# Patient Record
Sex: MALE | Race: WHITE | NOT HISPANIC OR LATINO | Employment: OTHER | URBAN - METROPOLITAN AREA
[De-identification: names, ages, dates, MRNs, and addresses within clinical notes are randomized per-mention and may not be internally consistent; named-entity substitution may affect disease eponyms.]

---

## 2017-02-19 ENCOUNTER — APPOINTMENT (EMERGENCY)
Dept: RADIOLOGY | Facility: HOSPITAL | Age: 82
DRG: 291 | End: 2017-02-19
Payer: MEDICARE

## 2017-02-19 ENCOUNTER — HOSPITAL ENCOUNTER (INPATIENT)
Facility: HOSPITAL | Age: 82
LOS: 2 days | Discharge: HOME | DRG: 291 | End: 2017-02-21
Attending: EMERGENCY MEDICINE | Admitting: FAMILY MEDICINE
Payer: MEDICARE

## 2017-02-19 DIAGNOSIS — I24.8 DEMAND ISCHEMIA (HCC): ICD-10-CM

## 2017-02-19 DIAGNOSIS — I50.23 ACUTE ON CHRONIC SYSTOLIC CONGESTIVE HEART FAILURE (HCC): Primary | ICD-10-CM

## 2017-02-19 DIAGNOSIS — Z79.01 CHRONIC ANTICOAGULATION: ICD-10-CM

## 2017-02-19 DIAGNOSIS — R06.01 ORTHOPNEA: ICD-10-CM

## 2017-02-19 DIAGNOSIS — R06.00 DYSPNEA ON EXERTION: ICD-10-CM

## 2017-02-19 DIAGNOSIS — N18.9 CKD (CHRONIC KIDNEY DISEASE), UNSPECIFIED STAGE: ICD-10-CM

## 2017-02-19 DIAGNOSIS — I50.1 PULMONARY EDEMA WITH CONGESTIVE HEART FAILURE (HCC): ICD-10-CM

## 2017-02-19 PROBLEM — I50.9 ACUTE EXACERBATION OF CHF (CONGESTIVE HEART FAILURE) (HCC): Status: ACTIVE | Noted: 2017-02-19

## 2017-02-19 PROBLEM — I10 HTN (HYPERTENSION): Chronic | Status: ACTIVE | Noted: 2017-02-19

## 2017-02-19 PROBLEM — I48.91 A-FIB (HCC): Chronic | Status: ACTIVE | Noted: 2017-02-19

## 2017-02-19 PROBLEM — Z98.62 H/O CAROTID ANGIOPLASTY: Chronic | Status: ACTIVE | Noted: 2017-02-19

## 2017-02-19 PROBLEM — N17.9 AKI (ACUTE KIDNEY INJURY) (HCC): Status: ACTIVE | Noted: 2017-02-19

## 2017-02-19 PROBLEM — R77.8 ELEVATED TROPONIN: Chronic | Status: ACTIVE | Noted: 2017-02-19

## 2017-02-19 LAB
ANION GAP SERPL CALCULATED.3IONS-SCNC: 10 MMOL/L (ref 4–13)
BASOPHILS # BLD AUTO: 0.03 THOUSANDS/ΜL (ref 0–0.1)
BASOPHILS NFR BLD AUTO: 1 % (ref 0–1)
BUN SERPL-MCNC: 27 MG/DL (ref 5–25)
CALCIUM SERPL-MCNC: 8.7 MG/DL (ref 8.3–10.1)
CHLORIDE SERPL-SCNC: 98 MMOL/L (ref 100–108)
CO2 SERPL-SCNC: 25 MMOL/L (ref 21–32)
CREAT SERPL-MCNC: 2.35 MG/DL (ref 0.6–1.3)
EOSINOPHIL # BLD AUTO: 0.13 THOUSAND/ΜL (ref 0–0.61)
EOSINOPHIL NFR BLD AUTO: 3 % (ref 0–6)
ERYTHROCYTE [DISTWIDTH] IN BLOOD BY AUTOMATED COUNT: 15.9 % (ref 11.6–15.1)
GFR SERPL CREATININE-BSD FRML MDRD: 26.4 ML/MIN/1.73SQ M
GLUCOSE SERPL-MCNC: 133 MG/DL (ref 65–140)
HCT VFR BLD AUTO: 25.5 % (ref 36.5–49.3)
HGB BLD-MCNC: 8.1 G/DL (ref 12–17)
HIV 1+2 AB+HIV1 P24 AG SERPL QL IA: NORMAL
HIV1 P24 AG SER QL: NORMAL
INR PPP: 2.58 (ref 0.86–1.16)
LYMPHOCYTES # BLD AUTO: 1.05 THOUSANDS/ΜL (ref 0.6–4.47)
LYMPHOCYTES NFR BLD AUTO: 21 % (ref 14–44)
MCH RBC QN AUTO: 25.7 PG (ref 26.8–34.3)
MCHC RBC AUTO-ENTMCNC: 31.8 G/DL (ref 31.4–37.4)
MCV RBC AUTO: 81 FL (ref 82–98)
MONOCYTES # BLD AUTO: 0.61 THOUSAND/ΜL (ref 0.17–1.22)
MONOCYTES NFR BLD AUTO: 12 % (ref 4–12)
NEUTROPHILS # BLD AUTO: 3.1 THOUSANDS/ΜL (ref 1.85–7.62)
NEUTS SEG NFR BLD AUTO: 63 % (ref 43–75)
NRBC BLD AUTO-RTO: 0 /100 WBCS
NT-PROBNP SERPL-MCNC: ABNORMAL PG/ML
NT-PROBNP SERPL-MCNC: ABNORMAL PG/ML
PLATELET # BLD AUTO: 151 THOUSANDS/UL (ref 149–390)
PMV BLD AUTO: 11.3 FL (ref 8.9–12.7)
POTASSIUM SERPL-SCNC: 4.4 MMOL/L (ref 3.5–5.3)
PROTHROMBIN TIME: 26.9 SECONDS (ref 12–14.3)
RBC # BLD AUTO: 3.15 MILLION/UL (ref 3.88–5.62)
SODIUM SERPL-SCNC: 133 MMOL/L (ref 136–145)
TROPONIN I SERPL-MCNC: 0.26 NG/ML
TROPONIN I SERPL-MCNC: 0.29 NG/ML
TROPONIN I SERPL-MCNC: 0.3 NG/ML
WBC # BLD AUTO: 4.93 THOUSAND/UL (ref 4.31–10.16)

## 2017-02-19 PROCEDURE — 85610 PROTHROMBIN TIME: CPT | Performed by: EMERGENCY MEDICINE

## 2017-02-19 PROCEDURE — 93005 ELECTROCARDIOGRAM TRACING: CPT | Performed by: EMERGENCY MEDICINE

## 2017-02-19 PROCEDURE — 36415 COLL VENOUS BLD VENIPUNCTURE: CPT | Performed by: EMERGENCY MEDICINE

## 2017-02-19 PROCEDURE — 83880 ASSAY OF NATRIURETIC PEPTIDE: CPT | Performed by: EMERGENCY MEDICINE

## 2017-02-19 PROCEDURE — 87340 HEPATITIS B SURFACE AG IA: CPT | Performed by: PHYSICIAN ASSISTANT

## 2017-02-19 PROCEDURE — 71020 HB CHEST X-RAY 2VW FRONTAL&LATL: CPT

## 2017-02-19 PROCEDURE — 99285 EMERGENCY DEPT VISIT HI MDM: CPT

## 2017-02-19 PROCEDURE — 84484 ASSAY OF TROPONIN QUANT: CPT | Performed by: FAMILY MEDICINE

## 2017-02-19 PROCEDURE — 83880 ASSAY OF NATRIURETIC PEPTIDE: CPT | Performed by: FAMILY MEDICINE

## 2017-02-19 PROCEDURE — 85025 COMPLETE CBC W/AUTO DIFF WBC: CPT | Performed by: EMERGENCY MEDICINE

## 2017-02-19 PROCEDURE — 84484 ASSAY OF TROPONIN QUANT: CPT | Performed by: EMERGENCY MEDICINE

## 2017-02-19 PROCEDURE — 87806 HIV AG W/HIV1&2 ANTB W/OPTIC: CPT | Performed by: PHYSICIAN ASSISTANT

## 2017-02-19 PROCEDURE — 80048 BASIC METABOLIC PNL TOTAL CA: CPT | Performed by: EMERGENCY MEDICINE

## 2017-02-19 PROCEDURE — 86803 HEPATITIS C AB TEST: CPT | Performed by: PHYSICIAN ASSISTANT

## 2017-02-19 RX ORDER — HYDRALAZINE HYDROCHLORIDE 10 MG/1
10 TABLET, FILM COATED ORAL 3 TIMES DAILY
Status: DISCONTINUED | OUTPATIENT
Start: 2017-02-19 | End: 2017-02-21 | Stop reason: HOSPADM

## 2017-02-19 RX ORDER — CHOLECALCIFEROL (VITAMIN D3) 125 MCG
5 CAPSULE ORAL
Status: DISCONTINUED | OUTPATIENT
Start: 2017-02-19 | End: 2017-02-19

## 2017-02-19 RX ORDER — LORAZEPAM 0.5 MG/1
0.5 TABLET ORAL EVERY 6 HOURS PRN
Status: ON HOLD | COMMUNITY
End: 2017-02-19

## 2017-02-19 RX ORDER — CARVEDILOL 6.25 MG/1
6.25 TABLET ORAL 2 TIMES DAILY WITH MEALS
Status: DISCONTINUED | OUTPATIENT
Start: 2017-02-19 | End: 2017-02-21 | Stop reason: HOSPADM

## 2017-02-19 RX ORDER — ALBUTEROL SULFATE 90 UG/1
2 AEROSOL, METERED RESPIRATORY (INHALATION) EVERY 6 HOURS PRN
Status: ON HOLD | COMMUNITY
End: 2018-08-31 | Stop reason: ALTCHOICE

## 2017-02-19 RX ORDER — FUROSEMIDE 10 MG/ML
40 INJECTION INTRAMUSCULAR; INTRAVENOUS DAILY
Status: DISCONTINUED | OUTPATIENT
Start: 2017-02-20 | End: 2017-02-20

## 2017-02-19 RX ORDER — ISOSORBIDE DINITRATE 20 MG/1
20 TABLET ORAL 2 TIMES DAILY
COMMUNITY
End: 2018-02-28 | Stop reason: ALTCHOICE

## 2017-02-19 RX ORDER — CLOPIDOGREL BISULFATE 75 MG/1
75 TABLET ORAL DAILY
Status: ON HOLD | COMMUNITY
End: 2017-02-19

## 2017-02-19 RX ORDER — WARFARIN SODIUM 5 MG/1
5 TABLET ORAL
COMMUNITY
End: 2017-03-22 | Stop reason: DRUGHIGH

## 2017-02-19 RX ORDER — FUROSEMIDE 20 MG/1
20 TABLET ORAL DAILY
Status: ON HOLD | COMMUNITY
End: 2017-02-19

## 2017-02-19 RX ORDER — ALBUTEROL SULFATE 90 UG/1
2 AEROSOL, METERED RESPIRATORY (INHALATION) EVERY 6 HOURS PRN
Status: DISCONTINUED | OUTPATIENT
Start: 2017-02-19 | End: 2017-02-21 | Stop reason: HOSPADM

## 2017-02-19 RX ORDER — WARFARIN SODIUM 5 MG/1
5 TABLET ORAL
Status: DISCONTINUED | OUTPATIENT
Start: 2017-02-19 | End: 2017-02-21 | Stop reason: HOSPADM

## 2017-02-19 RX ORDER — CARVEDILOL 6.25 MG/1
6.25 TABLET ORAL 2 TIMES DAILY WITH MEALS
COMMUNITY
End: 2019-02-21 | Stop reason: HOSPADM

## 2017-02-19 RX ORDER — FUROSEMIDE 10 MG/ML
40 INJECTION INTRAMUSCULAR; INTRAVENOUS ONCE
Status: COMPLETED | OUTPATIENT
Start: 2017-02-19 | End: 2017-02-19

## 2017-02-19 RX ORDER — ASPIRIN 81 MG/1
324 TABLET, CHEWABLE ORAL ONCE
Status: COMPLETED | OUTPATIENT
Start: 2017-02-19 | End: 2017-02-19

## 2017-02-19 RX ORDER — ISOSORBIDE DINITRATE 10 MG/1
20 TABLET ORAL 2 TIMES DAILY
Status: DISCONTINUED | OUTPATIENT
Start: 2017-02-19 | End: 2017-02-21 | Stop reason: HOSPADM

## 2017-02-19 RX ORDER — CHOLECALCIFEROL (VITAMIN D3) 125 MCG
CAPSULE ORAL
COMMUNITY
End: 2017-07-13

## 2017-02-19 RX ORDER — LANOLIN ALCOHOL/MO/W.PET/CERES
3 CREAM (GRAM) TOPICAL
Status: DISCONTINUED | OUTPATIENT
Start: 2017-02-19 | End: 2017-02-21 | Stop reason: HOSPADM

## 2017-02-19 RX ORDER — HYDRALAZINE HYDROCHLORIDE 10 MG/1
10 TABLET, FILM COATED ORAL 3 TIMES DAILY
COMMUNITY
End: 2017-07-17 | Stop reason: HOSPADM

## 2017-02-19 RX ORDER — CALCIUM CARBONATE 200(500)MG
500 TABLET,CHEWABLE ORAL 3 TIMES DAILY PRN
Status: DISCONTINUED | OUTPATIENT
Start: 2017-02-19 | End: 2017-02-21 | Stop reason: HOSPADM

## 2017-02-19 RX ADMIN — HYDRALAZINE HYDROCHLORIDE 10 MG: 10 TABLET, FILM COATED ORAL at 21:43

## 2017-02-19 RX ADMIN — FUROSEMIDE 40 MG: 10 INJECTION, SOLUTION INTRAMUSCULAR; INTRAVENOUS at 11:06

## 2017-02-19 RX ADMIN — MELATONIN TAB 3 MG 3 MG: 3 TAB at 21:30

## 2017-02-19 RX ADMIN — ISOSORBIDE DINITRATE 20 MG: 10 TABLET ORAL at 18:19

## 2017-02-19 RX ADMIN — ASPIRIN 81 MG 324 MG: 81 TABLET ORAL at 11:05

## 2017-02-19 RX ADMIN — CARVEDILOL 6.25 MG: 6.25 TABLET, FILM COATED ORAL at 18:19

## 2017-02-19 RX ADMIN — WARFARIN SODIUM 5 MG: 5 TABLET ORAL at 18:18

## 2017-02-20 ENCOUNTER — APPOINTMENT (INPATIENT)
Dept: NON INVASIVE DIAGNOSTICS | Facility: HOSPITAL | Age: 82
DRG: 291 | End: 2017-02-20
Payer: MEDICARE

## 2017-02-20 LAB
ANION GAP SERPL CALCULATED.3IONS-SCNC: 10 MMOL/L (ref 4–13)
ATRIAL RATE: 72 BPM
BUN SERPL-MCNC: 29 MG/DL (ref 5–25)
CALCIUM SERPL-MCNC: 8.3 MG/DL (ref 8.3–10.1)
CHLORIDE SERPL-SCNC: 98 MMOL/L (ref 100–108)
CO2 SERPL-SCNC: 25 MMOL/L (ref 21–32)
CREAT SERPL-MCNC: 2.49 MG/DL (ref 0.6–1.3)
ERYTHROCYTE [DISTWIDTH] IN BLOOD BY AUTOMATED COUNT: 15.9 % (ref 11.6–15.1)
GFR SERPL CREATININE-BSD FRML MDRD: 24.7 ML/MIN/1.73SQ M
GLUCOSE SERPL-MCNC: 96 MG/DL (ref 65–140)
HCT VFR BLD AUTO: 30.1 % (ref 36.5–49.3)
HGB BLD-MCNC: 9.4 G/DL (ref 12–17)
MAGNESIUM SERPL-MCNC: 1.6 MG/DL (ref 1.6–2.6)
MCH RBC QN AUTO: 25.1 PG (ref 26.8–34.3)
MCHC RBC AUTO-ENTMCNC: 31.2 G/DL (ref 31.4–37.4)
MCV RBC AUTO: 80 FL (ref 82–98)
P AXIS: 73 DEGREES
PLATELET # BLD AUTO: 167 THOUSANDS/UL (ref 149–390)
PMV BLD AUTO: 11.9 FL (ref 8.9–12.7)
POTASSIUM SERPL-SCNC: 4 MMOL/L (ref 3.5–5.3)
PR INTERVAL: 236 MS
QRS AXIS: 96 DEGREES
QRSD INTERVAL: 144 MS
QT INTERVAL: 462 MS
QTC INTERVAL: 505 MS
RBC # BLD AUTO: 3.75 MILLION/UL (ref 3.88–5.62)
SODIUM SERPL-SCNC: 133 MMOL/L (ref 136–145)
T WAVE AXIS: 9 DEGREES
TROPONIN I SERPL-MCNC: 0.27 NG/ML
TSH SERPL DL<=0.05 MIU/L-ACNC: 1.41 UIU/ML (ref 0.36–3.74)
VENTRICULAR RATE: 72 BPM
WBC # BLD AUTO: 6.65 THOUSAND/UL (ref 4.31–10.16)

## 2017-02-20 PROCEDURE — 97110 THERAPEUTIC EXERCISES: CPT

## 2017-02-20 PROCEDURE — 93306 TTE W/DOPPLER COMPLETE: CPT

## 2017-02-20 PROCEDURE — 80048 BASIC METABOLIC PNL TOTAL CA: CPT | Performed by: FAMILY MEDICINE

## 2017-02-20 PROCEDURE — 84484 ASSAY OF TROPONIN QUANT: CPT | Performed by: FAMILY MEDICINE

## 2017-02-20 PROCEDURE — 97162 PT EVAL MOD COMPLEX 30 MIN: CPT

## 2017-02-20 PROCEDURE — G8978 MOBILITY CURRENT STATUS: HCPCS

## 2017-02-20 PROCEDURE — 83735 ASSAY OF MAGNESIUM: CPT | Performed by: FAMILY MEDICINE

## 2017-02-20 PROCEDURE — G8979 MOBILITY GOAL STATUS: HCPCS

## 2017-02-20 PROCEDURE — 84443 ASSAY THYROID STIM HORMONE: CPT | Performed by: FAMILY MEDICINE

## 2017-02-20 PROCEDURE — 85027 COMPLETE CBC AUTOMATED: CPT | Performed by: FAMILY MEDICINE

## 2017-02-20 RX ORDER — FUROSEMIDE 10 MG/ML
40 INJECTION INTRAMUSCULAR; INTRAVENOUS
Status: DISCONTINUED | OUTPATIENT
Start: 2017-02-20 | End: 2017-02-21

## 2017-02-20 RX ORDER — ASPIRIN 325 MG
325 TABLET ORAL DAILY
Status: DISCONTINUED | OUTPATIENT
Start: 2017-02-20 | End: 2017-02-21 | Stop reason: HOSPADM

## 2017-02-20 RX ORDER — ALPRAZOLAM 0.5 MG/1
0.5 TABLET ORAL
Status: DISCONTINUED | OUTPATIENT
Start: 2017-02-20 | End: 2017-02-21 | Stop reason: HOSPADM

## 2017-02-20 RX ADMIN — ALPRAZOLAM 0.5 MG: 0.5 TABLET ORAL at 21:26

## 2017-02-20 RX ADMIN — CARVEDILOL 6.25 MG: 6.25 TABLET, FILM COATED ORAL at 15:42

## 2017-02-20 RX ADMIN — FUROSEMIDE 40 MG: 10 INJECTION, SOLUTION INTRAMUSCULAR; INTRAVENOUS at 15:42

## 2017-02-20 RX ADMIN — HYDRALAZINE HYDROCHLORIDE 10 MG: 10 TABLET, FILM COATED ORAL at 21:26

## 2017-02-20 RX ADMIN — WARFARIN SODIUM 5 MG: 5 TABLET ORAL at 19:20

## 2017-02-20 RX ADMIN — HYDRALAZINE HYDROCHLORIDE 10 MG: 10 TABLET, FILM COATED ORAL at 09:58

## 2017-02-20 RX ADMIN — MELATONIN TAB 3 MG 3 MG: 3 TAB at 21:26

## 2017-02-20 RX ADMIN — CARVEDILOL 6.25 MG: 6.25 TABLET, FILM COATED ORAL at 08:59

## 2017-02-20 RX ADMIN — ISOSORBIDE DINITRATE 20 MG: 10 TABLET ORAL at 08:59

## 2017-02-20 RX ADMIN — HYDRALAZINE HYDROCHLORIDE 10 MG: 10 TABLET, FILM COATED ORAL at 15:42

## 2017-02-20 RX ADMIN — FUROSEMIDE 40 MG: 10 INJECTION, SOLUTION INTRAMUSCULAR; INTRAVENOUS at 08:59

## 2017-02-20 RX ADMIN — ISOSORBIDE DINITRATE 10 MG: 10 TABLET ORAL at 19:19

## 2017-02-21 VITALS
BODY MASS INDEX: 27.23 KG/M2 | OXYGEN SATURATION: 94 % | HEIGHT: 68 IN | SYSTOLIC BLOOD PRESSURE: 139 MMHG | DIASTOLIC BLOOD PRESSURE: 64 MMHG | WEIGHT: 179.68 LBS | RESPIRATION RATE: 18 BRPM | HEART RATE: 67 BPM | TEMPERATURE: 98.1 F

## 2017-02-21 PROBLEM — I50.9 ACUTE EXACERBATION OF CHF (CONGESTIVE HEART FAILURE) (HCC): Status: RESOLVED | Noted: 2017-02-19 | Resolved: 2017-02-21

## 2017-02-21 PROBLEM — Z86.79 HISTORY OF CHF (CONGESTIVE HEART FAILURE): Status: ACTIVE | Noted: 2017-02-21

## 2017-02-21 LAB
HBV SURFACE AG SER QL: NORMAL
HCV AB SER QL: ABNORMAL

## 2017-02-21 RX ORDER — FUROSEMIDE 40 MG/1
40 TABLET ORAL DAILY
Status: DISCONTINUED | OUTPATIENT
Start: 2017-02-22 | End: 2017-02-21 | Stop reason: HOSPADM

## 2017-02-21 RX ORDER — ALPRAZOLAM 0.5 MG/1
0.25 TABLET ORAL
Qty: 30 TABLET | Refills: 0 | Status: SHIPPED | OUTPATIENT
Start: 2017-02-21 | End: 2018-07-11

## 2017-02-21 RX ORDER — FUROSEMIDE 40 MG/1
40 TABLET ORAL DAILY
Qty: 30 TABLET | Refills: 0 | Status: SHIPPED | OUTPATIENT
Start: 2017-02-22 | End: 2017-07-17 | Stop reason: HOSPADM

## 2017-02-21 RX ADMIN — ISOSORBIDE DINITRATE 20 MG: 10 TABLET ORAL at 08:31

## 2017-02-21 RX ADMIN — HYDRALAZINE HYDROCHLORIDE 10 MG: 10 TABLET, FILM COATED ORAL at 08:32

## 2017-02-21 RX ADMIN — FUROSEMIDE 40 MG: 10 INJECTION, SOLUTION INTRAMUSCULAR; INTRAVENOUS at 08:32

## 2017-02-21 RX ADMIN — ASPIRIN 325 MG: 325 TABLET ORAL at 08:31

## 2017-02-21 RX ADMIN — CARVEDILOL 6.25 MG: 6.25 TABLET, FILM COATED ORAL at 08:31

## 2017-03-13 ENCOUNTER — GENERIC CONVERSION - ENCOUNTER (OUTPATIENT)
Dept: OTHER | Facility: OTHER | Age: 82
End: 2017-03-13

## 2017-03-22 ENCOUNTER — APPOINTMENT (EMERGENCY)
Dept: RADIOLOGY | Facility: HOSPITAL | Age: 82
DRG: 378 | End: 2017-03-22
Payer: MEDICARE

## 2017-03-22 ENCOUNTER — APPOINTMENT (EMERGENCY)
Dept: CT IMAGING | Facility: HOSPITAL | Age: 82
DRG: 378 | End: 2017-03-22
Payer: MEDICARE

## 2017-03-22 ENCOUNTER — HOSPITAL ENCOUNTER (INPATIENT)
Facility: HOSPITAL | Age: 82
LOS: 4 days | Discharge: HOME HEALTH | DRG: 378 | End: 2017-03-26
Attending: EMERGENCY MEDICINE | Admitting: INTERNAL MEDICINE
Payer: MEDICARE

## 2017-03-22 ENCOUNTER — APPOINTMENT (INPATIENT)
Dept: ULTRASOUND IMAGING | Facility: HOSPITAL | Age: 82
DRG: 378 | End: 2017-03-22
Payer: MEDICARE

## 2017-03-22 DIAGNOSIS — K92.2 GASTROINTESTINAL BLEEDING: Primary | ICD-10-CM

## 2017-03-22 DIAGNOSIS — N18.4 CKD (CHRONIC KIDNEY DISEASE) STAGE 4, GFR 15-29 ML/MIN (HCC): ICD-10-CM

## 2017-03-22 DIAGNOSIS — D68.9 COAGULOPATHY (HCC): ICD-10-CM

## 2017-03-22 DIAGNOSIS — K92.1 MELENA: ICD-10-CM

## 2017-03-22 DIAGNOSIS — N17.9 AKI (ACUTE KIDNEY INJURY) (HCC): ICD-10-CM

## 2017-03-22 DIAGNOSIS — D64.9 ANEMIA, UNSPECIFIED TYPE: ICD-10-CM

## 2017-03-22 PROBLEM — R42 DIZZINESS: Status: ACTIVE | Noted: 2017-03-22

## 2017-03-22 PROBLEM — I48.91 ATRIAL FIBRILLATION (HCC): Status: ACTIVE | Noted: 2017-03-22

## 2017-03-22 PROBLEM — R06.02 SOB (SHORTNESS OF BREATH): Status: ACTIVE | Noted: 2017-03-22

## 2017-03-22 PROBLEM — I50.9 CHF (CONGESTIVE HEART FAILURE) (HCC): Status: ACTIVE | Noted: 2017-03-22

## 2017-03-22 LAB
ABO GROUP BLD: NORMAL
ALBUMIN SERPL BCP-MCNC: 3.5 G/DL (ref 3.5–5)
ALP SERPL-CCNC: 82 U/L (ref 46–116)
ALT SERPL W P-5'-P-CCNC: 28 U/L (ref 12–78)
ANION GAP SERPL CALCULATED.3IONS-SCNC: 10 MMOL/L (ref 4–13)
AST SERPL W P-5'-P-CCNC: 26 U/L (ref 5–45)
ATRIAL RATE: 73 BPM
BASOPHILS # BLD AUTO: 0.03 THOUSANDS/ΜL (ref 0–0.1)
BASOPHILS NFR BLD AUTO: 1 % (ref 0–1)
BILIRUB DIRECT SERPL-MCNC: 0.24 MG/DL (ref 0–0.2)
BILIRUB SERPL-MCNC: 0.6 MG/DL (ref 0.2–1)
BLD GP AB SCN SERPL QL: NEGATIVE
BUN SERPL-MCNC: 45 MG/DL (ref 5–25)
CALCIUM SERPL-MCNC: 9 MG/DL (ref 8.3–10.1)
CHLORIDE SERPL-SCNC: 108 MMOL/L (ref 100–108)
CO2 SERPL-SCNC: 27 MMOL/L (ref 21–32)
CREAT SERPL-MCNC: 2.52 MG/DL (ref 0.6–1.3)
EOSINOPHIL # BLD AUTO: 0.3 THOUSAND/ΜL (ref 0–0.61)
EOSINOPHIL NFR BLD AUTO: 5 % (ref 0–6)
ERYTHROCYTE [DISTWIDTH] IN BLOOD BY AUTOMATED COUNT: 18 % (ref 11.6–15.1)
FERRITIN SERPL-MCNC: 31 NG/ML (ref 8–388)
GFR SERPL CREATININE-BSD FRML MDRD: 24.4 ML/MIN/1.73SQ M
GLUCOSE SERPL-MCNC: 126 MG/DL (ref 65–140)
HCT VFR BLD AUTO: 28.6 % (ref 36.5–49.3)
HCT VFR BLD AUTO: 29.3 % (ref 36.5–49.3)
HGB BLD-MCNC: 8.7 G/DL (ref 12–17)
HGB BLD-MCNC: 9 G/DL (ref 12–17)
INR PPP: 2.52 (ref 0.86–1.16)
IRON SATN MFR SERPL: 20 %
IRON SERPL-MCNC: 74 UG/DL (ref 65–175)
LYMPHOCYTES # BLD AUTO: 1.21 THOUSANDS/ΜL (ref 0.6–4.47)
LYMPHOCYTES NFR BLD AUTO: 22 % (ref 14–44)
MCH RBC QN AUTO: 24.6 PG (ref 26.8–34.3)
MCHC RBC AUTO-ENTMCNC: 30.4 G/DL (ref 31.4–37.4)
MCV RBC AUTO: 81 FL (ref 82–98)
MONOCYTES # BLD AUTO: 0.59 THOUSAND/ΜL (ref 0.17–1.22)
MONOCYTES NFR BLD AUTO: 11 % (ref 4–12)
NEUTROPHILS # BLD AUTO: 3.42 THOUSANDS/ΜL (ref 1.85–7.62)
NEUTS SEG NFR BLD AUTO: 61 % (ref 43–75)
NRBC BLD AUTO-RTO: 0 /100 WBCS
P AXIS: 54 DEGREES
PLATELET # BLD AUTO: 110 THOUSANDS/UL (ref 149–390)
PMV BLD AUTO: 11.7 FL (ref 8.9–12.7)
POTASSIUM SERPL-SCNC: 4 MMOL/L (ref 3.5–5.3)
PR INTERVAL: 224 MS
PROT SERPL-MCNC: 8.2 G/DL (ref 6.4–8.2)
PROTHROMBIN TIME: 26.4 SECONDS (ref 12–14.3)
QRS AXIS: 100 DEGREES
QRSD INTERVAL: 148 MS
QT INTERVAL: 454 MS
QTC INTERVAL: 500 MS
RBC # BLD AUTO: 3.54 MILLION/UL (ref 3.88–5.62)
RH BLD: NEGATIVE
SODIUM SERPL-SCNC: 145 MMOL/L (ref 136–145)
T WAVE AXIS: -9 DEGREES
TIBC SERPL-MCNC: 373 UG/DL (ref 250–450)
TROPONIN I SERPL-MCNC: 0.05 NG/ML
VENTRICULAR RATE: 73 BPM
WBC # BLD AUTO: 5.57 THOUSAND/UL (ref 4.31–10.16)

## 2017-03-22 PROCEDURE — 36415 COLL VENOUS BLD VENIPUNCTURE: CPT | Performed by: EMERGENCY MEDICINE

## 2017-03-22 PROCEDURE — P9017 PLASMA 1 DONOR FRZ W/IN 8 HR: HCPCS

## 2017-03-22 PROCEDURE — 86901 BLOOD TYPING SEROLOGIC RH(D): CPT | Performed by: EMERGENCY MEDICINE

## 2017-03-22 PROCEDURE — 71020 HB CHEST X-RAY 2VW FRONTAL&LATL: CPT

## 2017-03-22 PROCEDURE — P9021 RED BLOOD CELLS UNIT: HCPCS

## 2017-03-22 PROCEDURE — 82728 ASSAY OF FERRITIN: CPT | Performed by: INTERNAL MEDICINE

## 2017-03-22 PROCEDURE — 83540 ASSAY OF IRON: CPT | Performed by: INTERNAL MEDICINE

## 2017-03-22 PROCEDURE — 85610 PROTHROMBIN TIME: CPT | Performed by: EMERGENCY MEDICINE

## 2017-03-22 PROCEDURE — 86850 RBC ANTIBODY SCREEN: CPT | Performed by: EMERGENCY MEDICINE

## 2017-03-22 PROCEDURE — 80076 HEPATIC FUNCTION PANEL: CPT | Performed by: EMERGENCY MEDICINE

## 2017-03-22 PROCEDURE — 85018 HEMOGLOBIN: CPT | Performed by: FAMILY MEDICINE

## 2017-03-22 PROCEDURE — 99285 EMERGENCY DEPT VISIT HI MDM: CPT

## 2017-03-22 PROCEDURE — 86900 BLOOD TYPING SEROLOGIC ABO: CPT | Performed by: EMERGENCY MEDICINE

## 2017-03-22 PROCEDURE — 74176 CT ABD & PELVIS W/O CONTRAST: CPT

## 2017-03-22 PROCEDURE — 86927 PLASMA FRESH FROZEN: CPT

## 2017-03-22 PROCEDURE — 85025 COMPLETE CBC W/AUTO DIFF WBC: CPT | Performed by: EMERGENCY MEDICINE

## 2017-03-22 PROCEDURE — 76770 US EXAM ABDO BACK WALL COMP: CPT

## 2017-03-22 PROCEDURE — 80048 BASIC METABOLIC PNL TOTAL CA: CPT | Performed by: EMERGENCY MEDICINE

## 2017-03-22 PROCEDURE — C9113 INJ PANTOPRAZOLE SODIUM, VIA: HCPCS | Performed by: FAMILY MEDICINE

## 2017-03-22 PROCEDURE — 84484 ASSAY OF TROPONIN QUANT: CPT | Performed by: FAMILY MEDICINE

## 2017-03-22 PROCEDURE — 83550 IRON BINDING TEST: CPT | Performed by: INTERNAL MEDICINE

## 2017-03-22 PROCEDURE — 93005 ELECTROCARDIOGRAM TRACING: CPT | Performed by: EMERGENCY MEDICINE

## 2017-03-22 PROCEDURE — 86920 COMPATIBILITY TEST SPIN: CPT

## 2017-03-22 PROCEDURE — 85014 HEMATOCRIT: CPT | Performed by: FAMILY MEDICINE

## 2017-03-22 PROCEDURE — 30233N1 TRANSFUSION OF NONAUTOLOGOUS RED BLOOD CELLS INTO PERIPHERAL VEIN, PERCUTANEOUS APPROACH: ICD-10-PCS | Performed by: INTERNAL MEDICINE

## 2017-03-22 RX ORDER — ACETAMINOPHEN 325 MG/1
650 TABLET ORAL EVERY 6 HOURS PRN
Status: DISCONTINUED | OUTPATIENT
Start: 2017-03-22 | End: 2017-03-26 | Stop reason: HOSPADM

## 2017-03-22 RX ORDER — FUROSEMIDE 40 MG/1
40 TABLET ORAL DAILY
Status: DISCONTINUED | OUTPATIENT
Start: 2017-03-22 | End: 2017-03-26 | Stop reason: HOSPADM

## 2017-03-22 RX ORDER — ALPRAZOLAM 0.25 MG/1
0.25 TABLET ORAL 3 TIMES DAILY PRN
Status: DISCONTINUED | OUTPATIENT
Start: 2017-03-22 | End: 2017-03-26 | Stop reason: HOSPADM

## 2017-03-22 RX ORDER — WARFARIN SODIUM 1 MG/1
1 TABLET ORAL DAILY
COMMUNITY
End: 2017-03-26 | Stop reason: HOSPADM

## 2017-03-22 RX ORDER — HYDRALAZINE HYDROCHLORIDE 10 MG/1
10 TABLET, FILM COATED ORAL 3 TIMES DAILY
Status: DISCONTINUED | OUTPATIENT
Start: 2017-03-22 | End: 2017-03-25

## 2017-03-22 RX ORDER — CARVEDILOL 6.25 MG/1
6.25 TABLET ORAL 2 TIMES DAILY WITH MEALS
Status: DISCONTINUED | OUTPATIENT
Start: 2017-03-22 | End: 2017-03-23

## 2017-03-22 RX ORDER — ISOSORBIDE DINITRATE 10 MG/1
20 TABLET ORAL 2 TIMES DAILY
Status: DISCONTINUED | OUTPATIENT
Start: 2017-03-22 | End: 2017-03-26 | Stop reason: HOSPADM

## 2017-03-22 RX ORDER — ONDANSETRON 2 MG/ML
4 INJECTION INTRAMUSCULAR; INTRAVENOUS EVERY 6 HOURS PRN
Status: DISCONTINUED | OUTPATIENT
Start: 2017-03-22 | End: 2017-03-26 | Stop reason: HOSPADM

## 2017-03-22 RX ORDER — ALBUTEROL SULFATE 90 UG/1
2 AEROSOL, METERED RESPIRATORY (INHALATION) EVERY 6 HOURS PRN
Status: DISCONTINUED | OUTPATIENT
Start: 2017-03-22 | End: 2017-03-26 | Stop reason: HOSPADM

## 2017-03-22 RX ORDER — PHYTONADIONE 5 MG/1
2.5 TABLET ORAL ONCE
Status: COMPLETED | OUTPATIENT
Start: 2017-03-22 | End: 2017-03-22

## 2017-03-22 RX ADMIN — HYDRALAZINE HYDROCHLORIDE 10 MG: 10 TABLET, FILM COATED ORAL at 15:23

## 2017-03-22 RX ADMIN — HYDRALAZINE HYDROCHLORIDE 10 MG: 10 TABLET, FILM COATED ORAL at 23:40

## 2017-03-22 RX ADMIN — SODIUM CHLORIDE 80 MG: 9 INJECTION, SOLUTION INTRAVENOUS at 14:34

## 2017-03-22 RX ADMIN — ISOSORBIDE DINITRATE 20 MG: 10 TABLET ORAL at 17:52

## 2017-03-22 RX ADMIN — PHYTONADIONE 2.5 MG: 5 TABLET ORAL at 14:10

## 2017-03-22 RX ADMIN — FUROSEMIDE 40 MG: 40 TABLET ORAL at 17:52

## 2017-03-22 RX ADMIN — ACETAMINOPHEN 650 MG: 325 TABLET ORAL at 16:09

## 2017-03-22 RX ADMIN — CARVEDILOL 6.25 MG: 6.25 TABLET, FILM COATED ORAL at 17:53

## 2017-03-22 RX ADMIN — ALPRAZOLAM 0.25 MG: 0.25 TABLET ORAL at 16:10

## 2017-03-22 RX ADMIN — SODIUM CHLORIDE 8 MG/HR: 9 INJECTION, SOLUTION INTRAVENOUS at 18:22

## 2017-03-23 ENCOUNTER — ANESTHESIA (INPATIENT)
Dept: PERIOP | Facility: HOSPITAL | Age: 82
DRG: 378 | End: 2017-03-23
Payer: MEDICARE

## 2017-03-23 ENCOUNTER — ANESTHESIA EVENT (INPATIENT)
Dept: PERIOP | Facility: HOSPITAL | Age: 82
DRG: 378 | End: 2017-03-23
Payer: MEDICARE

## 2017-03-23 PROBLEM — I12.9 HYPERTENSIVE CKD (CHRONIC KIDNEY DISEASE): Status: ACTIVE | Noted: 2017-03-23

## 2017-03-23 PROBLEM — D64.9 ANEMIA: Status: ACTIVE | Noted: 2017-03-23

## 2017-03-23 LAB
ABO GROUP BLD BPU: NORMAL
ABO GROUP BLD BPU: NORMAL
AMORPH PHOS CRY URNS QL MICRO: ABNORMAL /HPF
ANION GAP SERPL CALCULATED.3IONS-SCNC: 10 MMOL/L (ref 4–13)
BACTERIA UR QL AUTO: ABNORMAL /HPF
BILIRUB UR QL STRIP: NEGATIVE
BPU ID: NORMAL
BPU ID: NORMAL
BUN SERPL-MCNC: 40 MG/DL (ref 5–25)
CALCIUM SERPL-MCNC: 9 MG/DL (ref 8.3–10.1)
CHLORIDE SERPL-SCNC: 106 MMOL/L (ref 100–108)
CLARITY UR: ABNORMAL
CO2 SERPL-SCNC: 27 MMOL/L (ref 21–32)
COLOR UR: YELLOW
CREAT SERPL-MCNC: 2.51 MG/DL (ref 0.6–1.3)
CREAT UR-MCNC: 65.4 MG/DL
CROSSMATCH: NORMAL
FOLATE SERPL-MCNC: 15.2 NG/ML (ref 3.1–17.5)
GFR SERPL CREATININE-BSD FRML MDRD: 24.5 ML/MIN/1.73SQ M
GLUCOSE SERPL-MCNC: 97 MG/DL (ref 65–140)
GLUCOSE UR STRIP-MCNC: NEGATIVE MG/DL
HCT VFR BLD AUTO: 26.7 % (ref 36.5–49.3)
HCT VFR BLD AUTO: 28.5 % (ref 36.5–49.3)
HCT VFR BLD AUTO: 29.7 % (ref 36.5–49.3)
HGB BLD-MCNC: 8.1 G/DL (ref 12–17)
HGB BLD-MCNC: 8.8 G/DL (ref 12–17)
HGB BLD-MCNC: 9.1 G/DL (ref 12–17)
HGB UR QL STRIP.AUTO: ABNORMAL
INR PPP: 1.87 (ref 0.86–1.16)
INR PPP: 2.15 (ref 0.86–1.16)
KETONES UR STRIP-MCNC: NEGATIVE MG/DL
LEUKOCYTE ESTERASE UR QL STRIP: ABNORMAL
MICROALBUMIN UR-MCNC: 313 MG/L (ref 0–20)
MICROALBUMIN/CREAT 24H UR: 479 MG/G CREATININE (ref 0–30)
NITRITE UR QL STRIP: POSITIVE
NON-SQ EPI CELLS URNS QL MICRO: ABNORMAL /HPF
PH UR STRIP.AUTO: 8 [PH] (ref 4.5–8)
POTASSIUM SERPL-SCNC: 4.1 MMOL/L (ref 3.5–5.3)
PROT UR STRIP-MCNC: ABNORMAL MG/DL
PROTHROMBIN TIME: 21 SECONDS (ref 12–14.3)
PROTHROMBIN TIME: 23.4 SECONDS (ref 12–14.3)
RBC #/AREA URNS AUTO: ABNORMAL /HPF
SODIUM 24H UR-SCNC: 82 MOL/L
SODIUM SERPL-SCNC: 143 MMOL/L (ref 136–145)
SP GR UR STRIP.AUTO: 1.01 (ref 1–1.03)
TROPONIN I SERPL-MCNC: 0.05 NG/ML
UNIT DISPENSE STATUS: NORMAL
UNIT DISPENSE STATUS: NORMAL
UNIT PRODUCT CODE: NORMAL
UNIT PRODUCT CODE: NORMAL
UNIT RH: NORMAL
UNIT RH: NORMAL
UROBILINOGEN UR QL STRIP.AUTO: 0.2 E.U./DL
UUN 24H UR-MCNC: 473 MG/DL
VIT B12 SERPL-MCNC: 332 PG/ML (ref 100–900)
WBC #/AREA URNS AUTO: ABNORMAL /HPF

## 2017-03-23 PROCEDURE — 81001 URINALYSIS AUTO W/SCOPE: CPT | Performed by: INTERNAL MEDICINE

## 2017-03-23 PROCEDURE — C9113 INJ PANTOPRAZOLE SODIUM, VIA: HCPCS | Performed by: FAMILY MEDICINE

## 2017-03-23 PROCEDURE — 85610 PROTHROMBIN TIME: CPT | Performed by: FAMILY MEDICINE

## 2017-03-23 PROCEDURE — 82043 UR ALBUMIN QUANTITATIVE: CPT | Performed by: INTERNAL MEDICINE

## 2017-03-23 PROCEDURE — 82746 ASSAY OF FOLIC ACID SERUM: CPT | Performed by: INTERNAL MEDICINE

## 2017-03-23 PROCEDURE — 84484 ASSAY OF TROPONIN QUANT: CPT | Performed by: FAMILY MEDICINE

## 2017-03-23 PROCEDURE — 84540 ASSAY OF URINE/UREA-N: CPT | Performed by: INTERNAL MEDICINE

## 2017-03-23 PROCEDURE — 85610 PROTHROMBIN TIME: CPT | Performed by: PHYSICIAN ASSISTANT

## 2017-03-23 PROCEDURE — 84300 ASSAY OF URINE SODIUM: CPT | Performed by: INTERNAL MEDICINE

## 2017-03-23 PROCEDURE — 85014 HEMATOCRIT: CPT | Performed by: FAMILY MEDICINE

## 2017-03-23 PROCEDURE — 80048 BASIC METABOLIC PNL TOTAL CA: CPT | Performed by: FAMILY MEDICINE

## 2017-03-23 PROCEDURE — 82570 ASSAY OF URINE CREATININE: CPT | Performed by: INTERNAL MEDICINE

## 2017-03-23 PROCEDURE — 85018 HEMOGLOBIN: CPT | Performed by: FAMILY MEDICINE

## 2017-03-23 PROCEDURE — P9017 PLASMA 1 DONOR FRZ W/IN 8 HR: HCPCS

## 2017-03-23 PROCEDURE — 82607 VITAMIN B-12: CPT | Performed by: INTERNAL MEDICINE

## 2017-03-23 PROCEDURE — 86927 PLASMA FRESH FROZEN: CPT

## 2017-03-23 PROCEDURE — 88342 IMHCHEM/IMCYTCHM 1ST ANTB: CPT | Performed by: INTERNAL MEDICINE

## 2017-03-23 PROCEDURE — 88305 TISSUE EXAM BY PATHOLOGIST: CPT | Performed by: INTERNAL MEDICINE

## 2017-03-23 PROCEDURE — 0DB68ZX EXCISION OF STOMACH, VIA NATURAL OR ARTIFICIAL OPENING ENDOSCOPIC, DIAGNOSTIC: ICD-10-PCS | Performed by: INTERNAL MEDICINE

## 2017-03-23 RX ORDER — PROPOFOL 10 MG/ML
INJECTION, EMULSION INTRAVENOUS AS NEEDED
Status: DISCONTINUED | OUTPATIENT
Start: 2017-03-23 | End: 2017-03-23 | Stop reason: SURG

## 2017-03-23 RX ORDER — CARVEDILOL 12.5 MG/1
12.5 TABLET ORAL 2 TIMES DAILY WITH MEALS
Status: DISCONTINUED | OUTPATIENT
Start: 2017-03-23 | End: 2017-03-26 | Stop reason: HOSPADM

## 2017-03-23 RX ORDER — ZOLPIDEM TARTRATE 5 MG/1
5 TABLET ORAL
Status: DISCONTINUED | OUTPATIENT
Start: 2017-03-23 | End: 2017-03-26 | Stop reason: HOSPADM

## 2017-03-23 RX ORDER — SODIUM CHLORIDE, SODIUM LACTATE, POTASSIUM CHLORIDE, CALCIUM CHLORIDE 600; 310; 30; 20 MG/100ML; MG/100ML; MG/100ML; MG/100ML
INJECTION, SOLUTION INTRAVENOUS CONTINUOUS PRN
Status: DISCONTINUED | OUTPATIENT
Start: 2017-03-23 | End: 2017-03-23 | Stop reason: SURG

## 2017-03-23 RX ORDER — LIDOCAINE HYDROCHLORIDE 10 MG/ML
INJECTION, SOLUTION INFILTRATION; PERINEURAL AS NEEDED
Status: DISCONTINUED | OUTPATIENT
Start: 2017-03-23 | End: 2017-03-23 | Stop reason: SURG

## 2017-03-23 RX ORDER — FUROSEMIDE 10 MG/ML
20 INJECTION INTRAMUSCULAR; INTRAVENOUS ONCE
Status: DISCONTINUED | OUTPATIENT
Start: 2017-03-23 | End: 2017-03-26 | Stop reason: HOSPADM

## 2017-03-23 RX ADMIN — SODIUM CHLORIDE 8 MG/HR: 9 INJECTION, SOLUTION INTRAVENOUS at 04:38

## 2017-03-23 RX ADMIN — LIDOCAINE HYDROCHLORIDE 50 MG: 10 INJECTION, SOLUTION INFILTRATION; PERINEURAL at 13:58

## 2017-03-23 RX ADMIN — POLYETHYLENE GLYCOL 3350, SODIUM SULFATE ANHYDROUS, SODIUM BICARBONATE, SODIUM CHLORIDE, POTASSIUM CHLORIDE 4000 ML: 236; 22.74; 6.74; 5.86; 2.97 POWDER, FOR SOLUTION ORAL at 18:29

## 2017-03-23 RX ADMIN — ALPRAZOLAM 0.25 MG: 0.25 TABLET ORAL at 08:52

## 2017-03-23 RX ADMIN — ALPRAZOLAM 0.25 MG: 0.25 TABLET ORAL at 16:11

## 2017-03-23 RX ADMIN — SODIUM CHLORIDE, SODIUM LACTATE, POTASSIUM CHLORIDE, AND CALCIUM CHLORIDE: .6; .31; .03; .02 INJECTION, SOLUTION INTRAVENOUS at 13:53

## 2017-03-23 RX ADMIN — BISACODYL 10 MG: 5 TABLET, COATED ORAL at 16:13

## 2017-03-23 RX ADMIN — ISOSORBIDE DINITRATE 20 MG: 10 TABLET ORAL at 16:11

## 2017-03-23 RX ADMIN — HYDRALAZINE HYDROCHLORIDE 10 MG: 10 TABLET, FILM COATED ORAL at 16:13

## 2017-03-23 RX ADMIN — ISOSORBIDE DINITRATE 20 MG: 10 TABLET ORAL at 08:52

## 2017-03-23 RX ADMIN — PROPOFOL 100 MG: 10 INJECTION, EMULSION INTRAVENOUS at 13:55

## 2017-03-23 RX ADMIN — FUROSEMIDE 40 MG: 40 TABLET ORAL at 08:52

## 2017-03-23 RX ADMIN — CARVEDILOL 12.5 MG: 6.25 TABLET, FILM COATED ORAL at 16:10

## 2017-03-23 RX ADMIN — ALPRAZOLAM 0.25 MG: 0.25 TABLET ORAL at 23:07

## 2017-03-23 RX ADMIN — CARVEDILOL 12.5 MG: 6.25 TABLET, FILM COATED ORAL at 08:52

## 2017-03-23 RX ADMIN — SODIUM CHLORIDE 8 MG/HR: 9 INJECTION, SOLUTION INTRAVENOUS at 18:56

## 2017-03-24 ENCOUNTER — ANESTHESIA EVENT (INPATIENT)
Dept: PERIOP | Facility: HOSPITAL | Age: 82
DRG: 378 | End: 2017-03-24
Payer: MEDICARE

## 2017-03-24 ENCOUNTER — ANESTHESIA (INPATIENT)
Dept: PERIOP | Facility: HOSPITAL | Age: 82
DRG: 378 | End: 2017-03-24
Payer: MEDICARE

## 2017-03-24 LAB
ABO GROUP BLD BPU: NORMAL
ABO GROUP BLD BPU: NORMAL
ANION GAP SERPL CALCULATED.3IONS-SCNC: 11 MMOL/L (ref 4–13)
BPU ID: NORMAL
BPU ID: NORMAL
BUN SERPL-MCNC: 37 MG/DL (ref 5–25)
CALCIUM SERPL-MCNC: 9 MG/DL (ref 8.3–10.1)
CHLORIDE SERPL-SCNC: 106 MMOL/L (ref 100–108)
CO2 SERPL-SCNC: 28 MMOL/L (ref 21–32)
CREAT SERPL-MCNC: 2.72 MG/DL (ref 0.6–1.3)
ERYTHROCYTE [DISTWIDTH] IN BLOOD BY AUTOMATED COUNT: 18.1 % (ref 11.6–15.1)
GFR SERPL CREATININE-BSD FRML MDRD: 22.3 ML/MIN/1.73SQ M
GLUCOSE SERPL-MCNC: 90 MG/DL (ref 65–140)
HCT VFR BLD AUTO: 31.2 % (ref 36.5–49.3)
HGB BLD-MCNC: 9.6 G/DL (ref 12–17)
INR PPP: 1.73 (ref 0.86–1.16)
MCH RBC QN AUTO: 24.6 PG (ref 26.8–34.3)
MCHC RBC AUTO-ENTMCNC: 30.8 G/DL (ref 31.4–37.4)
MCV RBC AUTO: 80 FL (ref 82–98)
PLATELET # BLD AUTO: 95 THOUSANDS/UL (ref 149–390)
PMV BLD AUTO: 12.8 FL (ref 8.9–12.7)
POTASSIUM SERPL-SCNC: 4.2 MMOL/L (ref 3.5–5.3)
PROTHROMBIN TIME: 19.9 SECONDS (ref 12–14.3)
RBC # BLD AUTO: 3.9 MILLION/UL (ref 3.88–5.62)
SODIUM SERPL-SCNC: 145 MMOL/L (ref 136–145)
UNIT DISPENSE STATUS: NORMAL
UNIT DISPENSE STATUS: NORMAL
UNIT PRODUCT CODE: NORMAL
UNIT PRODUCT CODE: NORMAL
UNIT RH: NORMAL
UNIT RH: NORMAL
WBC # BLD AUTO: 5.66 THOUSAND/UL (ref 4.31–10.16)

## 2017-03-24 PROCEDURE — 80048 BASIC METABOLIC PNL TOTAL CA: CPT | Performed by: FAMILY MEDICINE

## 2017-03-24 PROCEDURE — 88305 TISSUE EXAM BY PATHOLOGIST: CPT | Performed by: INTERNAL MEDICINE

## 2017-03-24 PROCEDURE — C9113 INJ PANTOPRAZOLE SODIUM, VIA: HCPCS | Performed by: FAMILY MEDICINE

## 2017-03-24 PROCEDURE — 0D5K8ZZ DESTRUCTION OF ASCENDING COLON, VIA NATURAL OR ARTIFICIAL OPENING ENDOSCOPIC: ICD-10-PCS | Performed by: INTERNAL MEDICINE

## 2017-03-24 PROCEDURE — 85027 COMPLETE CBC AUTOMATED: CPT | Performed by: FAMILY MEDICINE

## 2017-03-24 PROCEDURE — 85610 PROTHROMBIN TIME: CPT | Performed by: FAMILY MEDICINE

## 2017-03-24 PROCEDURE — 0DBN8ZX EXCISION OF SIGMOID COLON, VIA NATURAL OR ARTIFICIAL OPENING ENDOSCOPIC, DIAGNOSTIC: ICD-10-PCS | Performed by: INTERNAL MEDICINE

## 2017-03-24 PROCEDURE — 0DBL8ZX EXCISION OF TRANSVERSE COLON, VIA NATURAL OR ARTIFICIAL OPENING ENDOSCOPIC, DIAGNOSTIC: ICD-10-PCS | Performed by: INTERNAL MEDICINE

## 2017-03-24 RX ORDER — EPHEDRINE SULFATE 50 MG/ML
INJECTION, SOLUTION INTRAVENOUS AS NEEDED
Status: DISCONTINUED | OUTPATIENT
Start: 2017-03-24 | End: 2017-03-24 | Stop reason: SURG

## 2017-03-24 RX ORDER — PROPOFOL 10 MG/ML
INJECTION, EMULSION INTRAVENOUS AS NEEDED
Status: DISCONTINUED | OUTPATIENT
Start: 2017-03-24 | End: 2017-03-24 | Stop reason: SURG

## 2017-03-24 RX ORDER — SODIUM CHLORIDE, SODIUM LACTATE, POTASSIUM CHLORIDE, CALCIUM CHLORIDE 600; 310; 30; 20 MG/100ML; MG/100ML; MG/100ML; MG/100ML
INJECTION, SOLUTION INTRAVENOUS CONTINUOUS PRN
Status: DISCONTINUED | OUTPATIENT
Start: 2017-03-24 | End: 2017-03-24 | Stop reason: SURG

## 2017-03-24 RX ADMIN — PROPOFOL 10 MG: 10 INJECTION, EMULSION INTRAVENOUS at 13:10

## 2017-03-24 RX ADMIN — EPHEDRINE SULFATE 10 MG: 50 INJECTION, SOLUTION INTRAMUSCULAR; INTRAVENOUS; SUBCUTANEOUS at 13:10

## 2017-03-24 RX ADMIN — PROPOFOL 20 MG: 10 INJECTION, EMULSION INTRAVENOUS at 13:00

## 2017-03-24 RX ADMIN — FUROSEMIDE 40 MG: 40 TABLET ORAL at 10:47

## 2017-03-24 RX ADMIN — SODIUM CHLORIDE, SODIUM LACTATE, POTASSIUM CHLORIDE, AND CALCIUM CHLORIDE: .6; .31; .03; .02 INJECTION, SOLUTION INTRAVENOUS at 12:37

## 2017-03-24 RX ADMIN — PROPOFOL 50 MG: 10 INJECTION, EMULSION INTRAVENOUS at 12:56

## 2017-03-24 RX ADMIN — CARVEDILOL 12.5 MG: 6.25 TABLET, FILM COATED ORAL at 10:47

## 2017-03-24 RX ADMIN — ISOSORBIDE DINITRATE 20 MG: 10 TABLET ORAL at 10:47

## 2017-03-24 RX ADMIN — CARVEDILOL 12.5 MG: 6.25 TABLET, FILM COATED ORAL at 16:20

## 2017-03-24 RX ADMIN — HYDRALAZINE HYDROCHLORIDE 10 MG: 10 TABLET, FILM COATED ORAL at 20:40

## 2017-03-24 RX ADMIN — PROPOFOL 10 MG: 10 INJECTION, EMULSION INTRAVENOUS at 13:15

## 2017-03-24 RX ADMIN — SODIUM CHLORIDE 8 MG/HR: 9 INJECTION, SOLUTION INTRAVENOUS at 19:23

## 2017-03-24 RX ADMIN — ISOSORBIDE DINITRATE 20 MG: 10 TABLET ORAL at 18:25

## 2017-03-24 RX ADMIN — SODIUM CHLORIDE 8 MG/HR: 9 INJECTION, SOLUTION INTRAVENOUS at 05:54

## 2017-03-24 RX ADMIN — ALPRAZOLAM 0.25 MG: 0.25 TABLET ORAL at 20:44

## 2017-03-25 LAB
ANION GAP SERPL CALCULATED.3IONS-SCNC: 11 MMOL/L (ref 4–13)
BUN SERPL-MCNC: 33 MG/DL (ref 5–25)
CALCIUM SERPL-MCNC: 8.8 MG/DL (ref 8.3–10.1)
CHLORIDE SERPL-SCNC: 103 MMOL/L (ref 100–108)
CO2 SERPL-SCNC: 28 MMOL/L (ref 21–32)
CREAT SERPL-MCNC: 2.79 MG/DL (ref 0.6–1.3)
ERYTHROCYTE [DISTWIDTH] IN BLOOD BY AUTOMATED COUNT: 18.2 % (ref 11.6–15.1)
GFR SERPL CREATININE-BSD FRML MDRD: 21.7 ML/MIN/1.73SQ M
GLUCOSE SERPL-MCNC: 103 MG/DL (ref 65–140)
HCT VFR BLD AUTO: 30 % (ref 36.5–49.3)
HGB BLD-MCNC: 9.2 G/DL (ref 12–17)
MCH RBC QN AUTO: 24.1 PG (ref 26.8–34.3)
MCHC RBC AUTO-ENTMCNC: 30.7 G/DL (ref 31.4–37.4)
MCV RBC AUTO: 79 FL (ref 82–98)
PLATELET # BLD AUTO: 98 THOUSANDS/UL (ref 149–390)
PMV BLD AUTO: 12.2 FL (ref 8.9–12.7)
POTASSIUM SERPL-SCNC: 3.6 MMOL/L (ref 3.5–5.3)
RBC # BLD AUTO: 3.82 MILLION/UL (ref 3.88–5.62)
SODIUM SERPL-SCNC: 142 MMOL/L (ref 136–145)
WBC # BLD AUTO: 6.32 THOUSAND/UL (ref 4.31–10.16)

## 2017-03-25 PROCEDURE — 85027 COMPLETE CBC AUTOMATED: CPT | Performed by: INTERNAL MEDICINE

## 2017-03-25 PROCEDURE — C9113 INJ PANTOPRAZOLE SODIUM, VIA: HCPCS | Performed by: FAMILY MEDICINE

## 2017-03-25 PROCEDURE — G8978 MOBILITY CURRENT STATUS: HCPCS

## 2017-03-25 PROCEDURE — 97162 PT EVAL MOD COMPLEX 30 MIN: CPT

## 2017-03-25 PROCEDURE — G8979 MOBILITY GOAL STATUS: HCPCS

## 2017-03-25 PROCEDURE — 80048 BASIC METABOLIC PNL TOTAL CA: CPT | Performed by: INTERNAL MEDICINE

## 2017-03-25 RX ADMIN — CARVEDILOL 12.5 MG: 6.25 TABLET, FILM COATED ORAL at 17:15

## 2017-03-25 RX ADMIN — FUROSEMIDE 40 MG: 40 TABLET ORAL at 08:46

## 2017-03-25 RX ADMIN — ZOLPIDEM TARTRATE 5 MG: 5 TABLET, FILM COATED ORAL at 22:44

## 2017-03-25 RX ADMIN — SODIUM CHLORIDE 8 MG/HR: 9 INJECTION, SOLUTION INTRAVENOUS at 06:22

## 2017-03-25 RX ADMIN — ACETAMINOPHEN 650 MG: 325 TABLET ORAL at 12:13

## 2017-03-25 RX ADMIN — ISOSORBIDE DINITRATE 20 MG: 10 TABLET ORAL at 17:15

## 2017-03-25 RX ADMIN — CARVEDILOL 12.5 MG: 6.25 TABLET, FILM COATED ORAL at 08:47

## 2017-03-25 RX ADMIN — ISOSORBIDE DINITRATE 20 MG: 10 TABLET ORAL at 08:47

## 2017-03-25 RX ADMIN — SODIUM CHLORIDE 8 MG/HR: 9 INJECTION, SOLUTION INTRAVENOUS at 19:00

## 2017-03-26 VITALS
OXYGEN SATURATION: 98 % | SYSTOLIC BLOOD PRESSURE: 132 MMHG | WEIGHT: 180.78 LBS | RESPIRATION RATE: 18 BRPM | TEMPERATURE: 99.9 F | BODY MASS INDEX: 27.4 KG/M2 | DIASTOLIC BLOOD PRESSURE: 62 MMHG | HEIGHT: 68 IN | HEART RATE: 87 BPM

## 2017-03-26 LAB
ANION GAP SERPL CALCULATED.3IONS-SCNC: 9 MMOL/L (ref 4–13)
BUN SERPL-MCNC: 33 MG/DL (ref 5–25)
CALCIUM SERPL-MCNC: 8.7 MG/DL (ref 8.3–10.1)
CHLORIDE SERPL-SCNC: 104 MMOL/L (ref 100–108)
CO2 SERPL-SCNC: 28 MMOL/L (ref 21–32)
CREAT SERPL-MCNC: 3.06 MG/DL (ref 0.6–1.3)
ERYTHROCYTE [DISTWIDTH] IN BLOOD BY AUTOMATED COUNT: 18.2 % (ref 11.6–15.1)
GFR SERPL CREATININE-BSD FRML MDRD: 19.5 ML/MIN/1.73SQ M
GLUCOSE SERPL-MCNC: 104 MG/DL (ref 65–140)
HCT VFR BLD AUTO: 28.8 % (ref 36.5–49.3)
HGB BLD-MCNC: 8.9 G/DL (ref 12–17)
MCH RBC QN AUTO: 24.3 PG (ref 26.8–34.3)
MCHC RBC AUTO-ENTMCNC: 30.9 G/DL (ref 31.4–37.4)
MCV RBC AUTO: 79 FL (ref 82–98)
PLATELET # BLD AUTO: 94 THOUSANDS/UL (ref 149–390)
PMV BLD AUTO: 12.4 FL (ref 8.9–12.7)
POTASSIUM SERPL-SCNC: 3.7 MMOL/L (ref 3.5–5.3)
RBC # BLD AUTO: 3.66 MILLION/UL (ref 3.88–5.62)
SODIUM SERPL-SCNC: 141 MMOL/L (ref 136–145)
WBC # BLD AUTO: 5.97 THOUSAND/UL (ref 4.31–10.16)

## 2017-03-26 PROCEDURE — 80048 BASIC METABOLIC PNL TOTAL CA: CPT | Performed by: INTERNAL MEDICINE

## 2017-03-26 PROCEDURE — 85027 COMPLETE CBC AUTOMATED: CPT | Performed by: INTERNAL MEDICINE

## 2017-03-26 PROCEDURE — C9113 INJ PANTOPRAZOLE SODIUM, VIA: HCPCS | Performed by: FAMILY MEDICINE

## 2017-03-26 RX ORDER — PANTOPRAZOLE SODIUM 40 MG/1
40 TABLET, DELAYED RELEASE ORAL DAILY
Qty: 30 TABLET | Refills: 0 | Status: SHIPPED | OUTPATIENT
Start: 2017-03-26 | End: 2017-07-13

## 2017-03-26 RX ADMIN — ISOSORBIDE DINITRATE 20 MG: 10 TABLET ORAL at 10:19

## 2017-03-26 RX ADMIN — CARVEDILOL 12.5 MG: 6.25 TABLET, FILM COATED ORAL at 10:19

## 2017-03-26 RX ADMIN — FUROSEMIDE 40 MG: 40 TABLET ORAL at 10:18

## 2017-03-26 RX ADMIN — SODIUM CHLORIDE 8 MG/HR: 9 INJECTION, SOLUTION INTRAVENOUS at 05:14

## 2017-03-26 RX ADMIN — ACETAMINOPHEN 650 MG: 325 TABLET ORAL at 12:37

## 2017-03-29 ENCOUNTER — GENERIC CONVERSION - ENCOUNTER (OUTPATIENT)
Dept: OTHER | Facility: OTHER | Age: 82
End: 2017-03-29

## 2017-03-29 DIAGNOSIS — R71.0 PRECIPITOUS DROP IN HEMATOCRIT: ICD-10-CM

## 2017-03-29 DIAGNOSIS — N18.4 CHRONIC KIDNEY DISEASE, STAGE IV (SEVERE) (HCC): ICD-10-CM

## 2017-03-30 ENCOUNTER — LAB REQUISITION (OUTPATIENT)
Dept: LAB | Facility: HOSPITAL | Age: 82
End: 2017-03-30
Payer: MEDICARE

## 2017-03-30 DIAGNOSIS — R71.0 PRECIPITOUS DROP IN HEMATOCRIT: ICD-10-CM

## 2017-03-30 LAB
ERYTHROCYTE [DISTWIDTH] IN BLOOD BY AUTOMATED COUNT: 18.2 % (ref 11.6–15.1)
HCT VFR BLD AUTO: 30 % (ref 36.5–49.3)
HGB BLD-MCNC: 9 G/DL (ref 12–17)
MCH RBC QN AUTO: 24.1 PG (ref 26.8–34.3)
MCHC RBC AUTO-ENTMCNC: 30 G/DL (ref 31.4–37.4)
MCV RBC AUTO: 80 FL (ref 82–98)
PLATELET # BLD AUTO: 148 THOUSANDS/UL (ref 149–390)
PMV BLD AUTO: 12.2 FL (ref 8.9–12.7)
RBC # BLD AUTO: 3.73 MILLION/UL (ref 3.88–5.62)
WBC # BLD AUTO: 5.52 THOUSAND/UL (ref 4.31–10.16)

## 2017-03-30 PROCEDURE — 85027 COMPLETE CBC AUTOMATED: CPT | Performed by: INTERNAL MEDICINE

## 2017-03-31 ENCOUNTER — ALLSCRIPTS OFFICE VISIT (OUTPATIENT)
Dept: OTHER | Facility: OTHER | Age: 82
End: 2017-03-31

## 2017-03-31 ENCOUNTER — GENERIC CONVERSION - ENCOUNTER (OUTPATIENT)
Dept: OTHER | Facility: OTHER | Age: 82
End: 2017-03-31

## 2017-04-10 ENCOUNTER — ALLSCRIPTS OFFICE VISIT (OUTPATIENT)
Dept: OTHER | Facility: OTHER | Age: 82
End: 2017-04-10

## 2017-04-17 ENCOUNTER — LAB REQUISITION (OUTPATIENT)
Dept: LAB | Facility: HOSPITAL | Age: 82
End: 2017-04-17
Payer: MEDICARE

## 2017-04-17 DIAGNOSIS — I48.91 ATRIAL FIBRILLATION (HCC): ICD-10-CM

## 2017-04-17 LAB
INR PPP: 1.6 (ref 0.86–1.16)
PROTHROMBIN TIME: 19 SECONDS (ref 12–14.3)

## 2017-04-17 PROCEDURE — 85610 PROTHROMBIN TIME: CPT | Performed by: INTERNAL MEDICINE

## 2017-05-10 ENCOUNTER — ALLSCRIPTS OFFICE VISIT (OUTPATIENT)
Dept: OTHER | Facility: OTHER | Age: 82
End: 2017-05-10

## 2017-05-10 DIAGNOSIS — N18.4 CHRONIC KIDNEY DISEASE, STAGE IV (SEVERE) (HCC): ICD-10-CM

## 2017-07-10 ENCOUNTER — APPOINTMENT (EMERGENCY)
Dept: CT IMAGING | Facility: HOSPITAL | Age: 82
End: 2017-07-10
Payer: MEDICARE

## 2017-07-10 ENCOUNTER — HOSPITAL ENCOUNTER (EMERGENCY)
Facility: HOSPITAL | Age: 82
Discharge: HOME | End: 2017-07-10
Attending: EMERGENCY MEDICINE | Admitting: EMERGENCY MEDICINE
Payer: MEDICARE

## 2017-07-10 ENCOUNTER — APPOINTMENT (EMERGENCY)
Dept: RADIOLOGY | Facility: HOSPITAL | Age: 82
End: 2017-07-10
Payer: MEDICARE

## 2017-07-10 VITALS
SYSTOLIC BLOOD PRESSURE: 168 MMHG | TEMPERATURE: 97.5 F | HEART RATE: 77 BPM | DIASTOLIC BLOOD PRESSURE: 81 MMHG | WEIGHT: 171.3 LBS | BODY MASS INDEX: 24.52 KG/M2 | OXYGEN SATURATION: 100 % | HEIGHT: 70 IN | RESPIRATION RATE: 22 BRPM

## 2017-07-10 DIAGNOSIS — R06.00 DYSPNEA: Primary | ICD-10-CM

## 2017-07-10 DIAGNOSIS — R11.0 NAUSEA: ICD-10-CM

## 2017-07-10 LAB
ALBUMIN SERPL BCP-MCNC: 3.6 G/DL (ref 3.5–5)
ALP SERPL-CCNC: 101 U/L (ref 46–116)
ALT SERPL W P-5'-P-CCNC: 37 U/L (ref 12–78)
ANION GAP SERPL CALCULATED.3IONS-SCNC: 11 MMOL/L (ref 4–13)
AST SERPL W P-5'-P-CCNC: 39 U/L (ref 5–45)
ATRIAL RATE: 75 BPM
BACTERIA UR QL AUTO: ABNORMAL /HPF
BASOPHILS # BLD AUTO: 0.03 THOUSANDS/ΜL (ref 0–0.1)
BASOPHILS NFR BLD AUTO: 0 % (ref 0–1)
BILIRUB SERPL-MCNC: 0.8 MG/DL (ref 0.2–1)
BILIRUB UR QL STRIP: NEGATIVE
BUN SERPL-MCNC: 37 MG/DL (ref 5–25)
CALCIUM SERPL-MCNC: 8.9 MG/DL (ref 8.3–10.1)
CHLORIDE SERPL-SCNC: 99 MMOL/L (ref 100–108)
CLARITY UR: ABNORMAL
CO2 SERPL-SCNC: 27 MMOL/L (ref 21–32)
COLOR UR: YELLOW
CREAT SERPL-MCNC: 2.47 MG/DL (ref 0.6–1.3)
EOSINOPHIL # BLD AUTO: 0.26 THOUSAND/ΜL (ref 0–0.61)
EOSINOPHIL NFR BLD AUTO: 3 % (ref 0–6)
ERYTHROCYTE [DISTWIDTH] IN BLOOD BY AUTOMATED COUNT: 18.9 % (ref 11.6–15.1)
GFR SERPL CREATININE-BSD FRML MDRD: 24.9 ML/MIN/1.73SQ M
GLUCOSE SERPL-MCNC: 169 MG/DL (ref 65–140)
GLUCOSE UR STRIP-MCNC: NEGATIVE MG/DL
HCT VFR BLD AUTO: 35.1 % (ref 36.5–49.3)
HGB BLD-MCNC: 10.8 G/DL (ref 12–17)
HGB UR QL STRIP.AUTO: ABNORMAL
KETONES UR STRIP-MCNC: NEGATIVE MG/DL
LACTATE SERPL-SCNC: 1.2 MMOL/L (ref 0.5–2)
LEUKOCYTE ESTERASE UR QL STRIP: ABNORMAL
LIPASE SERPL-CCNC: 277 U/L (ref 73–393)
LYMPHOCYTES # BLD AUTO: 1.42 THOUSANDS/ΜL (ref 0.6–4.47)
LYMPHOCYTES NFR BLD AUTO: 17 % (ref 14–44)
MAGNESIUM SERPL-MCNC: 2.3 MG/DL (ref 1.6–2.6)
MCH RBC QN AUTO: 25.1 PG (ref 26.8–34.3)
MCHC RBC AUTO-ENTMCNC: 30.8 G/DL (ref 31.4–37.4)
MCV RBC AUTO: 81 FL (ref 82–98)
MONOCYTES # BLD AUTO: 0.64 THOUSAND/ΜL (ref 0.17–1.22)
MONOCYTES NFR BLD AUTO: 8 % (ref 4–12)
NEUTROPHILS # BLD AUTO: 5.8 THOUSANDS/ΜL (ref 1.85–7.62)
NEUTS SEG NFR BLD AUTO: 71 % (ref 43–75)
NITRITE UR QL STRIP: NEGATIVE
NON-SQ EPI CELLS URNS QL MICRO: ABNORMAL /HPF
NRBC BLD AUTO-RTO: 0 /100 WBCS
NT-PROBNP SERPL-MCNC: 6751 PG/ML
P AXIS: 46 DEGREES
PH UR STRIP.AUTO: 6.5 [PH] (ref 4.5–8)
PLATELET # BLD AUTO: 162 THOUSANDS/UL (ref 149–390)
PMV BLD AUTO: 10.9 FL (ref 8.9–12.7)
POTASSIUM SERPL-SCNC: 4.2 MMOL/L (ref 3.5–5.3)
PR INTERVAL: 236 MS
PROT SERPL-MCNC: 8.7 G/DL (ref 6.4–8.2)
PROT UR STRIP-MCNC: ABNORMAL MG/DL
QRS AXIS: 105 DEGREES
QRSD INTERVAL: 160 MS
QT INTERVAL: 472 MS
QTC INTERVAL: 527 MS
RBC # BLD AUTO: 4.31 MILLION/UL (ref 3.88–5.62)
RBC #/AREA URNS AUTO: ABNORMAL /HPF
SODIUM SERPL-SCNC: 137 MMOL/L (ref 136–145)
SP GR UR STRIP.AUTO: <=1.005 (ref 1–1.03)
T WAVE AXIS: 35 DEGREES
TROPONIN I SERPL-MCNC: 0.02 NG/ML
UROBILINOGEN UR QL STRIP.AUTO: 0.2 E.U./DL
VENTRICULAR RATE: 75 BPM
WBC # BLD AUTO: 8.17 THOUSAND/UL (ref 4.31–10.16)
WBC #/AREA URNS AUTO: ABNORMAL /HPF

## 2017-07-10 PROCEDURE — 74176 CT ABD & PELVIS W/O CONTRAST: CPT

## 2017-07-10 PROCEDURE — 96375 TX/PRO/DX INJ NEW DRUG ADDON: CPT

## 2017-07-10 PROCEDURE — 83605 ASSAY OF LACTIC ACID: CPT | Performed by: EMERGENCY MEDICINE

## 2017-07-10 PROCEDURE — 83690 ASSAY OF LIPASE: CPT | Performed by: EMERGENCY MEDICINE

## 2017-07-10 PROCEDURE — 36415 COLL VENOUS BLD VENIPUNCTURE: CPT | Performed by: EMERGENCY MEDICINE

## 2017-07-10 PROCEDURE — 85025 COMPLETE CBC W/AUTO DIFF WBC: CPT | Performed by: EMERGENCY MEDICINE

## 2017-07-10 PROCEDURE — 80053 COMPREHEN METABOLIC PANEL: CPT | Performed by: EMERGENCY MEDICINE

## 2017-07-10 PROCEDURE — 83880 ASSAY OF NATRIURETIC PEPTIDE: CPT | Performed by: EMERGENCY MEDICINE

## 2017-07-10 PROCEDURE — 81001 URINALYSIS AUTO W/SCOPE: CPT | Performed by: EMERGENCY MEDICINE

## 2017-07-10 PROCEDURE — 84484 ASSAY OF TROPONIN QUANT: CPT | Performed by: EMERGENCY MEDICINE

## 2017-07-10 PROCEDURE — 83735 ASSAY OF MAGNESIUM: CPT | Performed by: EMERGENCY MEDICINE

## 2017-07-10 PROCEDURE — 96374 THER/PROPH/DIAG INJ IV PUSH: CPT

## 2017-07-10 PROCEDURE — 87086 URINE CULTURE/COLONY COUNT: CPT | Performed by: EMERGENCY MEDICINE

## 2017-07-10 PROCEDURE — 87077 CULTURE AEROBIC IDENTIFY: CPT | Performed by: EMERGENCY MEDICINE

## 2017-07-10 PROCEDURE — 87186 SC STD MICRODIL/AGAR DIL: CPT | Performed by: EMERGENCY MEDICINE

## 2017-07-10 PROCEDURE — 93005 ELECTROCARDIOGRAM TRACING: CPT | Performed by: EMERGENCY MEDICINE

## 2017-07-10 PROCEDURE — 99285 EMERGENCY DEPT VISIT HI MDM: CPT

## 2017-07-10 PROCEDURE — 71020 HB CHEST X-RAY 2VW FRONTAL&LATL: CPT

## 2017-07-10 RX ORDER — HYDRALAZINE HYDROCHLORIDE 20 MG/ML
5 INJECTION INTRAMUSCULAR; INTRAVENOUS ONCE
Status: COMPLETED | OUTPATIENT
Start: 2017-07-10 | End: 2017-07-10

## 2017-07-10 RX ORDER — ONDANSETRON 2 MG/ML
4 INJECTION INTRAMUSCULAR; INTRAVENOUS ONCE
Status: COMPLETED | OUTPATIENT
Start: 2017-07-10 | End: 2017-07-10

## 2017-07-10 RX ORDER — FENTANYL CITRATE 50 UG/ML
50 INJECTION, SOLUTION INTRAMUSCULAR; INTRAVENOUS ONCE
Status: COMPLETED | OUTPATIENT
Start: 2017-07-10 | End: 2017-07-10

## 2017-07-10 RX ADMIN — FENTANYL CITRATE 50 MCG: 50 INJECTION, SOLUTION INTRAMUSCULAR; INTRAVENOUS at 14:15

## 2017-07-10 RX ADMIN — ONDANSETRON 4 MG: 2 INJECTION INTRAMUSCULAR; INTRAVENOUS at 14:16

## 2017-07-10 RX ADMIN — HYDRALAZINE HYDROCHLORIDE 5 MG: 20 INJECTION INTRAMUSCULAR; INTRAVENOUS at 14:15

## 2017-07-13 ENCOUNTER — HOSPITAL ENCOUNTER (INPATIENT)
Facility: HOSPITAL | Age: 82
LOS: 3 days | Discharge: HOME HEALTH | DRG: 065 | End: 2017-07-17
Attending: EMERGENCY MEDICINE | Admitting: INTERNAL MEDICINE
Payer: MEDICARE

## 2017-07-13 ENCOUNTER — APPOINTMENT (EMERGENCY)
Dept: RADIOLOGY | Facility: HOSPITAL | Age: 82
DRG: 065 | End: 2017-07-13
Payer: MEDICARE

## 2017-07-13 ENCOUNTER — APPOINTMENT (EMERGENCY)
Dept: CT IMAGING | Facility: HOSPITAL | Age: 82
DRG: 065 | End: 2017-07-13
Payer: MEDICARE

## 2017-07-13 DIAGNOSIS — I48.91 ATRIAL FIBRILLATION (HCC): ICD-10-CM

## 2017-07-13 DIAGNOSIS — R53.1 GENERALIZED WEAKNESS: ICD-10-CM

## 2017-07-13 DIAGNOSIS — G45.0 VERTEBROBASILAR INSUFFICIENCY: ICD-10-CM

## 2017-07-13 DIAGNOSIS — R42 DIZZINESS: Primary | ICD-10-CM

## 2017-07-13 DIAGNOSIS — I63.9 CEREBELLAR STROKE, ACUTE (HCC): ICD-10-CM

## 2017-07-13 PROBLEM — Z98.890 HISTORY OF ILEAL CONDUIT: Status: ACTIVE | Noted: 2017-07-13

## 2017-07-13 PROBLEM — N39.0 URINARY TRACT INFECTION: Status: ACTIVE | Noted: 2017-07-13

## 2017-07-13 LAB
ANION GAP SERPL CALCULATED.3IONS-SCNC: 8 MMOL/L (ref 4–13)
BACTERIA UR CULT: NORMAL
BACTERIA UR QL AUTO: ABNORMAL /HPF
BASOPHILS # BLD AUTO: 0.05 THOUSANDS/ΜL (ref 0–0.1)
BASOPHILS NFR BLD AUTO: 1 % (ref 0–1)
BILIRUB UR QL STRIP: NEGATIVE
BUN SERPL-MCNC: 43 MG/DL (ref 5–25)
CALCIUM SERPL-MCNC: 9.2 MG/DL (ref 8.3–10.1)
CHLORIDE SERPL-SCNC: 102 MMOL/L (ref 100–108)
CLARITY UR: ABNORMAL
CO2 SERPL-SCNC: 31 MMOL/L (ref 21–32)
COLOR UR: YELLOW
CREAT SERPL-MCNC: 2.65 MG/DL (ref 0.6–1.3)
EOSINOPHIL # BLD AUTO: 0.17 THOUSAND/ΜL (ref 0–0.61)
EOSINOPHIL NFR BLD AUTO: 3 % (ref 0–6)
ERYTHROCYTE [DISTWIDTH] IN BLOOD BY AUTOMATED COUNT: 18.7 % (ref 11.6–15.1)
ERYTHROCYTE [SEDIMENTATION RATE] IN BLOOD: 35 MM/HOUR (ref 0–10)
GFR SERPL CREATININE-BSD FRML MDRD: 23 ML/MIN/1.73SQ M
GLUCOSE SERPL-MCNC: 122 MG/DL (ref 65–140)
GLUCOSE UR STRIP-MCNC: NEGATIVE MG/DL
HCT VFR BLD AUTO: 36.4 % (ref 36.5–49.3)
HGB BLD-MCNC: 11.2 G/DL (ref 12–17)
HGB UR QL STRIP.AUTO: ABNORMAL
INR PPP: 2.77 (ref 0.86–1.16)
KETONES UR STRIP-MCNC: NEGATIVE MG/DL
LEUKOCYTE ESTERASE UR QL STRIP: ABNORMAL
LYMPHOCYTES # BLD AUTO: 1.62 THOUSANDS/ΜL (ref 0.6–4.47)
LYMPHOCYTES NFR BLD AUTO: 24 % (ref 14–44)
MCH RBC QN AUTO: 25.4 PG (ref 26.8–34.3)
MCHC RBC AUTO-ENTMCNC: 30.8 G/DL (ref 31.4–37.4)
MCV RBC AUTO: 83 FL (ref 82–98)
MONOCYTES # BLD AUTO: 0.68 THOUSAND/ΜL (ref 0.17–1.22)
MONOCYTES NFR BLD AUTO: 10 % (ref 4–12)
NEUTROPHILS # BLD AUTO: 4.21 THOUSANDS/ΜL (ref 1.85–7.62)
NEUTS SEG NFR BLD AUTO: 62 % (ref 43–75)
NITRITE UR QL STRIP: POSITIVE
NON-SQ EPI CELLS URNS QL MICRO: ABNORMAL /HPF
NRBC BLD AUTO-RTO: 0 /100 WBCS
PH UR STRIP.AUTO: 6.5 [PH] (ref 4.5–8)
PLATELET # BLD AUTO: 137 THOUSANDS/UL (ref 149–390)
PMV BLD AUTO: 10.7 FL (ref 8.9–12.7)
POTASSIUM SERPL-SCNC: 3.8 MMOL/L (ref 3.5–5.3)
PROT UR STRIP-MCNC: ABNORMAL MG/DL
PROTHROMBIN TIME: 30 SECONDS (ref 12.1–14.4)
RBC # BLD AUTO: 4.41 MILLION/UL (ref 3.88–5.62)
RBC #/AREA URNS AUTO: ABNORMAL /HPF
SODIUM SERPL-SCNC: 141 MMOL/L (ref 136–145)
SP GR UR STRIP.AUTO: 1.01 (ref 1–1.03)
TROPONIN I SERPL-MCNC: 0.03 NG/ML
UROBILINOGEN UR QL STRIP.AUTO: 0.2 E.U./DL
VIT B12 SERPL-MCNC: 431 PG/ML (ref 100–900)
WBC # BLD AUTO: 6.75 THOUSAND/UL (ref 4.31–10.16)
WBC #/AREA URNS AUTO: ABNORMAL /HPF

## 2017-07-13 PROCEDURE — 96374 THER/PROPH/DIAG INJ IV PUSH: CPT

## 2017-07-13 PROCEDURE — 86617 LYME DISEASE ANTIBODY: CPT | Performed by: PSYCHIATRY & NEUROLOGY

## 2017-07-13 PROCEDURE — 99285 EMERGENCY DEPT VISIT HI MDM: CPT

## 2017-07-13 PROCEDURE — 85025 COMPLETE CBC W/AUTO DIFF WBC: CPT | Performed by: EMERGENCY MEDICINE

## 2017-07-13 PROCEDURE — 82607 VITAMIN B-12: CPT | Performed by: PSYCHIATRY & NEUROLOGY

## 2017-07-13 PROCEDURE — 85652 RBC SED RATE AUTOMATED: CPT | Performed by: PSYCHIATRY & NEUROLOGY

## 2017-07-13 PROCEDURE — 85610 PROTHROMBIN TIME: CPT | Performed by: NURSE PRACTITIONER

## 2017-07-13 PROCEDURE — 87186 SC STD MICRODIL/AGAR DIL: CPT | Performed by: EMERGENCY MEDICINE

## 2017-07-13 PROCEDURE — 80048 BASIC METABOLIC PNL TOTAL CA: CPT | Performed by: EMERGENCY MEDICINE

## 2017-07-13 PROCEDURE — 36415 COLL VENOUS BLD VENIPUNCTURE: CPT | Performed by: EMERGENCY MEDICINE

## 2017-07-13 PROCEDURE — 84165 PROTEIN E-PHORESIS SERUM: CPT | Performed by: PSYCHIATRY & NEUROLOGY

## 2017-07-13 PROCEDURE — 84484 ASSAY OF TROPONIN QUANT: CPT | Performed by: EMERGENCY MEDICINE

## 2017-07-13 PROCEDURE — 87040 BLOOD CULTURE FOR BACTERIA: CPT | Performed by: NURSE PRACTITIONER

## 2017-07-13 PROCEDURE — 86618 LYME DISEASE ANTIBODY: CPT | Performed by: PSYCHIATRY & NEUROLOGY

## 2017-07-13 PROCEDURE — 87086 URINE CULTURE/COLONY COUNT: CPT | Performed by: EMERGENCY MEDICINE

## 2017-07-13 PROCEDURE — 93005 ELECTROCARDIOGRAM TRACING: CPT | Performed by: EMERGENCY MEDICINE

## 2017-07-13 PROCEDURE — 81001 URINALYSIS AUTO W/SCOPE: CPT | Performed by: EMERGENCY MEDICINE

## 2017-07-13 PROCEDURE — 70450 CT HEAD/BRAIN W/O DYE: CPT

## 2017-07-13 PROCEDURE — 87077 CULTURE AEROBIC IDENTIFY: CPT | Performed by: EMERGENCY MEDICINE

## 2017-07-13 PROCEDURE — 71020 HB CHEST X-RAY 2VW FRONTAL&LATL: CPT

## 2017-07-13 RX ORDER — WARFARIN SODIUM 1 MG/1
0.5 TABLET ORAL EVERY OTHER DAY
Status: DISCONTINUED | OUTPATIENT
Start: 2017-07-14 | End: 2017-07-15 | Stop reason: DRUGHIGH

## 2017-07-13 RX ORDER — ALBUTEROL SULFATE 90 UG/1
2 AEROSOL, METERED RESPIRATORY (INHALATION) EVERY 6 HOURS PRN
Status: DISCONTINUED | OUTPATIENT
Start: 2017-07-13 | End: 2017-07-17 | Stop reason: HOSPADM

## 2017-07-13 RX ORDER — HYDRALAZINE HYDROCHLORIDE 10 MG/1
10 TABLET, FILM COATED ORAL ONCE
Status: COMPLETED | OUTPATIENT
Start: 2017-07-13 | End: 2017-07-13

## 2017-07-13 RX ORDER — CARVEDILOL 6.25 MG/1
6.25 TABLET ORAL 2 TIMES DAILY WITH MEALS
Status: DISCONTINUED | OUTPATIENT
Start: 2017-07-13 | End: 2017-07-17 | Stop reason: HOSPADM

## 2017-07-13 RX ORDER — POLYETHYLENE GLYCOL 3350 17 G/17G
17 POWDER, FOR SOLUTION ORAL DAILY
Status: DISCONTINUED | OUTPATIENT
Start: 2017-07-13 | End: 2017-07-17 | Stop reason: HOSPADM

## 2017-07-13 RX ORDER — WARFARIN SODIUM 1 MG/1
0.5 TABLET ORAL EVERY OTHER DAY
Status: ON HOLD | COMMUNITY
End: 2017-07-17

## 2017-07-13 RX ORDER — HYDRALAZINE HYDROCHLORIDE 10 MG/1
10 TABLET, FILM COATED ORAL 3 TIMES DAILY
Status: DISCONTINUED | OUTPATIENT
Start: 2017-07-13 | End: 2017-07-14

## 2017-07-13 RX ORDER — ONDANSETRON 2 MG/ML
4 INJECTION INTRAMUSCULAR; INTRAVENOUS EVERY 6 HOURS PRN
Status: DISCONTINUED | OUTPATIENT
Start: 2017-07-13 | End: 2017-07-17 | Stop reason: HOSPADM

## 2017-07-13 RX ORDER — FUROSEMIDE 40 MG/1
40 TABLET ORAL DAILY
Status: CANCELLED | OUTPATIENT
Start: 2017-07-13

## 2017-07-13 RX ORDER — ACETAMINOPHEN 325 MG/1
650 TABLET ORAL EVERY 6 HOURS PRN
Status: DISCONTINUED | OUTPATIENT
Start: 2017-07-13 | End: 2017-07-17 | Stop reason: HOSPADM

## 2017-07-13 RX ORDER — ALPRAZOLAM 0.25 MG/1
0.25 TABLET ORAL
Status: DISCONTINUED | OUTPATIENT
Start: 2017-07-13 | End: 2017-07-17 | Stop reason: HOSPADM

## 2017-07-13 RX ORDER — ISOSORBIDE DINITRATE 10 MG/1
20 TABLET ORAL 2 TIMES DAILY
Status: DISCONTINUED | OUTPATIENT
Start: 2017-07-13 | End: 2017-07-14

## 2017-07-13 RX ORDER — WARFARIN SODIUM 1 MG/1
1 TABLET ORAL EVERY OTHER DAY
Status: DISCONTINUED | OUTPATIENT
Start: 2017-07-13 | End: 2017-07-15

## 2017-07-13 RX ORDER — ONDANSETRON 2 MG/ML
4 INJECTION INTRAMUSCULAR; INTRAVENOUS ONCE
Status: COMPLETED | OUTPATIENT
Start: 2017-07-13 | End: 2017-07-13

## 2017-07-13 RX ORDER — WARFARIN SODIUM 1 MG/1
1 TABLET ORAL EVERY OTHER DAY
COMMUNITY
End: 2017-07-17 | Stop reason: HOSPADM

## 2017-07-13 RX ADMIN — CARVEDILOL 6.25 MG: 6.25 TABLET, FILM COATED ORAL at 17:15

## 2017-07-13 RX ADMIN — HYDRALAZINE HYDROCHLORIDE 10 MG: 10 TABLET, FILM COATED ORAL at 21:31

## 2017-07-13 RX ADMIN — CEFEPIME 1000 MG: 1 INJECTION, POWDER, FOR SOLUTION INTRAMUSCULAR; INTRAVENOUS at 15:25

## 2017-07-13 RX ADMIN — HYDRALAZINE HYDROCHLORIDE 10 MG: 10 TABLET, FILM COATED ORAL at 13:54

## 2017-07-13 RX ADMIN — WARFARIN SODIUM 1 MG: 1 TABLET ORAL at 17:15

## 2017-07-13 RX ADMIN — ISOSORBIDE DINITRATE 20 MG: 10 TABLET ORAL at 21:31

## 2017-07-13 RX ADMIN — ONDANSETRON 4 MG: 2 INJECTION INTRAMUSCULAR; INTRAVENOUS at 11:45

## 2017-07-14 PROBLEM — R42 DIZZINESS: Chronic | Status: ACTIVE | Noted: 2017-07-13

## 2017-07-14 LAB
ALBUMIN SERPL BCP-MCNC: 3.1 G/DL (ref 3.5–5)
ALBUMIN SERPL ELPH-MCNC: 4.11 G/DL (ref 3.5–5)
ALBUMIN SERPL ELPH-MCNC: 50.7 % (ref 52–65)
ALP SERPL-CCNC: 74 U/L (ref 46–116)
ALPHA1 GLOB SERPL ELPH-MCNC: 0.32 G/DL (ref 0.1–0.4)
ALPHA1 GLOB SERPL ELPH-MCNC: 4 % (ref 2.5–5)
ALPHA2 GLOB SERPL ELPH-MCNC: 0.89 G/DL (ref 0.4–1.2)
ALPHA2 GLOB SERPL ELPH-MCNC: 11 % (ref 7–13)
ALT SERPL W P-5'-P-CCNC: 30 U/L (ref 12–78)
ANION GAP SERPL CALCULATED.3IONS-SCNC: 6 MMOL/L (ref 4–13)
AST SERPL W P-5'-P-CCNC: 28 U/L (ref 5–45)
BETA GLOB ABNORMAL SERPL ELPH-MCNC: 0.55 G/DL (ref 0.4–0.8)
BETA1 GLOB SERPL ELPH-MCNC: 6.8 % (ref 5–13)
BETA2 GLOB SERPL ELPH-MCNC: 4 % (ref 2–8)
BETA2+GAMMA GLOB SERPL ELPH-MCNC: 0.32 G/DL (ref 0.2–0.5)
BILIRUB SERPL-MCNC: 0.6 MG/DL (ref 0.2–1)
BUN SERPL-MCNC: 41 MG/DL (ref 5–25)
CALCIUM SERPL-MCNC: 8.7 MG/DL (ref 8.3–10.1)
CHLORIDE SERPL-SCNC: 106 MMOL/L (ref 100–108)
CO2 SERPL-SCNC: 30 MMOL/L (ref 21–32)
CREAT SERPL-MCNC: 2.55 MG/DL (ref 0.6–1.3)
ERYTHROCYTE [DISTWIDTH] IN BLOOD BY AUTOMATED COUNT: 18.7 % (ref 11.6–15.1)
FOLATE SERPL-MCNC: 19.4 NG/ML (ref 3.1–17.5)
GAMMA GLOB ABNORMAL SERPL ELPH-MCNC: 1.9 G/DL (ref 0.5–1.6)
GAMMA GLOB SERPL ELPH-MCNC: 23.5 % (ref 12–22)
GFR SERPL CREATININE-BSD FRML MDRD: 24 ML/MIN/1.73SQ M
GLUCOSE P FAST SERPL-MCNC: 99 MG/DL (ref 65–99)
GLUCOSE SERPL-MCNC: 99 MG/DL (ref 65–140)
HCT VFR BLD AUTO: 33.9 % (ref 36.5–49.3)
HGB BLD-MCNC: 10.4 G/DL (ref 12–17)
IGG/ALB SER: 1.03 {RATIO} (ref 1.1–1.8)
INR PPP: 2.93 (ref 0.86–1.16)
MAGNESIUM SERPL-MCNC: 2.2 MG/DL (ref 1.6–2.6)
MCH RBC QN AUTO: 25.5 PG (ref 26.8–34.3)
MCHC RBC AUTO-ENTMCNC: 30.7 G/DL (ref 31.4–37.4)
MCV RBC AUTO: 83 FL (ref 82–98)
PLATELET # BLD AUTO: 119 THOUSANDS/UL (ref 149–390)
PMV BLD AUTO: 10.5 FL (ref 8.9–12.7)
POTASSIUM SERPL-SCNC: 3.8 MMOL/L (ref 3.5–5.3)
PROT PATTERN SERPL ELPH-IMP: ABNORMAL
PROT SERPL-MCNC: 7.6 G/DL (ref 6.4–8.2)
PROT SERPL-MCNC: 8.1 G/DL (ref 6.4–8.2)
PROTHROMBIN TIME: 31.4 SECONDS (ref 12.1–14.4)
RBC # BLD AUTO: 4.08 MILLION/UL (ref 3.88–5.62)
SODIUM SERPL-SCNC: 142 MMOL/L (ref 136–145)
WBC # BLD AUTO: 5.42 THOUSAND/UL (ref 4.31–10.16)

## 2017-07-14 PROCEDURE — 85027 COMPLETE CBC AUTOMATED: CPT | Performed by: NURSE PRACTITIONER

## 2017-07-14 PROCEDURE — 80053 COMPREHEN METABOLIC PANEL: CPT | Performed by: NURSE PRACTITIONER

## 2017-07-14 PROCEDURE — G8978 MOBILITY CURRENT STATUS: HCPCS

## 2017-07-14 PROCEDURE — 97167 OT EVAL HIGH COMPLEX 60 MIN: CPT

## 2017-07-14 PROCEDURE — 82746 ASSAY OF FOLIC ACID SERUM: CPT | Performed by: PSYCHIATRY & NEUROLOGY

## 2017-07-14 PROCEDURE — G8988 SELF CARE GOAL STATUS: HCPCS

## 2017-07-14 PROCEDURE — 85610 PROTHROMBIN TIME: CPT | Performed by: NURSE PRACTITIONER

## 2017-07-14 PROCEDURE — 97163 PT EVAL HIGH COMPLEX 45 MIN: CPT

## 2017-07-14 PROCEDURE — 83735 ASSAY OF MAGNESIUM: CPT | Performed by: NURSE PRACTITIONER

## 2017-07-14 PROCEDURE — G8979 MOBILITY GOAL STATUS: HCPCS

## 2017-07-14 PROCEDURE — G8987 SELF CARE CURRENT STATUS: HCPCS

## 2017-07-14 RX ORDER — ISOSORBIDE DINITRATE 10 MG/1
20 TABLET ORAL 2 TIMES DAILY
Status: DISCONTINUED | OUTPATIENT
Start: 2017-07-14 | End: 2017-07-17 | Stop reason: HOSPADM

## 2017-07-14 RX ORDER — SODIUM CHLORIDE 9 MG/ML
50 INJECTION, SOLUTION INTRAVENOUS ONCE
Status: COMPLETED | OUTPATIENT
Start: 2017-07-14 | End: 2017-07-15

## 2017-07-14 RX ORDER — HYDRALAZINE HYDROCHLORIDE 10 MG/1
10 TABLET, FILM COATED ORAL 3 TIMES DAILY
Status: DISCONTINUED | OUTPATIENT
Start: 2017-07-14 | End: 2017-07-15

## 2017-07-14 RX ADMIN — CARVEDILOL 6.25 MG: 6.25 TABLET, FILM COATED ORAL at 17:34

## 2017-07-14 RX ADMIN — CARVEDILOL 6.25 MG: 6.25 TABLET, FILM COATED ORAL at 07:48

## 2017-07-14 RX ADMIN — HYDRALAZINE HYDROCHLORIDE 10 MG: 10 TABLET, FILM COATED ORAL at 15:08

## 2017-07-14 RX ADMIN — HYDRALAZINE HYDROCHLORIDE 10 MG: 10 TABLET, FILM COATED ORAL at 21:41

## 2017-07-14 RX ADMIN — ISOSORBIDE DINITRATE 20 MG: 10 TABLET ORAL at 21:41

## 2017-07-14 RX ADMIN — HYDRALAZINE HYDROCHLORIDE 10 MG: 10 TABLET, FILM COATED ORAL at 08:30

## 2017-07-14 RX ADMIN — SODIUM CHLORIDE 50 ML/HR: 0.9 INJECTION, SOLUTION INTRAVENOUS at 17:35

## 2017-07-14 RX ADMIN — ONDANSETRON 4 MG: 2 INJECTION INTRAMUSCULAR; INTRAVENOUS at 08:40

## 2017-07-14 RX ADMIN — WARFARIN SODIUM 0.5 MG: 1 TABLET ORAL at 17:34

## 2017-07-14 RX ADMIN — ISOSORBIDE DINITRATE 20 MG: 10 TABLET ORAL at 08:31

## 2017-07-14 RX ADMIN — CEFEPIME 1000 MG: 1 INJECTION, POWDER, FOR SOLUTION INTRAMUSCULAR; INTRAVENOUS at 15:11

## 2017-07-15 ENCOUNTER — APPOINTMENT (INPATIENT)
Dept: MRI IMAGING | Facility: HOSPITAL | Age: 82
DRG: 065 | End: 2017-07-15
Payer: MEDICARE

## 2017-07-15 LAB
ATRIAL RATE: 65 BPM
BACTERIA UR CULT: NORMAL
INR PPP: 3.32 (ref 0.86–1.16)
P AXIS: 62 DEGREES
PR INTERVAL: 228 MS
PROTHROMBIN TIME: 34.7 SECONDS (ref 12.1–14.4)
QRS AXIS: 96 DEGREES
QRSD INTERVAL: 156 MS
QT INTERVAL: 484 MS
QTC INTERVAL: 503 MS
T WAVE AXIS: 28 DEGREES
VENTRICULAR RATE: 65 BPM

## 2017-07-15 PROCEDURE — 85610 PROTHROMBIN TIME: CPT | Performed by: PHYSICIAN ASSISTANT

## 2017-07-15 PROCEDURE — 70547 MR ANGIOGRAPHY NECK W/O DYE: CPT

## 2017-07-15 PROCEDURE — 70544 MR ANGIOGRAPHY HEAD W/O DYE: CPT

## 2017-07-15 PROCEDURE — 70551 MRI BRAIN STEM W/O DYE: CPT

## 2017-07-15 RX ORDER — ASPIRIN 81 MG/1
81 TABLET, CHEWABLE ORAL DAILY
Status: DISCONTINUED | OUTPATIENT
Start: 2017-07-16 | End: 2017-07-17 | Stop reason: HOSPADM

## 2017-07-15 RX ORDER — WARFARIN SODIUM 1 MG/1
1 TABLET ORAL EVERY OTHER DAY
Status: DISCONTINUED | OUTPATIENT
Start: 2017-07-15 | End: 2017-07-15 | Stop reason: DRUGHIGH

## 2017-07-15 RX ORDER — WARFARIN SODIUM 1 MG/1
0.5 TABLET ORAL
Status: DISCONTINUED | OUTPATIENT
Start: 2017-07-16 | End: 2017-07-17 | Stop reason: HOSPADM

## 2017-07-15 RX ADMIN — CARVEDILOL 6.25 MG: 6.25 TABLET, FILM COATED ORAL at 08:42

## 2017-07-15 RX ADMIN — HYDRALAZINE HYDROCHLORIDE 10 MG: 10 TABLET, FILM COATED ORAL at 06:27

## 2017-07-15 RX ADMIN — ISOSORBIDE DINITRATE 20 MG: 10 TABLET ORAL at 08:42

## 2017-07-15 RX ADMIN — ALPRAZOLAM 0.12 MG: 0.25 TABLET ORAL at 22:44

## 2017-07-15 RX ADMIN — AMPICILLIN SODIUM AND SULBACTAM SODIUM 3 G: 2; 1 INJECTION, POWDER, FOR SOLUTION INTRAMUSCULAR; INTRAVENOUS at 15:12

## 2017-07-15 RX ADMIN — CARVEDILOL 6.25 MG: 6.25 TABLET, FILM COATED ORAL at 17:30

## 2017-07-15 RX ADMIN — ISOSORBIDE DINITRATE 20 MG: 10 TABLET ORAL at 22:35

## 2017-07-15 RX ADMIN — ONDANSETRON 4 MG: 2 INJECTION INTRAMUSCULAR; INTRAVENOUS at 10:58

## 2017-07-16 PROBLEM — I63.9 CEREBELLAR STROKE, ACUTE (HCC): Status: ACTIVE | Noted: 2017-07-16

## 2017-07-16 LAB
ANION GAP SERPL CALCULATED.3IONS-SCNC: 3 MMOL/L (ref 4–13)
B BURGDOR IGG SER IA-ACNC: 2.38
B BURGDOR IGM SER IA-ACNC: 0.32
BUN SERPL-MCNC: 36 MG/DL (ref 5–25)
CALCIUM SERPL-MCNC: 9.2 MG/DL (ref 8.3–10.1)
CHLORIDE SERPL-SCNC: 106 MMOL/L (ref 100–108)
CO2 SERPL-SCNC: 31 MMOL/L (ref 21–32)
CREAT SERPL-MCNC: 2.32 MG/DL (ref 0.6–1.3)
ERYTHROCYTE [DISTWIDTH] IN BLOOD BY AUTOMATED COUNT: 18.5 % (ref 11.6–15.1)
GFR SERPL CREATININE-BSD FRML MDRD: 26.8 ML/MIN/1.73SQ M
GLUCOSE SERPL-MCNC: 115 MG/DL (ref 65–140)
HCT VFR BLD AUTO: 35.2 % (ref 36.5–49.3)
HGB BLD-MCNC: 10.6 G/DL (ref 12–17)
INR PPP: 2.72 (ref 0.86–1.16)
MCH RBC QN AUTO: 25.2 PG (ref 26.8–34.3)
MCHC RBC AUTO-ENTMCNC: 30.1 G/DL (ref 31.4–37.4)
MCV RBC AUTO: 84 FL (ref 82–98)
PLATELET # BLD AUTO: 107 THOUSANDS/UL (ref 149–390)
PMV BLD AUTO: 10.5 FL (ref 8.9–12.7)
POTASSIUM SERPL-SCNC: 4.9 MMOL/L (ref 3.5–5.3)
PROTHROMBIN TIME: 29.6 SECONDS (ref 12.1–14.4)
RBC # BLD AUTO: 4.2 MILLION/UL (ref 3.88–5.62)
SODIUM SERPL-SCNC: 140 MMOL/L (ref 136–145)
WBC # BLD AUTO: 5.44 THOUSAND/UL (ref 4.31–10.16)

## 2017-07-16 PROCEDURE — 85610 PROTHROMBIN TIME: CPT | Performed by: PHYSICIAN ASSISTANT

## 2017-07-16 PROCEDURE — 85027 COMPLETE CBC AUTOMATED: CPT | Performed by: PHYSICIAN ASSISTANT

## 2017-07-16 PROCEDURE — 80048 BASIC METABOLIC PNL TOTAL CA: CPT | Performed by: PHYSICIAN ASSISTANT

## 2017-07-16 RX ORDER — ATORVASTATIN CALCIUM 40 MG/1
40 TABLET, FILM COATED ORAL EVERY EVENING
Status: DISCONTINUED | OUTPATIENT
Start: 2017-07-16 | End: 2017-07-17 | Stop reason: HOSPADM

## 2017-07-16 RX ADMIN — ATORVASTATIN CALCIUM 40 MG: 40 TABLET, FILM COATED ORAL at 17:04

## 2017-07-16 RX ADMIN — ISOSORBIDE DINITRATE 20 MG: 10 TABLET ORAL at 08:08

## 2017-07-16 RX ADMIN — AMPICILLIN SODIUM AND SULBACTAM SODIUM 3 G: 2; 1 INJECTION, POWDER, FOR SOLUTION INTRAMUSCULAR; INTRAVENOUS at 17:05

## 2017-07-16 RX ADMIN — POLYETHYLENE GLYCOL 3350 17 G: 17 POWDER, FOR SOLUTION ORAL at 08:08

## 2017-07-16 RX ADMIN — CARVEDILOL 6.25 MG: 6.25 TABLET, FILM COATED ORAL at 08:08

## 2017-07-16 RX ADMIN — ISOSORBIDE DINITRATE 20 MG: 10 TABLET ORAL at 23:37

## 2017-07-16 RX ADMIN — WARFARIN SODIUM 0.5 MG: 1 TABLET ORAL at 17:04

## 2017-07-16 RX ADMIN — AMPICILLIN SODIUM AND SULBACTAM SODIUM 3 G: 2; 1 INJECTION, POWDER, FOR SOLUTION INTRAMUSCULAR; INTRAVENOUS at 00:50

## 2017-07-16 RX ADMIN — CARVEDILOL 6.25 MG: 6.25 TABLET, FILM COATED ORAL at 17:04

## 2017-07-16 RX ADMIN — ASPIRIN 81 MG 81 MG: 81 TABLET ORAL at 08:08

## 2017-07-17 ENCOUNTER — APPOINTMENT (INPATIENT)
Dept: ULTRASOUND IMAGING | Facility: HOSPITAL | Age: 82
DRG: 065 | End: 2017-07-17
Payer: MEDICARE

## 2017-07-17 ENCOUNTER — GENERIC CONVERSION - ENCOUNTER (OUTPATIENT)
Dept: OTHER | Facility: OTHER | Age: 82
End: 2017-07-17

## 2017-07-17 VITALS
DIASTOLIC BLOOD PRESSURE: 70 MMHG | HEIGHT: 70 IN | OXYGEN SATURATION: 98 % | WEIGHT: 175.93 LBS | SYSTOLIC BLOOD PRESSURE: 153 MMHG | TEMPERATURE: 98.5 F | RESPIRATION RATE: 18 BRPM | BODY MASS INDEX: 25.19 KG/M2 | HEART RATE: 59 BPM

## 2017-07-17 PROBLEM — R42 DIZZINESS: Chronic | Status: RESOLVED | Noted: 2017-07-13 | Resolved: 2017-07-17

## 2017-07-17 LAB
CHOLEST SERPL-MCNC: 141 MG/DL (ref 50–200)
EST. AVERAGE GLUCOSE BLD GHB EST-MCNC: 111 MG/DL
HBA1C MFR BLD: 5.5 % (ref 4.2–6.3)
HDLC SERPL-MCNC: 48 MG/DL (ref 40–60)
INR PPP: 2.67 (ref 0.86–1.16)
LDLC SERPL CALC-MCNC: 79 MG/DL (ref 0–100)
PROTHROMBIN TIME: 29.2 SECONDS (ref 12.1–14.4)
TRIGL SERPL-MCNC: 72 MG/DL

## 2017-07-17 PROCEDURE — 85610 PROTHROMBIN TIME: CPT | Performed by: PHYSICIAN ASSISTANT

## 2017-07-17 PROCEDURE — 93880 EXTRACRANIAL BILAT STUDY: CPT

## 2017-07-17 PROCEDURE — 83036 HEMOGLOBIN GLYCOSYLATED A1C: CPT | Performed by: PHYSICIAN ASSISTANT

## 2017-07-17 PROCEDURE — 80061 LIPID PANEL: CPT | Performed by: PHYSICIAN ASSISTANT

## 2017-07-17 RX ORDER — CEPHALEXIN 250 MG/1
250 CAPSULE ORAL EVERY 8 HOURS SCHEDULED
Qty: 9 CAPSULE | Refills: 0 | Status: SHIPPED | OUTPATIENT
Start: 2017-07-17 | End: 2017-07-20

## 2017-07-17 RX ORDER — ASPIRIN 81 MG/1
81 TABLET, CHEWABLE ORAL DAILY
Refills: 0
Start: 2017-07-17 | End: 2018-07-11

## 2017-07-17 RX ORDER — WARFARIN SODIUM 1 MG/1
0.5 TABLET ORAL
Refills: 0
Start: 2017-07-17 | End: 2018-07-11

## 2017-07-17 RX ORDER — ATORVASTATIN CALCIUM 40 MG/1
40 TABLET, FILM COATED ORAL EVERY EVENING
Qty: 30 TABLET | Refills: 0 | Status: SHIPPED | OUTPATIENT
Start: 2017-07-17 | End: 2018-07-11

## 2017-07-17 RX ORDER — AMLODIPINE BESYLATE 5 MG/1
5 TABLET ORAL DAILY
Qty: 30 TABLET | Refills: 0 | Status: ON HOLD | OUTPATIENT
Start: 2017-07-17 | End: 2018-08-26

## 2017-07-17 RX ORDER — AMLODIPINE BESYLATE 5 MG/1
5 TABLET ORAL DAILY
Status: DISCONTINUED | OUTPATIENT
Start: 2017-07-17 | End: 2017-07-17 | Stop reason: HOSPADM

## 2017-07-17 RX ADMIN — POLYETHYLENE GLYCOL 3350 17 G: 17 POWDER, FOR SOLUTION ORAL at 08:02

## 2017-07-17 RX ADMIN — CARVEDILOL 6.25 MG: 6.25 TABLET, FILM COATED ORAL at 07:59

## 2017-07-17 RX ADMIN — AMPICILLIN SODIUM AND SULBACTAM SODIUM 3 G: 2; 1 INJECTION, POWDER, FOR SOLUTION INTRAMUSCULAR; INTRAVENOUS at 01:08

## 2017-07-17 RX ADMIN — ASPIRIN 81 MG 81 MG: 81 TABLET ORAL at 08:00

## 2017-07-17 RX ADMIN — AMLODIPINE BESYLATE 5 MG: 5 TABLET ORAL at 12:00

## 2017-07-17 RX ADMIN — ISOSORBIDE DINITRATE 20 MG: 10 TABLET ORAL at 08:00

## 2017-07-18 LAB
BACTERIA BLD CULT: NORMAL
BACTERIA BLD CULT: NORMAL

## 2017-07-19 LAB
B BURGDOR IGG PATRN SER IB-IMP: POSITIVE
B BURGDOR IGM PATRN SER IB-IMP: NEGATIVE
B BURGDOR18KD IGG SER QL IB: PRESENT
B BURGDOR23KD IGG SER QL IB: PRESENT
B BURGDOR23KD IGM SER QL IB: ABNORMAL
B BURGDOR28KD IGG SER QL IB: PRESENT
B BURGDOR30KD IGG SER QL IB: PRESENT
B BURGDOR39KD IGG SER QL IB: PRESENT
B BURGDOR39KD IGM SER QL IB: ABNORMAL
B BURGDOR41KD IGG SER QL IB: PRESENT
B BURGDOR41KD IGM SER QL IB: ABNORMAL
B BURGDOR45KD IGG SER QL IB: PRESENT
B BURGDOR58KD IGG SER QL IB: PRESENT
B BURGDOR66KD IGG SER QL IB: PRESENT
B BURGDOR93KD IGG SER QL IB: ABNORMAL
LABORATORY COMMENT REPORT: NORMAL

## 2017-07-20 ENCOUNTER — GENERIC CONVERSION - ENCOUNTER (OUTPATIENT)
Dept: OTHER | Facility: OTHER | Age: 82
End: 2017-07-20

## 2017-12-20 ENCOUNTER — TRANSCRIBE ORDERS (OUTPATIENT)
Dept: ADMINISTRATIVE | Facility: HOSPITAL | Age: 82
End: 2017-12-20

## 2017-12-20 ENCOUNTER — APPOINTMENT (OUTPATIENT)
Dept: LAB | Facility: HOSPITAL | Age: 82
End: 2017-12-20
Attending: INTERNAL MEDICINE
Payer: MEDICARE

## 2017-12-20 DIAGNOSIS — N18.4 CHRONIC KIDNEY DISEASE, STAGE IV (SEVERE) (HCC): ICD-10-CM

## 2017-12-20 LAB
ANION GAP SERPL CALCULATED.3IONS-SCNC: 7 MMOL/L (ref 4–13)
BACTERIA UR QL AUTO: ABNORMAL /HPF
BILIRUB UR QL STRIP: NEGATIVE
BUN SERPL-MCNC: 36 MG/DL (ref 5–25)
CALCIUM SERPL-MCNC: 9.4 MG/DL (ref 8.3–10.1)
CHLORIDE SERPL-SCNC: 104 MMOL/L (ref 100–108)
CLARITY UR: ABNORMAL
CO2 SERPL-SCNC: 29 MMOL/L (ref 21–32)
COLOR UR: YELLOW
CREAT SERPL-MCNC: 2.39 MG/DL (ref 0.6–1.3)
CREAT UR-MCNC: 86.1 MG/DL
ERYTHROCYTE [DISTWIDTH] IN BLOOD BY AUTOMATED COUNT: 16.9 % (ref 11.6–15.1)
GFR SERPL CREATININE-BSD FRML MDRD: 23 ML/MIN/1.73SQ M
GLUCOSE SERPL-MCNC: 144 MG/DL (ref 65–140)
GLUCOSE UR STRIP-MCNC: NEGATIVE MG/DL
HCT VFR BLD AUTO: 37.9 % (ref 36.5–49.3)
HGB BLD-MCNC: 11.8 G/DL (ref 12–17)
HGB UR QL STRIP.AUTO: ABNORMAL
KETONES UR STRIP-MCNC: NEGATIVE MG/DL
LEUKOCYTE ESTERASE UR QL STRIP: ABNORMAL
MCH RBC QN AUTO: 26.4 PG (ref 26.8–34.3)
MCHC RBC AUTO-ENTMCNC: 31.1 G/DL (ref 31.4–37.4)
MCV RBC AUTO: 85 FL (ref 82–98)
NITRITE UR QL STRIP: NEGATIVE
NON-SQ EPI CELLS URNS QL MICRO: ABNORMAL /HPF
PH UR STRIP.AUTO: 6 [PH] (ref 4.5–8)
PHOSPHATE SERPL-MCNC: 2.9 MG/DL (ref 2.3–4.1)
PLATELET # BLD AUTO: 128 THOUSANDS/UL (ref 149–390)
PMV BLD AUTO: 10.5 FL (ref 8.9–12.7)
POTASSIUM SERPL-SCNC: 4.6 MMOL/L (ref 3.5–5.3)
PROT UR STRIP-MCNC: ABNORMAL MG/DL
PROT UR-MCNC: 167 MG/DL
PROT/CREAT UR: 1.94 MG/G{CREAT} (ref 0–0.1)
PTH-INTACT SERPL-MCNC: 93 PG/ML (ref 14–72)
RBC # BLD AUTO: 4.47 MILLION/UL (ref 3.88–5.62)
RBC #/AREA URNS AUTO: ABNORMAL /HPF
SODIUM SERPL-SCNC: 140 MMOL/L (ref 136–145)
SP GR UR STRIP.AUTO: 1.01 (ref 1–1.03)
UROBILINOGEN UR QL STRIP.AUTO: 0.2 E.U./DL
WBC # BLD AUTO: 4.6 THOUSAND/UL (ref 4.31–10.16)
WBC #/AREA URNS AUTO: ABNORMAL /HPF

## 2017-12-20 PROCEDURE — 36415 COLL VENOUS BLD VENIPUNCTURE: CPT

## 2017-12-20 PROCEDURE — 85027 COMPLETE CBC AUTOMATED: CPT

## 2017-12-20 PROCEDURE — 84156 ASSAY OF PROTEIN URINE: CPT

## 2017-12-20 PROCEDURE — 84100 ASSAY OF PHOSPHORUS: CPT

## 2017-12-20 PROCEDURE — 81001 URINALYSIS AUTO W/SCOPE: CPT

## 2017-12-20 PROCEDURE — 82570 ASSAY OF URINE CREATININE: CPT

## 2017-12-20 PROCEDURE — 83970 ASSAY OF PARATHORMONE: CPT

## 2017-12-20 PROCEDURE — 80048 BASIC METABOLIC PNL TOTAL CA: CPT

## 2018-01-09 NOTE — RESULT NOTES
Message   Please inform the patient that the blood count is stable, he is still anemic however, will need follow up with PCP and us in the next month or so  It can take upto few months for blood count to normalize but make sure he has follow up scheduled        Verified Results  (1) CBC/ PLT (NO DIFF) [id number] 11:20AM [facility]     Test Name Result Flag Reference   HEMATOCRIT 30 0 % L 36 5-49 3   HEMOGLOBIN 9 0 g/dL L 12 0-17 0   MCHC 30 0 g/dL L 31 4-37 4   MCH 24 1 pg L 26 8-34 3   MCV 80 fL L 82-98   PLATELET COUNT 468 Thousands/uL L 149-390   RBC COUNT 3 73 Million/uL L 3 88-5 62   RDW 18 2 % H 11 6-15 1   WBC COUNT 5 52 Thousand/uL  4 31-10 16   MPV 12 2 fL  8 9-12 7

## 2018-01-11 ENCOUNTER — GENERIC CONVERSION - ENCOUNTER (OUTPATIENT)
Dept: NEPHROLOGY | Facility: CLINIC | Age: 83
End: 2018-01-11

## 2018-01-12 NOTE — RESULT NOTES
Message   EGD only shows gastritis, no bacteria  no need for repeat unless alarm symptoms  Follow up in office with me or MARITZA Gregory in 1-2 months  Colon - 2 precancerous polyps- given advanced age- can hold off on repeat colonoscopy at this time unless alarm symptoms  Verified Results  COLONOSCOPY [id number] 02:01PM Dario López     Test Name Result Flag Reference   Colonoscopy 03/24/2017        Summary / No summary entered :      No summary entered   Documents attached :      EPIC OP NOTE - Dario López; [place]: [id number] - Transcription      Encounter - Dario López - (Gastroenterology Adult) (Additional      Information Document)  (1) TISSUE EXAM [id number] 01:12PM Dario López     Test Name Result Flag Reference   LAB AP CASE REPORT (Report)     Surgical Pathology Report             Case: [id number]                   Authorizing Provider: Fortino Prajapati MD    Collected:      03/24/2017 1312        Ordering Location:   [facility] Received:      03/24/2017 1524                    Operating Room                                 Pathologist:      Cecilio Albarado MD                                Specimens:  A) - Large Intestine, Transverse Colon, Transverse colon polyp                     B) - Large Intestine, Sigmoid Colon, Sigmoid colon polyp   LAB AP FINAL DIAGNOSIS (Report)     A  Large Intestine, Transverse Colon, Transverse colon polyp:  -Fragments of tubular adenoma  -No evidence of high grade dysplasia or malignancy seen  B  Large Intestine, Sigmoid Colon, Sigmoid colon polyp:  -Tubular adenoma  -No evidence of high grade dysplasia or malignancy seen  Electronically signed by Ceiclio Albarado MD on 3/28/2017 at 10:10 AM   LAB AP SURGICAL ADDITIONAL INFORMATION (Report)     These tests were developed and their performance characteristics   determined by [facility] or [address] [place] [address]  They may not be cleared or approved by the [place]   Food and Drug Administration  The FDA has determined that such clearance or   approval is not necessary  These tests are used for clinical purposes  They should not be regarded as investigational or for research  This   laboratory has been approved by [place], designated as a high-complexity   laboratory and is qualified to perform these tests  LAB AP GROSS DESCRIPTION (Report)     A  The specimen is received in formalin, labeled with the patient's name   and hospital number, and is designated transverse colon polyp  The   specimen consists of several, rubbery, tan-brown tissue fragments   measuring 1 0 x 0 8 x 0 15 cm in aggregate dimension  Entirely submitted  One cassette  B  The specimen is received in formalin, labeled with the patient's name   and hospital number, and is designated sigmoid colon polyp  The   specimen consists of a single, rubbery and friable, tan-brown tissue   fragment measuring up to 0 3 cm in greatest dimension  Entirely submitted  One cassette  Note: The estimated total formalin fixation time based upon information   provided by the submitting clinician and the standard processing schedule   is over 72 hours  SRS   LAB AP CLINICAL INFORMATION Cold bx     Cold bx     (1) TISSUE EXAM [id number] 02:04PM Severiano Guajardo     Test Name Result Flag Reference   LAB AP CASE REPORT (Report)     Surgical Pathology Report             Case: [id number]                   Authorizing Provider: Carmen Rees MD    Collected:      03/23/2017 1404        Ordering Location:   [facility] Received:      03/23/2017 [facility]                    Operating Room                                 Pathologist:      Adi Walter MD                             Specimen:  Stomach, Cold bx, r/o H pylori   LAB AP FINAL DIAGNOSIS (Report)     A  Stomach, biopsy:  - Chronic inactive oxyntic gastritis    - Negative for Helicobacter pylori, confirmed by immunohistochemical   stain, evaluated with appropriate positive controls  - Negative for atrophy, intestinal metaplasia, dysplasia or carcinoma  Electronically signed by Agatha James MD on 3/27/2017 at 1:51 PM   LAB AP SURGICAL ADDITIONAL INFORMATION (Report)     These tests were developed and their performance characteristics   determined by [facility] or [facility]  They may not be cleared or approved by the [place]  Food and   Drug Administration  The FDA has determined that such clearance or   approval is not necessary  These tests are used for clinical purposes  They should not be regarded as investigational or for research  This   laboratory has been approved by [place] [id number], designated as a high-complexity   laboratory and is qualified to perform these tests  Interpretation performed at [facility], [address] [place] [address]   LAB AP GROSS DESCRIPTION (Report)     A  The specimen is received in formalin, labeled with the patient's name   and hospital number, and is designated stomach biopsy  The specimen   consists of 3 tan-pink soft tissue fragments measuring 0 2 cm, 0 2 cm, and   0 3 cm in greatest dimension  Entirely submitted  One cassette  Note: The estimated total formalin fixation time based upon information   provided by the submitting clinician and the standard processing schedule   is 24 0 hours    [facility]   LAB AP CLINICAL INFORMATION      Cold bx r/o h  pylori   Cold bx r/o h  pylori

## 2018-01-14 VITALS
WEIGHT: 174 LBS | DIASTOLIC BLOOD PRESSURE: 70 MMHG | SYSTOLIC BLOOD PRESSURE: 120 MMHG | HEART RATE: 80 BPM | TEMPERATURE: 97.2 F | BODY MASS INDEX: 27.31 KG/M2 | HEIGHT: 67 IN | RESPIRATION RATE: 16 BRPM

## 2018-01-14 VITALS
RESPIRATION RATE: 16 BRPM | HEART RATE: 78 BPM | WEIGHT: 169 LBS | DIASTOLIC BLOOD PRESSURE: 70 MMHG | SYSTOLIC BLOOD PRESSURE: 120 MMHG | BODY MASS INDEX: 26.53 KG/M2 | HEIGHT: 67 IN | TEMPERATURE: 97.7 F

## 2018-01-14 VITALS
DIASTOLIC BLOOD PRESSURE: 76 MMHG | WEIGHT: 168 LBS | HEART RATE: 68 BPM | BODY MASS INDEX: 25.46 KG/M2 | SYSTOLIC BLOOD PRESSURE: 122 MMHG | HEIGHT: 68 IN

## 2018-01-15 NOTE — RESULT NOTES
Verified Results  (1) LYME ANTIBODY, WESTERN BLOT [id number] 06:07PM Cynthia Vasquez     Test Name Result Flag Reference   PLEASE NOTE Comment     The date recorded on the requisition indicates the sample(s) received  were greater than 67 hours old upon arrival in our laboratory    Performed at:  [address] [place] [address]  [id number]  : Jacques Osman MD, Phone:  [phone]       Plan  Lyme disease    · Doxycycline Hyclate 100 MG Oral Capsule; TAKE 1 CAPSULE EVERY 12 HOURS  UNTIL GONE

## 2018-02-28 ENCOUNTER — OFFICE VISIT (OUTPATIENT)
Dept: NEPHROLOGY | Facility: CLINIC | Age: 83
End: 2018-02-28
Payer: MEDICARE

## 2018-02-28 VITALS
BODY MASS INDEX: 22.05 KG/M2 | WEIGHT: 154 LBS | TEMPERATURE: 97.6 F | DIASTOLIC BLOOD PRESSURE: 70 MMHG | SYSTOLIC BLOOD PRESSURE: 120 MMHG | HEIGHT: 70 IN | HEART RATE: 68 BPM

## 2018-02-28 DIAGNOSIS — N18.4 CKD (CHRONIC KIDNEY DISEASE) STAGE 4, GFR 15-29 ML/MIN (HCC): Primary | ICD-10-CM

## 2018-02-28 DIAGNOSIS — I48.21 PERMANENT ATRIAL FIBRILLATION (HCC): Chronic | ICD-10-CM

## 2018-02-28 DIAGNOSIS — I50.42 CHRONIC COMBINED SYSTOLIC AND DIASTOLIC CONGESTIVE HEART FAILURE (HCC): ICD-10-CM

## 2018-02-28 DIAGNOSIS — I12.9 HYPERTENSIVE CKD (CHRONIC KIDNEY DISEASE): ICD-10-CM

## 2018-02-28 PROCEDURE — 99214 OFFICE O/P EST MOD 30 MIN: CPT | Performed by: INTERNAL MEDICINE

## 2018-02-28 RX ORDER — FUROSEMIDE 40 MG/1
40 TABLET ORAL DAILY PRN
Status: ON HOLD | COMMUNITY
End: 2018-08-26

## 2018-02-28 RX ORDER — ISOSORBIDE MONONITRATE 60 MG/1
60 TABLET, EXTENDED RELEASE ORAL DAILY
COMMUNITY
End: 2018-08-26 | Stop reason: HOSPADM

## 2018-02-28 NOTE — ASSESSMENT & PLAN NOTE
He is being closely monitored by cardiologist and overall his stable with present dose of diuretic which I will continue

## 2018-02-28 NOTE — LETTER
February 28, 2018     [facility] [address] [facility]    Patient: Dian Singletary   YOB: 1930   Date of Visit: 2/28/2018       Dear Dr Maged Gaston: Thank you for referring Dian Singletary to me for evaluation  Below are my notes for this consultation  If you have questions, please do not hesitate to call me  I look forward to following your patient along with you  Sincerely,        Miroslava Mock MD        CC: Aubrey Montesinos MD  2/28/2018  1:52 PM  Sign at close encounter  Lakeshia Gill 17[birth year] 80 y o  male MRN: [id number]    Encounter: [id number] 2/28/2018    REASON FOR VISIT: Dian Singletary is a 80 y o  male who is here on 2/28/2018 for No chief complaint on file  Ydkes Azam HPI:    Reggie Winn came in today for follow-up of stage IV CKD  He has hypertensive nephrosclerosis along with cardiorenal syndrome  He is overall doing well  Not much new complaint  He denies any shortness of breath  In between he was treated for UTI  REVIEW OF SYSTEMS:    Review of Systems   Constitutional: Negative for activity change and fatigue  HENT: Negative for congestion and ear discharge  Eyes: Negative for photophobia and pain  Respiratory: Positive for shortness of breath  Negative for apnea and choking  Cardiovascular: Negative for chest pain, palpitations and leg swelling  Gastrointestinal: Negative for abdominal distention and blood in stool  Endocrine: Negative for heat intolerance and polyphagia  Genitourinary: Negative for flank pain and urgency  Musculoskeletal: Negative for neck pain and neck stiffness  Skin: Negative for color change and wound  Allergic/Immunologic: Negative for food allergies and immunocompromised state  Neurological: Negative for seizures and facial asymmetry  Hematological: Negative for adenopathy  Does not bruise/bleed easily     Psychiatric/Behavioral: Negative for self-injury and suicidal ideas  PAST MEDICAL HISTORY:  Past Medical History:   Diagnosis Date    [facility]     Cancer ([facility]     bladder    [facility], [facility]    CHF (congestive heart failure) (HCC)     Chronic kidney disease     COPD (chronic obstructive pulmonary disease) (HCC)     Hepatitis C     Hepatitis C     Hypertension     TIA (transient ischemic attack)        PAST SURGICAL HISTORY:  Past Surgical History:   Procedure Laterality Date    CARDIAC SURGERY      CAROTID ARTERY ANGIOPLASTY      COLONOSCOPY N/A 3/24/2017    Procedure: COLONOSCOPY;  Surgeon: Angel Esparza MD;  [facility]; Service:     CYSTECTOMY      ESOPHAGOGASTRODUODENOSCOPY N/A 3/23/2017    Procedure: ESOPHAGOGASTRODUODENOSCOPY (EGD); Surgeon: Angel Esparza MD;  Location: [facility];   Service:     EYE SURGERY      ILEO CONDUIT         SOCIAL HISTORY:  History   Alcohol Use    Yes     Comment: RARELY      History   Drug Use No     History   Smoking Status    Former Smoker    Quit date: 2/28/1973   Smokeless Tobacco    Never Used       FAMILY HISTORY:  Family History   Problem Relation Age of Onset    Coronary artery disease Mother        MEDICATIONS:    Current Outpatient Prescriptions:     amLODIPine (NORVASC) 5 mg tablet, Take 1 tablet by mouth daily (Patient taking differently: Take 2 5 mg by mouth daily  ), Disp: 30 tablet, Rfl: 0    carvedilol (COREG) 6 25 mg tablet, Take 6 25 mg by mouth 2 (two) times a day with meals, Disp: , Rfl:     furosemide (LASIX) 40 mg tablet, Take 40 mg by mouth 2 (two) times a day, Disp: , Rfl:     isosorbide mononitrate (IMDUR) 60 mg 24 hr tablet, Take 60 mg by mouth daily, Disp: , Rfl:     warfarin (COUMADIN) 1 mg tablet, Take 0 5 tablets by mouth daily, Disp: , Rfl: 0    albuterol (PROVENTIL HFA,VENTOLIN HFA) 90 mcg/act inhaler, Inhale 2 puffs every 6 (six) hours as needed for wheezing, Disp: , Rfl:     ALPRAZolam (XANAX) 0 5 mg tablet, Take 0 5 tablets by mouth daily at bedtime as needed for anxiety or sleep for up to 7 days, Disp: 30 tablet, Rfl: 0    aspirin 81 mg chewable tablet, Chew 1 tablet daily, Disp: , Rfl: 0    atorvastatin (LIPITOR) 40 mg tablet, Take 1 tablet by mouth every evening, Disp: 30 tablet, Rfl: 0    PHYSICAL EXAM:  Vitals:    02/28/18 1315   BP: 120/70   BP Location: Right arm   Patient Position: Sitting   Pulse: 68   Temp: 97 6 °F (36 4 °C)   TempSrc: Oral   Weight: 69 9 kg (154 lb)   Height: 5' 10" (1 778 m)     Body mass index is 22 1 kg/m²  Physical Exam   Constitutional: He is oriented to person, place, and time  No distress  HENT:   Head: Normocephalic  Mouth/Throat: Oropharynx is clear and moist    Eyes: Conjunctivae are normal  Pupils are equal, round, and reactive to light  No scleral icterus  Neck: Neck supple  No JVD present  Cardiovascular: Normal rate and normal heart sounds  Pulmonary/Chest: Effort normal and breath sounds normal  He has no wheezes  Abdominal: Soft  There is no tenderness  Musculoskeletal: Normal range of motion  He exhibits no edema  Neurological: He is alert and oriented to person, place, and time  Skin: Skin is warm  No rash noted  Psychiatric: He has a normal mood and affect   His behavior is normal        LAB RESULTS:  Results for orders placed or performed in visit on 51/05/28   Basic metabolic panel   Result Value Ref Range    Sodium 140 136 - 145 mmol/L    Potassium 4 6 3 5 - 5 3 mmol/L    Chloride 104 100 - 108 mmol/L    CO2 29 21 - 32 mmol/L    Anion Gap 7 4 - 13 mmol/L    BUN 36 (H) 5 - 25 mg/dL    Creatinine 2 39 (H) 0 60 - 1 30 mg/dL    Glucose 144 (H) 65 - 140 mg/dL    Calcium 9 4 8 3 - 10 1 mg/dL    eGFR 23 ml/min/1 73sq m   CBC   Result Value Ref Range    WBC 4 60 4 31 - 10 16 Thousand/uL    RBC 4 47 3 88 - 5 62 Million/uL    Hemoglobin 11 8 (L) 12 0 - 17 0 g/dL    Hematocrit 37 9 36 5 - 49 3 %    MCV 85 82 - 98 fL    MCH 26 4 (L) 26 8 - 34 3 pg MCHC 31 1 (L) 31 4 - 37 4 g/dL    RDW 16 9 (H) 11 6 - 15 1 %    Platelets 685 (L) 646 - 390 Thousands/uL    MPV 10 5 8 9 - 12 7 fL   Phosphorus   Result Value Ref Range    Phosphorus 2 9 2 3 - 4 1 mg/dL   PTH, intact   Result Value Ref Range    PTH 93 0 (H) 14 0 - 72 0 pg/mL   Urinalysis with reflex to microscopic   Result Value Ref Range    Color, UA Yellow     Clarity, UA Cloudy     Specific [place], UA 1 015 1 003 - 1 030    pH, UA 6 0 4 5 - 8 0    Leukocytes, UA Large (A) Negative    Nitrite, UA Negative Negative    Protein,  (2+) (A) Negative mg/dl    Glucose, UA Negative Negative mg/dl    Ketones, UA Negative Negative mg/dl    Urobilinogen, UA 0 2 0 2, 1 0 E U /dl E U /dl    Bilirubin, UA Negative Negative    Blood, UA Small (A) Negative   Protein / creatinine ratio, urine   Result Value Ref Range    Creatinine, Ur 86 1 mg/dL    Protein Urine Random 167 mg/dL    Prot/Creat Ratio, Ur 1 94 (H) 0 00 - 0 10   Urine Microscopic   Result Value Ref Range    RBC, UA 4-10 (A) None Seen, 0-5 /hpf    WBC, UA Innumerable (A) None Seen, 0-5, 5-55, 5-65 /hpf    Epithelial Cells None Seen None Seen, Occasional /hpf    Bacteria, UA Innumerable (A) None Seen, Occasional /hpf       ASSESSMENT and PLAN:      CKD (chronic kidney disease) stage 4, GFR 15-29 ml/min ([facility])  His GFR still about 24 and stable for a while  Again like I mentioned before it is hypertensive nephrosclerosis along with cardiorenal cardiorenal syndrome  Treatment discussed at length with him and his wife  Possible need for dialysis was also discussed with him  He is not interested at this point  I will continue to monitor him  Advised to avoid any nephrotoxic medicine of procedure    Asymptomatic and progressive nature of kidney disease was also discussed with him    CHF (congestive heart failure) ([facility] )  He is being closely monitored by cardiologist and overall his stable with present dose of diuretic which I will continue    A-fib ([facility] )  Rate is well control  He is on beta-blocker as well as warfarin  Being monitored by cardiologist      I will see him back in 6 months as he does not like to see more often than that  Will get blood test before his next visit  Advised him to send me any other blood report which he get by any other doctor  Portions of the record may have been created with voice recognition software  Occasional wrong word or "sound a like" substitutions may have occurred due to the inherent limitations of voice recognition software  Read the chart carefully and recognize, using context, where substitutions have occurred  If you have any questions, please contact the dictating provider

## 2018-02-28 NOTE — PROGRESS NOTES
NEPHROLOGY OFFICE FOLLOW UP  Kp Hall 80 y o  male MRN: [id number]    Encounter: [id number] 2/28/2018    REASON FOR VISIT: Vaughn Marr is a 80 y o  male who is here on 2/28/2018 for No chief complaint on file  Lajean Cassette HPI:    Uvaldo Kidd came in today for follow-up of stage IV CKD  He has hypertensive nephrosclerosis along with cardiorenal syndrome  He is overall doing well  Not much new complaint  He denies any shortness of breath  In between he was treated for UTI  REVIEW OF SYSTEMS:    Review of Systems   Constitutional: Negative for activity change and fatigue  HENT: Negative for congestion and ear discharge  Eyes: Negative for photophobia and pain  Respiratory: Positive for shortness of breath  Negative for apnea and choking  Cardiovascular: Negative for chest pain, palpitations and leg swelling  Gastrointestinal: Negative for abdominal distention and blood in stool  Endocrine: Negative for heat intolerance and polyphagia  Genitourinary: Negative for flank pain and urgency  Musculoskeletal: Negative for neck pain and neck stiffness  Skin: Negative for color change and wound  Allergic/Immunologic: Negative for food allergies and immunocompromised state  Neurological: Negative for seizures and facial asymmetry  Hematological: Negative for adenopathy  Does not bruise/bleed easily  Psychiatric/Behavioral: Negative for self-injury and suicidal ideas           PAST MEDICAL HISTORY:  Past Medical History:   Diagnosis Date    [facility]     [facility]     bladder    [facility], [facility] 7/16/2017    CHF (congestive heart failure) (HCC)     Chronic kidney disease     COPD (chronic obstructive pulmonary disease) (HCC)     Hepatitis C     Hepatitis C     Hypertension     TIA (transient ischemic attack)        PAST SURGICAL HISTORY:  Past Surgical History:   Procedure Laterality Date    CARDIAC SURGERY      CAROTID ARTERY ANGIOPLASTY      COLONOSCOPY N/A 3/24/2017    Procedure: COLONOSCOPY;  Surgeon: Pretty Vasquez MD;  [facility]: [facility]; Service:     CYSTECTOMY      ESOPHAGOGASTRODUODENOSCOPY N/A 3/23/2017    Procedure: ESOPHAGOGASTRODUODENOSCOPY (EGD); Surgeon: Pretty Vasquez MD;  [facility];   Service:     EYE SURGERY      ILEO CONDUIT         SOCIAL HISTORY:  History   Alcohol Use    Yes     Comment: RARELY      History   Drug Use No     History   Smoking Status    Former Smoker    Quit date: 2/28/1973   Smokeless Tobacco    Never Used       FAMILY HISTORY:  Family History   Problem Relation Age of Onset    Coronary artery disease Mother        MEDICATIONS:    Current Outpatient Prescriptions:     amLODIPine (NORVASC) 5 mg tablet, Take 1 tablet by mouth daily (Patient taking differently: Take 2 5 mg by mouth daily  ), Disp: 30 tablet, Rfl: 0    carvedilol (COREG) 6 25 mg tablet, Take 6 25 mg by mouth 2 (two) times a day with meals, Disp: , Rfl:     furosemide (LASIX) 40 mg tablet, Take 40 mg by mouth 2 (two) times a day, Disp: , Rfl:     isosorbide mononitrate (IMDUR) 60 mg 24 hr tablet, Take 60 mg by mouth daily, Disp: , Rfl:     warfarin (COUMADIN) 1 mg tablet, Take 0 5 tablets by mouth daily, Disp: , Rfl: 0    albuterol (PROVENTIL HFA,VENTOLIN HFA) 90 mcg/act inhaler, Inhale 2 puffs every 6 (six) hours as needed for wheezing, Disp: , Rfl:     ALPRAZolam (XANAX) 0 5 mg tablet, Take 0 5 tablets by mouth daily at bedtime as needed for anxiety or sleep for up to 7 days, Disp: 30 tablet, Rfl: 0    aspirin 81 mg chewable tablet, Chew 1 tablet daily, Disp: , Rfl: 0    atorvastatin (LIPITOR) 40 mg tablet, Take 1 tablet by mouth every evening, Disp: 30 tablet, Rfl: 0    PHYSICAL EXAM:  Vitals:    02/28/18 1315   BP: 120/70   BP Location: Right arm   Patient Position: Sitting   Pulse: 68   Temp: 97 6 °F (36 4 °C)   TempSrc: Oral   Weight: 69 9 kg (154 lb)   Height: 5' 10" (1 778 m)     Body mass index is 22 1 kg/m²  Physical Exam   Constitutional: He is oriented to person, place, and time  No distress  HENT:   Head: Normocephalic  Mouth/Throat: Oropharynx is clear and moist    Eyes: Conjunctivae are normal  Pupils are equal, round, and reactive to light  No scleral icterus  Neck: Neck supple  No JVD present  Cardiovascular: Normal rate and normal heart sounds  Pulmonary/Chest: Effort normal and breath sounds normal  He has no wheezes  Abdominal: Soft  There is no tenderness  Musculoskeletal: Normal range of motion  He exhibits no edema  Neurological: He is alert and oriented to person, place, and time  Skin: Skin is warm  No rash noted  Psychiatric: He has a normal mood and affect   His behavior is normal        LAB RESULTS:  Results for orders placed or performed in visit on 18/52/32   Basic metabolic panel   Result Value Ref Range    Sodium 140 136 - 145 mmol/L    Potassium 4 6 3 5 - 5 3 mmol/L    Chloride 104 100 - 108 mmol/L    CO2 29 21 - 32 mmol/L    Anion Gap 7 4 - 13 mmol/L    BUN 36 (H) 5 - 25 mg/dL    Creatinine 2 39 (H) 0 60 - 1 30 mg/dL    Glucose 144 (H) 65 - 140 mg/dL    Calcium 9 4 8 3 - 10 1 mg/dL    eGFR 23 ml/min/1 73sq m   CBC   Result Value Ref Range    WBC 4 60 4 31 - 10 16 Thousand/uL    RBC 4 47 3 88 - 5 62 Million/uL    Hemoglobin 11 8 (L) 12 0 - 17 0 g/dL    Hematocrit 37 9 36 5 - 49 3 %    MCV 85 82 - 98 fL    MCH 26 4 (L) 26 8 - 34 3 pg    MCHC 31 1 (L) 31 4 - 37 4 g/dL    RDW 16 9 (H) 11 6 - 15 1 %    Platelets 622 (L) 963 - 390 Thousands/uL    MPV 10 5 8 9 - 12 7 fL   Phosphorus   Result Value Ref Range    Phosphorus 2 9 2 3 - 4 1 mg/dL   PTH, intact   Result Value Ref Range    PTH 93 0 (H) 14 0 - 72 0 pg/mL   Urinalysis with reflex to microscopic   Result Value Ref Range    Color, UA Yellow     Clarity, UA Cloudy     Specific [place], UA 1 015 1 003 - 1 030    pH, UA 6 0 4 5 - 8 0    Leukocytes, UA Large (A) Negative    Nitrite, UA Negative Negative    Protein,  (2+) (A) Negative mg/dl    Glucose, UA Negative Negative mg/dl    Ketones, UA Negative Negative mg/dl    Urobilinogen, UA 0 2 0 2, 1 0 E U /dl E U /dl    Bilirubin, UA Negative Negative    Blood, UA Small (A) Negative   Protein / creatinine ratio, urine   Result Value Ref Range    Creatinine, Ur 86 1 mg/dL    Protein Urine Random 167 mg/dL    Prot/Creat Ratio, Ur 1 94 (H) 0 00 - 0 10   Urine Microscopic   Result Value Ref Range    RBC, UA 4-10 (A) None Seen, 0-5 /hpf    WBC, UA Innumerable (A) None Seen, 0-5, 5-55, 5-65 /hpf    Epithelial Cells None Seen None Seen, Occasional /hpf    Bacteria, UA Innumerable (A) None Seen, Occasional /hpf       ASSESSMENT and PLAN:      CKD (chronic kidney disease) stage 4, GFR 15-29 ml/min (HCC)  His GFR still about 24 and stable for a while  Again like I mentioned before it is hypertensive nephrosclerosis along with cardiorenal cardiorenal syndrome  Treatment discussed at length with him and his wife  Possible need for dialysis was also discussed with him  He is not interested at this point  I will continue to monitor him  Advised to avoid any nephrotoxic medicine of procedure  Asymptomatic and progressive nature of kidney disease was also discussed with him    CHF (congestive heart failure) ([facility] )  He is being closely monitored by cardiologist and overall his stable with present dose of diuretic which I will continue    A-fib ([facility] )  Rate is well control  He is on beta-blocker as well as warfarin  Being monitored by cardiologist      I will see him back in 6 months as he does not like to see more often than that  Will get blood test before his next visit  Advised him to send me any other blood report which he get by any other doctor  Portions of the record may have been created with voice recognition software  Occasional wrong word or "sound a like" substitutions may have occurred due to the inherent limitations of voice recognition software   Read the chart carefully and recognize, using context, where substitutions have occurred  If you have any questions, please contact the dictating provider

## 2018-02-28 NOTE — PATIENT INSTRUCTIONS
Chronic Kidney Disease   AMBULATORY CARE:   Chronic kidney disease (CKD)  is the gradual and permanent loss of kidney function  Normally, the kidneys remove fluid, chemicals, and waste from your blood  These wastes are turned into urine by your kidneys  CKD may worsen over time and lead to kidney failure  Common symptoms include the following:   · Changes in how often you need to urinate    · Swelling in your arms, legs, or feet    · Shortness of breath    · Fatigue or weakness    · Bad or bitter taste in your mouth    · Nausea, vomiting, or loss of appetite  Seek care immediately if:   · You are confused and very drowsy  · You have a seizure  · You have shortness of breath  Contact your healthcare provider if:   · You suddenly gain or lose more weight than your healthcare provider has told you is okay  · You have itchy skin or a rash  · You urinate more or less than you normally do  · You have blood in your urine  · You have nausea and repeated vomiting  · You have fatigue or muscle weakness  · You have hiccups that will not stop  · You have questions or concerns about your condition or care  Treatment for CKD:  Medicines may be given to decrease blood pressure and get rid of extra fluid  You may also receive medicine to manage health conditions that may occur with CKD  Dialysis is a treatment to remove chemicals and waste from your blood when your kidneys can no longer do this  Surgery may be needed to create an arteriovenous fistula (AVF) in your arm or insert a catheter into your abdomen so that you can receive dialysis  A kidney transplant may be done if your CKD becomes severe  Manage CKD:   · Maintain a healthy weight  Ask your healthcare provider how much you should weigh  Ask him to help you create a weight loss plan if you are overweight  · Exercise 30 to 60 minutes a day, 4 to 7 times a week, or as directed  Ask about the best exercise plan for you   Regular exercise can help you manage CKD, high blood pressure, and diabetes  · Follow your healthcare provider's advice about what to eat and drink  He may tell you to eat food low in sodium (salt), potassium, phosphorus, or protein  You may need to see a dietitian if you need help planning meals  Ask how much liquid to drink each day and which liquids are best for you  · Limit alcohol  Ask how much alcohol is safe for you to drink  A drink of alcohol is 12 ounces of beer, 5 ounces of wine, or 1½ ounces of liquor  · Do not smoke  Nicotine and other chemicals in cigarettes and cigars can cause lung and kidney damage  Ask your healthcare provider for information if you currently smoke and need help to quit  E-cigarettes or smokeless tobacco still contain nicotine  Talk to your healthcare provider before you use these products  · Ask your healthcare provider if you need vaccines  Infections such as pneumonia, influenza, and hepatitis can be more harmful or more likely to occur in a person who has CKD  Vaccines reduce your risk of infection with these viruses  Follow up with your healthcare provider as directed:  Write down your questions so you remember to ask them during your visits  © 2017 [employer] Information is for End User's use only and may not be sold, redistributed or otherwise used for commercial purposes  All illustrations and images included in CareNotes® are the copyrighted property of [employer]  or Pankaj Sheppard  The above information is an  only  It is not intended as medical advice for individual conditions or treatments  Talk to your doctor, nurse or pharmacist before following any medical regimen to see if it is safe and effective for you

## 2018-02-28 NOTE — ASSESSMENT & PLAN NOTE
His GFR still about 24 and stable for a while  Again like I mentioned before it is hypertensive nephrosclerosis along with cardiorenal cardiorenal syndrome  Treatment discussed at length with him and his wife  Possible need for dialysis was also discussed with him  He is not interested at this point  I will continue to monitor him  Advised to avoid any nephrotoxic medicine of procedure    Asymptomatic and progressive nature of kidney disease was also discussed with him

## 2018-07-11 ENCOUNTER — APPOINTMENT (EMERGENCY)
Dept: RADIOLOGY | Facility: HOSPITAL | Age: 83
DRG: 813 | End: 2018-07-11
Payer: MEDICARE

## 2018-07-11 ENCOUNTER — APPOINTMENT (EMERGENCY)
Dept: CT IMAGING | Facility: HOSPITAL | Age: 83
DRG: 813 | End: 2018-07-11
Payer: MEDICARE

## 2018-07-11 ENCOUNTER — APPOINTMENT (INPATIENT)
Dept: ULTRASOUND IMAGING | Facility: HOSPITAL | Age: 83
DRG: 813 | End: 2018-07-11
Payer: MEDICARE

## 2018-07-11 ENCOUNTER — HOSPITAL ENCOUNTER (INPATIENT)
Facility: HOSPITAL | Age: 83
LOS: 1 days | Discharge: HOME | DRG: 813 | End: 2018-07-12
Attending: EMERGENCY MEDICINE | Admitting: INTERNAL MEDICINE
Payer: MEDICARE

## 2018-07-11 DIAGNOSIS — R31.9 HEMATURIA: Primary | ICD-10-CM

## 2018-07-11 DIAGNOSIS — I50.9 CHF (CONGESTIVE HEART FAILURE) (HCC): ICD-10-CM

## 2018-07-11 DIAGNOSIS — J90 PLEURAL EFFUSION ON LEFT: ICD-10-CM

## 2018-07-11 PROBLEM — I48.91 A-FIB (HCC): Chronic | Status: RESOLVED | Noted: 2017-02-19 | Resolved: 2018-07-11

## 2018-07-11 PROBLEM — I25.10 CAD (CORONARY ARTERY DISEASE): Chronic | Status: ACTIVE | Noted: 2018-07-11

## 2018-07-11 PROBLEM — Z86.73 HISTORY OF CVA (CEREBROVASCULAR ACCIDENT): Status: ACTIVE | Noted: 2018-07-11

## 2018-07-11 LAB
ABO GROUP BLD: NORMAL
ALBUMIN SERPL BCP-MCNC: 2.8 G/DL (ref 3.5–5)
ALP SERPL-CCNC: 87 U/L (ref 46–116)
ALT SERPL W P-5'-P-CCNC: 22 U/L (ref 12–78)
ANION GAP SERPL CALCULATED.3IONS-SCNC: 5 MMOL/L (ref 4–13)
AST SERPL W P-5'-P-CCNC: 22 U/L (ref 5–45)
ATRIAL RATE: 187 BPM
BACTERIA UR QL AUTO: ABNORMAL /HPF
BASOPHILS # BLD AUTO: 0.06 THOUSANDS/ΜL (ref 0–0.1)
BASOPHILS NFR BLD AUTO: 1 % (ref 0–1)
BILIRUB DIRECT SERPL-MCNC: 0.19 MG/DL (ref 0–0.2)
BILIRUB SERPL-MCNC: 0.6 MG/DL (ref 0.2–1)
BILIRUB UR QL STRIP: ABNORMAL
BLD GP AB SCN SERPL QL: POSITIVE
BLOOD GROUP ANTIBODIES SERPL: NORMAL
BUN SERPL-MCNC: 32 MG/DL (ref 5–25)
CALCIUM SERPL-MCNC: 8.4 MG/DL (ref 8.3–10.1)
CHLORIDE SERPL-SCNC: 104 MMOL/L (ref 100–108)
CLARITY UR: ABNORMAL
CO2 SERPL-SCNC: 28 MMOL/L (ref 21–32)
COLOR UR: ABNORMAL
CREAT SERPL-MCNC: 2.58 MG/DL (ref 0.6–1.3)
DAT POLY-SP REAG RBC QL: NEGATIVE
EOSINOPHIL # BLD AUTO: 0.35 THOUSAND/ΜL (ref 0–0.61)
EOSINOPHIL NFR BLD AUTO: 7 % (ref 0–6)
ERYTHROCYTE [DISTWIDTH] IN BLOOD BY AUTOMATED COUNT: 14.5 % (ref 11.6–15.1)
GFR SERPL CREATININE-BSD FRML MDRD: 21 ML/MIN/1.73SQ M
GLUCOSE SERPL-MCNC: 118 MG/DL (ref 65–140)
GLUCOSE UR STRIP-MCNC: NEGATIVE MG/DL
HCT VFR BLD AUTO: 36.1 % (ref 36.5–49.3)
HCT VFR BLD AUTO: 36.8 % (ref 36.5–49.3)
HGB BLD-MCNC: 11.3 G/DL (ref 12–17)
HGB BLD-MCNC: 11.7 G/DL (ref 12–17)
HGB UR QL STRIP.AUTO: ABNORMAL
IMM GRANULOCYTES # BLD AUTO: 0.02 THOUSAND/UL (ref 0–0.2)
IMM GRANULOCYTES NFR BLD AUTO: 0 % (ref 0–2)
INR PPP: 1.34 (ref 0.86–1.17)
KETONES UR STRIP-MCNC: ABNORMAL MG/DL
LEUKOCYTE ESTERASE UR QL STRIP: ABNORMAL
LYMPHOCYTES # BLD AUTO: 1.29 THOUSANDS/ΜL (ref 0.6–4.47)
LYMPHOCYTES NFR BLD AUTO: 24 % (ref 14–44)
MCH RBC QN AUTO: 27.2 PG (ref 26.8–34.3)
MCHC RBC AUTO-ENTMCNC: 31.3 G/DL (ref 31.4–37.4)
MCV RBC AUTO: 87 FL (ref 82–98)
MONOCYTES # BLD AUTO: 0.66 THOUSAND/ΜL (ref 0.17–1.22)
MONOCYTES NFR BLD AUTO: 12 % (ref 4–12)
NEUTROPHILS # BLD AUTO: 2.98 THOUSANDS/ΜL (ref 1.85–7.62)
NEUTS SEG NFR BLD AUTO: 56 % (ref 43–75)
NITRITE UR QL STRIP: POSITIVE
NON-SQ EPI CELLS URNS QL MICRO: ABNORMAL /HPF
NRBC BLD AUTO-RTO: 0 /100 WBCS
P AXIS: 75 DEGREES
PH UR STRIP.AUTO: 7 [PH] (ref 4.5–8)
PLATELET # BLD AUTO: 156 THOUSANDS/UL (ref 149–390)
PMV BLD AUTO: 10.3 FL (ref 8.9–12.7)
POTASSIUM SERPL-SCNC: 4 MMOL/L (ref 3.5–5.3)
PROT SERPL-MCNC: 7.9 G/DL (ref 6.4–8.2)
PROT UR STRIP-MCNC: >=300 MG/DL
PROTHROMBIN TIME: 16.4 SECONDS (ref 11.8–14.2)
QRS AXIS: 106 DEGREES
QRSD INTERVAL: 140 MS
QT INTERVAL: 454 MS
QTC INTERVAL: 497 MS
RBC # BLD AUTO: 4.15 MILLION/UL (ref 3.88–5.62)
RBC #/AREA URNS AUTO: ABNORMAL /HPF
RH BLD: NEGATIVE
SODIUM SERPL-SCNC: 137 MMOL/L (ref 136–145)
SP GR UR STRIP.AUTO: 1.02 (ref 1–1.03)
SPECIMEN EXPIRATION DATE: NORMAL
T WAVE AXIS: 5 DEGREES
UROBILINOGEN UR QL STRIP.AUTO: 2 E.U./DL
VENTRICULAR RATE: 72 BPM
WBC # BLD AUTO: 5.36 THOUSAND/UL (ref 4.31–10.16)
WBC #/AREA URNS AUTO: ABNORMAL /HPF

## 2018-07-11 PROCEDURE — 80076 HEPATIC FUNCTION PANEL: CPT | Performed by: EMERGENCY MEDICINE

## 2018-07-11 PROCEDURE — 36415 COLL VENOUS BLD VENIPUNCTURE: CPT | Performed by: EMERGENCY MEDICINE

## 2018-07-11 PROCEDURE — 86900 BLOOD TYPING SEROLOGIC ABO: CPT | Performed by: EMERGENCY MEDICINE

## 2018-07-11 PROCEDURE — 93005 ELECTROCARDIOGRAM TRACING: CPT

## 2018-07-11 PROCEDURE — 99285 EMERGENCY DEPT VISIT HI MDM: CPT

## 2018-07-11 PROCEDURE — 86870 RBC ANTIBODY IDENTIFICATION: CPT | Performed by: EMERGENCY MEDICINE

## 2018-07-11 PROCEDURE — 99222 1ST HOSP IP/OBS MODERATE 55: CPT | Performed by: INTERNAL MEDICINE

## 2018-07-11 PROCEDURE — 85014 HEMATOCRIT: CPT | Performed by: NURSE PRACTITIONER

## 2018-07-11 PROCEDURE — 76770 US EXAM ABDO BACK WALL COMP: CPT

## 2018-07-11 PROCEDURE — 87086 URINE CULTURE/COLONY COUNT: CPT | Performed by: EMERGENCY MEDICINE

## 2018-07-11 PROCEDURE — 86850 RBC ANTIBODY SCREEN: CPT | Performed by: EMERGENCY MEDICINE

## 2018-07-11 PROCEDURE — 81001 URINALYSIS AUTO W/SCOPE: CPT | Performed by: EMERGENCY MEDICINE

## 2018-07-11 PROCEDURE — 93010 ELECTROCARDIOGRAM REPORT: CPT | Performed by: INTERNAL MEDICINE

## 2018-07-11 PROCEDURE — 99221 1ST HOSP IP/OBS SF/LOW 40: CPT | Performed by: PHYSICIAN ASSISTANT

## 2018-07-11 PROCEDURE — 74176 CT ABD & PELVIS W/O CONTRAST: CPT

## 2018-07-11 PROCEDURE — 71046 X-RAY EXAM CHEST 2 VIEWS: CPT

## 2018-07-11 PROCEDURE — 85610 PROTHROMBIN TIME: CPT | Performed by: EMERGENCY MEDICINE

## 2018-07-11 PROCEDURE — 85025 COMPLETE CBC W/AUTO DIFF WBC: CPT | Performed by: EMERGENCY MEDICINE

## 2018-07-11 PROCEDURE — 86880 COOMBS TEST DIRECT: CPT | Performed by: EMERGENCY MEDICINE

## 2018-07-11 PROCEDURE — 99223 1ST HOSP IP/OBS HIGH 75: CPT | Performed by: FAMILY MEDICINE

## 2018-07-11 PROCEDURE — 86905 BLOOD TYPING RBC ANTIGENS: CPT

## 2018-07-11 PROCEDURE — 85018 HEMOGLOBIN: CPT | Performed by: NURSE PRACTITIONER

## 2018-07-11 PROCEDURE — 80048 BASIC METABOLIC PNL TOTAL CA: CPT | Performed by: EMERGENCY MEDICINE

## 2018-07-11 PROCEDURE — 86901 BLOOD TYPING SEROLOGIC RH(D): CPT | Performed by: EMERGENCY MEDICINE

## 2018-07-11 RX ORDER — ACETAMINOPHEN 325 MG/1
650 TABLET ORAL EVERY 6 HOURS PRN
Status: DISCONTINUED | OUTPATIENT
Start: 2018-07-11 | End: 2018-07-12 | Stop reason: HOSPADM

## 2018-07-11 RX ORDER — AMLODIPINE BESYLATE 2.5 MG/1
2.5 TABLET ORAL DAILY
Status: DISCONTINUED | OUTPATIENT
Start: 2018-07-11 | End: 2018-07-12 | Stop reason: HOSPADM

## 2018-07-11 RX ORDER — FUROSEMIDE 40 MG/1
40 TABLET ORAL DAILY
Status: DISCONTINUED | OUTPATIENT
Start: 2018-07-12 | End: 2018-07-12 | Stop reason: HOSPADM

## 2018-07-11 RX ORDER — ISOSORBIDE MONONITRATE 60 MG/1
60 TABLET, EXTENDED RELEASE ORAL
COMMUNITY
End: 2018-07-11

## 2018-07-11 RX ORDER — CARVEDILOL 6.25 MG/1
6.25 TABLET ORAL 2 TIMES DAILY WITH MEALS
Status: DISCONTINUED | OUTPATIENT
Start: 2018-07-11 | End: 2018-07-12 | Stop reason: HOSPADM

## 2018-07-11 RX ORDER — ISOSORBIDE MONONITRATE 60 MG/1
60 TABLET, EXTENDED RELEASE ORAL DAILY
Status: DISCONTINUED | OUTPATIENT
Start: 2018-07-11 | End: 2018-07-12 | Stop reason: HOSPADM

## 2018-07-11 RX ORDER — ONDANSETRON 2 MG/ML
4 INJECTION INTRAMUSCULAR; INTRAVENOUS EVERY 6 HOURS PRN
Status: DISCONTINUED | OUTPATIENT
Start: 2018-07-11 | End: 2018-07-12 | Stop reason: HOSPADM

## 2018-07-11 RX ORDER — FUROSEMIDE 10 MG/ML
40 INJECTION INTRAMUSCULAR; INTRAVENOUS ONCE
Status: COMPLETED | OUTPATIENT
Start: 2018-07-11 | End: 2018-07-11

## 2018-07-11 RX ADMIN — FUROSEMIDE 40 MG: 10 INJECTION, SOLUTION INTRAMUSCULAR; INTRAVENOUS at 16:51

## 2018-07-11 RX ADMIN — CARVEDILOL 6.25 MG: 6.25 TABLET, FILM COATED ORAL at 16:51

## 2018-07-11 NOTE — CONSULTS
Consultation - UROLOGY  [address] 80 y o  male MRN: [id number]  Unit/Bed#: -01 Encounter: [id number]      Assessment/Plan    hematuria via urostomy x 1 day ,H/O Bladder Ca with cystectomy Ileal conduit 19 yrs ago  pt was on renal dose Eliquis for A-Fib  and stopped 2 days ago for bleeding gums  Came in with magda blood form urostomy  Hbg is 11  -cont hold AC  -Retroperitoneal US is pending   -telemetry  -close monitoring of physical exam  -HBG q 6 hrs    SLIM management of:   CAD/ CABG   CKD, stage IV   CHF, large left pleural effusion, volume overload,   A-Fib, h/o TIA, and cerebellar CVA  HTN  HEP C         Physician Requesting Consult: Kermit Murphy MD    Chief Complaint   I started bleeding 1 day ago into urostomy bag  I had my bladder out @19 years ago for CA  HPI: Elroy Benton is a 80y o  year old male  consulted hematuria  Pt is s/p cystectomy @10 years ago for Bladder CA at [facility] and followed with them for years  His daughter said he was released by them years ago and has not picked up a Urologist locally  It is unknown how long ago he had a scope done  He has h/o UTI, ecoli, but otherwise he has ot had problems  He was on chronic coumadin for SHERYL/CVA, A-Fib for many years and switched to Eliquis 2 months ago because he could not remain in therapeutic range  2 days ago he stopped it due to bleeding gums and yesterday he developed dark bloody urine  He felt weak today and reported to the ED  CT shows renal cysts but he denies back pain  HBG 11, BP 97356, HR 70, RR 18      PMH:  Bladder CA, cystectomy urostomy 19 yrs ago, CHF, A-Fib, TIA, CVA, CAD CABGx 4, COPD, HTN,HDL, HEP C     ROS negative except for:  Weakness  Hematuria  Bleeding gums 2 days ago  Urostomy right LQ    Historical Information   Past Medical History:   Diagnosis Date    [facility]     Cancer [facility] [facility]     bladder    Cerebellar stroke, acute ([facility] [facility] ) 7/16/2017    CHF (congestive heart failure) (HCC)     Chronic kidney disease     COPD (chronic obstructive pulmonary disease) (HCC)     Hepatitis C     Hepatitis C     Hypertension     TIA (transient ischemic attack)      Past Surgical History:   Procedure Laterality Date    CARDIAC SURGERY      CAROTID ARTERY ANGIOPLASTY      COLONOSCOPY N/A 3/24/2017    Procedure: COLONOSCOPY;  Surgeon: Yanci Gaston MD;  Location: [facility]; Service:     CYSTECTOMY      ESOPHAGOGASTRODUODENOSCOPY N/A 3/23/2017    Procedure: ESOPHAGOGASTRODUODENOSCOPY (EGD); Surgeon: Yanci Gaston MD;  [facility]: [facility]; Service:     EYE SURGERY      ILEO CONDUIT       Social History   History   Alcohol Use    Yes     Comment: RARELY      History   Drug Use No     History   Smoking Status    Former Smoker    Quit date: 2/28/1973   Smokeless Tobacco    Never Used     Family History: no pertinent family history  No Known Allergies  Meds/Allergies     current meds:   Current Facility-Administered Medications   Medication Dose Route Frequency    acetaminophen (TYLENOL) tablet 650 mg  650 mg Oral Q6H PRN    amLODIPine (NORVASC) tablet 2 5 mg  2 5 mg Oral Daily    carvedilol (COREG) tablet 6 25 mg  6 25 mg Oral BID With Meals    furosemide (LASIX) injection 40 mg  40 mg Intravenous Once    [START ON 7/12/2018] furosemide (LASIX) tablet 40 mg  40 mg Oral Daily    isosorbide mononitrate (IMDUR) 24 hr tablet 60 mg  60 mg Oral Daily    ondansetron (ZOFRAN) injection 4 mg  4 mg Intravenous Q6H PRN         Objective   Vitals: Blood pressure 135/78, pulse 71, temperature 98 1 °F (36 7 °C), temperature source Oral, resp  rate 18, height 5' 8 5" (1 74 m), weight 69 9 kg (154 lb 1 6 oz), SpO2 98 %  ,Body mass index is 23 09 kg/m²    No intake or output data in the 24 hours ending 07/11/18 1535  Invasive Devices     Peripheral Intravenous Line            Peripheral IV 07/11/18 Right Antecubital less than 1 day          Drain            Urostomy Ileal conduit RLQ -- days                Physical Exam:    General appearance: A & O X 3  PT is pale  HEENT: PERRLA,EOMI, Sclera clear,anicterus,   Back: nontender   Lungs: Clear throughout  Heart: RRR, S1, S2 normal, systolic harsh  murmur,   Abdomen: soft, no flank tenderness, RLQ urostomy with dark bloody urine  No clots, 200 ml, and now set up to fitch drainage bag  Extremities: FROM, no joint deformities, pedal edema none   Skin: No rashes or lesions  Neurologic: CN II-XII intact, affect appropriate        Lab Results:    Coags:   Lab Results   Component Value Date    INR 1 34 (H) 07/11/2018   , CBC with diff:   Lab Results   Component Value Date    WBC 5 36 07/11/2018    HGB 11 3 (L) 07/11/2018    HCT 36 1 (L) 07/11/2018    MCV 87 07/11/2018     07/11/2018    MCH 27 2 07/11/2018    MCHC 31 3 (L) 07/11/2018    RDW 14 5 07/11/2018    MPV 10 3 07/11/2018    NRBC 0 07/11/2018   , BMP/CMP:   Lab Results   Component Value Date     07/11/2018    K 4 0 07/11/2018     07/11/2018    CO2 28 07/11/2018    ANIONGAP 5 07/11/2018    BUN 32 (H) 07/11/2018    CREATININE 2 58 (H) 07/11/2018    GLUCOSE 118 07/11/2018    CALCIUM 8 4 07/11/2018    AST 22 07/11/2018    ALT 22 07/11/2018    ALKPHOS 87 07/11/2018    PROT 7 9 07/11/2018    BILITOT 0 60 07/11/2018    EGFR 21 07/11/2018   , Hepatitis C Ab: No results found for: HEPCAB, Urinalysis:   Lab Results   Component Value Date    COLORU Red 07/11/2018    CLARITYU Turbid 07/11/2018    SPECGRAV 1 020 07/11/2018    PHUR 7 0 07/11/2018    LEUKOCYTESUR Moderate (A) 07/11/2018    NITRITE Positive (A) 07/11/2018    PROTEINUA >=300 (A) 07/11/2018    GLUCOSEU Negative 07/11/2018    KETONESU Trace (A) 07/11/2018    BILIRUBINUR Large (A) 07/11/2018    BLOODU Large (A) 07/11/2018     Imaging Studies: Ct Abdomen Pelvis Wo Contrast    Result Date: 7/11/2018  Impression: 1  Large left-sided pleural effusion  2   Bibasilar atelectasis  3   Cholelithiasis   4   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  5   Cystectomy and ileal loop  6  Penile implant and reservoir  Workstation performed: [id number]     Xr Chest Pa & Lateral    Result Date: 7/11/2018  Impression: Small to moderate-sized left pleural effusion Workstation performed: [id number]            Code Status: Level 1 - Full Code  Advance Directive and Living Will:      Power of :    POLST:      Counseling / Coordination of Care  Counseling/Coordination of Care: Total floor / unit time spent today 30 minutes  Greater than 50% of total time was spent with the patient and / or family counseling and / or coordination of care  A description of the counseling / coordination of care: plan to observe bloody urine output, and Hbg        Morteza Jarrett PA-C

## 2018-07-11 NOTE — CONSULTS
Consultation - Cardiology  [facility] [address] 80 y o  male MRN: [id number]  Unit/Bed#: -01 Encounter: [id number]    Inpatient consult to Cardiology  Consult performed by: Smiley Muro ordered by: Yuli Victor          Physician Requesting Consult: Rafita Elmore MD  Reason for Consult / Principal Problem: Hematuria, PAF    HPI: Cardiologist Dr Tiffany Schmidt is a 80y o  year old male who has a history of CAD s/p CABG x4 (2000 at [facility]), A Fib, CHF, HTN, COPD, hepatitis C, TIA/CVA, CKD4 presenting with hematuria  Patient is on Eliquis for PAF, was recently switched from Coumadin as he did not tolerate it  While on Eliquis, he developed bleeding from gums  Was told to stop Eliquis and had no other bleeding episodes until he developed gross hematuria yesterday  Despite this, bleeding worsened  Denies chest pain, SOB, lightheadedness, palpitations, leg swelling, syncope   Has not seen a urologist       REVIEW OF SYSTEMS:  Constitutional:  Denies fever or chills   Eyes:  Denies change in visual acuity   HENT:  Denies nasal congestion or sore throat   Respiratory:  Denies cough or shortness of breath   Cardiovascular:  Denies chest pain or edema   GI:  Denies abdominal pain, nausea, vomiting, bloody stools or diarrhea   :  +hematuria  Musculoskeletal:  Denies back pain or joint pain   Neurologic:  Denies headache, focal weakness or sensory changes   Endocrine:  Denies polyuria or polydipsia   Lymphatic:  Denies swollen glands   Psychiatric:  Denies depression or anxiety     Historical Information   Past Medical History:   Diagnosis Date    [facility] [address] [facility]     Cancer ([facility] [address] )     bladder    [facility], [facility]2017    CHF (congestive heart failure) (HCC)     Chronic kidney disease     COPD (chronic obstructive pulmonary disease) (HCC)     Hepatitis C     Hepatitis C     Hypertension     TIA (transient ischemic attack)      Past Surgical History:   Procedure Laterality Date    CARDIAC SURGERY      CAROTID ARTERY ANGIOPLASTY      COLONOSCOPY N/A 3/24/2017    Procedure: COLONOSCOPY;  Surgeon: Juan Montanez MD;  [facility]: [facility]; Service:     CYSTECTOMY      ESOPHAGOGASTRODUODENOSCOPY N/A 3/23/2017    Procedure: ESOPHAGOGASTRODUODENOSCOPY (EGD); Surgeon: Juan Montanez MD;  [facility]; Service:     EYE SURGERY      ILEO CONDUIT       History   Alcohol Use    Yes     Comment: RARELY      History   Drug Use No     History   Smoking Status    Former Smoker    Quit date: 2/28/1973   Smokeless Tobacco    Never Used     Family History:   Family History   Problem Relation Age of Onset    Coronary artery disease Mother        MEDS & ALLERGIES:  all current active meds have been reviewed and current meds:   Current Facility-Administered Medications   Medication Dose Route Frequency    acetaminophen (TYLENOL) tablet 650 mg  650 mg Oral Q6H PRN    amLODIPine (NORVASC) tablet 2 5 mg  2 5 mg Oral Daily    carvedilol (COREG) tablet 6 25 mg  6 25 mg Oral BID With Meals    furosemide (LASIX) injection 40 mg  40 mg Intravenous Once    [START ON 7/12/2018] furosemide (LASIX) tablet 40 mg  40 mg Oral Daily    isosorbide mononitrate (IMDUR) 24 hr tablet 60 mg  60 mg Oral Daily    ondansetron (ZOFRAN) injection 4 mg  4 mg Intravenous Q6H PRN        No Known Allergies    OBJECTIVE:  Vitals:   Vitals:    07/11/18 1348   BP: 135/78   Pulse: 71   Resp: 18   Temp:    SpO2: 98%     Body mass index is 23 09 kg/m²      Systolic (49IOL), EMU:351 , Min:125 , VCK:324     Diastolic (15GQH), WSC:33, Min:73, Max:78    No intake or output data in the 24 hours ending 07/11/18 1516  Weight (last 2 days)     Date/Time   Weight    07/11/18 1259  69 9 (154 1)            Invasive Devices     Peripheral Intravenous Line            Peripheral IV 07/11/18 Right Antecubital less than 1 day          Drain            Urostomy Ileal conduit RLQ -- days PHYSICAL EXAMS:  General:  Patient is not in acute distress, laying in the bed comfortably, awake, alert responding to commands  Head: Normocephalic, Atraumatic  HEENT:  Both pupils normal-size atraumatic, normocephalic, nonicteric  Neck:  JVP not raised  Trachea central  Respiratory:  Bronchovascular breathing all over the chest without any accompaniment  Cardiovascular:  S1-S2 normal without any murmur rails or rub  GI:  Abdomen soft nontender   Liver and spleen normal size  Musculoskeletal:  No edema  Integument:  No skin rashes or ulceration  Lymphatic:  No cervical or inguinal lymphadenopathy  Neurologic:  Patient is awake alert, responding to command, well-oriented to time and place and person    LABORATORY RESULTS:      CBC with diff:   Results from last 7 days  Lab Units 07/11/18  1246   WBC Thousand/uL 5 36   HEMOGLOBIN g/dL 11 3*   HEMATOCRIT % 36 1*   MCV fL 87   PLATELETS Thousands/uL 156   MCH pg 27 2   MCHC g/dL 31 3*   RDW % 14 5   MPV fL 10 3   NRBC AUTO /100 WBCs 0       CMP:  Results from last 7 days  Lab Units 07/11/18  1246   SODIUM mmol/L 137   POTASSIUM mmol/L 4 0   CHLORIDE mmol/L 104   CO2 mmol/L 28   ANION GAP mmol/L 5   BUN mg/dL 32*   CREATININE mg/dL 2 58*   GLUCOSE RANDOM mg/dL 118   CALCIUM mg/dL 8 4   AST U/L 22   ALT U/L 22   ALK PHOS U/L 87   TOTAL PROTEIN g/dL 7 9   BILIRUBIN TOTAL mg/dL 0 60   EGFR ml/min/1 73sq m 21       BMP:  Results from last 7 days  Lab Units 07/11/18  1246   SODIUM mmol/L 137   POTASSIUM mmol/L 4 0   CHLORIDE mmol/L 104   CO2 mmol/L 28   BUN mg/dL 32*   CREATININE mg/dL 2 58*   GLUCOSE RANDOM mg/dL 118   CALCIUM mg/dL 8 4                          Results from last 7 days  Lab Units 07/11/18  1246   INR  1 34*       Lipid Profile:   Lab Results   Component Value Date    CHOL 141 07/17/2017     Lab Results   Component Value Date    HDL 48 07/17/2017     Lab Results   Component Value Date    LDLCALC 79 07/17/2017     Lab Results   Component Value Date    TRIG 2017       Cardiac testing:   Results for orders placed during the hospital encounter of 17   Echo complete with contrast if indicated    Narrative [facility] [address] [address] [phone]    Transthoracic Echocardiogram  2D, M-mode, Doppler, and Color Doppler    Study date:  2017    Patient: Jeramie Caba  MR number: [id number]  Account number: [de-identified]  : 1930  Age: 80 years  Gender: Male  Status: Inpatient  Location: Bedside  Height: 68 in  Weight: 181 9 lb  BP: 153/ 78 mmHg    Indications: Heart failure  Diagnoses: I50 9 - Heart failure, unspecified    Sonographer:  Gupta RCS  Interpreting Physician:  Devan Rockwell MD  Referring Physician:  Rachel Bonner MD  Group:  [facility]    SUMMARY    LEFT VENTRICLE:  Ejection fraction was estimated to be 45 %  There was of the basal-mid inferolateral wall(s)  LEFT ATRIUM:  The atrium was markedly dilated  MITRAL VALVE:  There was moderate to severe regurgitation  AORTIC VALVE:  There was mild regurgitation  TRICUSPID VALVE:  There was trace regurgitation  IVC, HEPATIC VEINS:  The inferior vena cava was dilated  HISTORY: PRIOR HISTORY: A Fib  Congestive heart failure  Peripheral vascular disease  Risk factors: hypertension  PROCEDURE: The procedure was performed at the bedside  This was a routine study  The transthoracic approach was used  The study included complete 2D imaging, M-mode, complete spectral Doppler, and color Doppler  The heart rate was 71 bpm,  at the start of the study  Images were obtained from the parasternal, apical, subcostal, and suprasternal notch acoustic windows  Image quality was adequate  LEFT VENTRICLE: Ejection fraction was estimated to be 45 %  There was of the basal-mid inferolateral wall(s)      RIGHT VENTRICLE: The size was normal  Systolic function was normal  Wall thickness was normal     LEFT ATRIUM: The atrium was markedly dilated  RIGHT ATRIUM: Size was normal     MITRAL VALVE: DOPPLER: There was moderate to severe regurgitation  AORTIC VALVE: Leaflets exhibited mild calcification  DOPPLER: There was mild regurgitation  TRICUSPID VALVE: DOPPLER: There was trace regurgitation  Estimated peak PA pressure was 35 mmHg  PULMONIC VALVE: Leaflets exhibited normal thickness, no calcification, and normal cuspal separation  DOPPLER: The transpulmonic velocity was within the normal range  There was no regurgitation  PERICARDIUM: There was no pericardial effusion  The pericardium was normal in appearance  AORTA: The root exhibited normal size  SYSTEMIC VEINS: IVC: The inferior vena cava was dilated  SYSTEM MEASUREMENT TABLES    2D  Ao Diam: 2 8 cm  EF Biplane: 55 1 %  LA Area: 21 7 cm2  LA Diam: 4 8 cm  LVEDV MOD A2C: 157 ml  LVEDV MOD A4C: 140 9 ml  LVEDV MOD BP: 150 9 ml  LVEF MOD A2C: 42 3 %  LVEF MOD A4C: 67 2 %  LVESV MOD A2C: 90 6 ml  LVESV MOD A4C: 46 2 ml  LVESV MOD BP: 67 7 ml  LVLd A2C: 9 2 cm  LVLd A4C: 8 8 cm  LVLs A2C: 8 2 cm  LVLs A4C: 7 2 cm  RA Area: 14 8 cm2  RVIDd: 3 7 cm  SV MOD A2C: 66 4 ml  SV MOD A4C: 94 8 ml    CW  AV Env  Ti: 319 7 ms  AV VTI: 28 cm  AV Vmax: 1 4 m/s  AV Vmean: 0 9 m/s  AV maxP 7 mmHg  AV meanPG: 3 7 mmHg  TR Vmax: 2 9 m/s  TR maxP 2 mmHg    MM  TAPSE: 1 8 cm    PW  E': 0 1 m/s  E/E': 15 9  MV A Ishan: 0 4 m/s  MV Dec [place]: 5 6 m/s2  MV DecT: 187 4 ms  MV E Ishan: 1 m/s  MV E/A Ratio: 2 6  MV PHT: 54 4 ms  MVA By PHT: 4 cm2    [employer] Accredited Echocardiography Laboratory    Prepared and electronically signed by    Autumn Bauman MD  Signed [id number] 13:45:57         Imaging: I have personally reviewed pertinent reports  EKG reviewed personally:   Unavailable to me at this time    Assessment/Plan:  1  Paroxysmal A Fib:   -Continue to hold anticoagulation given gross hematuria   Will discuss restarting anticoagulation when hematuria resolves  -Continue Coreg for rate control  -INR 1 34    -given gross hematuria, will consult Urology for further management  2  Chronic systolic CHF, ischemic cardiomyopathy:  -Not in exacerbation at this time  -ECHO Feb 2017 shows EF 45%, moderate to severe MR  -continue Lasix 40 mg p o  daily, Coreg 6 25 mg p o  B i d   -no ACEI/ARB given elevated creatinine  -low-salt diet, daily weights    3  Mitral Regurgitation: Moderate to severe per ECHO last year  Will need repeat ECHO at some point, but will hold off for now  4  CAD s/p CABG x4: No anginal symptoms  Antiplatelet agents held given hematuria  Continue Coreg, Imdur  Intolerant to statins  5  Hx CVA/TIA: Recent stroke in March  No ASA as above  Intolerant to statins  6  HTN:  Stable, continue present regimen  7  HLD:  Intolerant to statins  Code Status: Level 1 - Full Code    Thank you for allowing us to participate in this patient's care  This pt will follow up with Dr Christina Ewing once discharged  Counseling / Coordination of Care  Total floor / unit time spent today 35 minutes  Greater than 50% of total time was spent with the patient and / or family counseling and / or coordination of care  A description of the counseling / coordination of care: Review of history, current assessment, development of a plan  Ministerio Smith PA-C  7/11/2018,3:16 PM    Portions of the record may have been created with voice recognition software   Occasional wrong word or "sound a like" substitutions may have occurred due to the inherent limitations of voice recognition software   Read the chart carefully and recognize, using context, where substitutions have occurred

## 2018-07-11 NOTE — ASSESSMENT & PLAN NOTE
· Patient admitted with hematuria from urostomy bag which began yesterday  He is on Eliquis as outpatient which he has not taken for 2 days  He was transitioned from Coumadin appx 2-3 months ago 2/2 "complications"  · Patient with history of bladder ca s/p bladder removal 19 years ago  · Blood is dark, no clots  Continue to hold all anticoagulation  No IV hydration 2/2 history of systolic CHF and patient volume overloaded at this time  · Consult cardiology and urology  I have discussed patient with urology, may require scope  · CT C/A/P negative for any retroperitoneal bleed  Will check retroperitoneal US STAT     · Hemoglobin stable now, monitor serial H&H

## 2018-07-11 NOTE — ASSESSMENT & PLAN NOTE
· S/P CABG x 4 in past    · Not on ASA likely 2/2 bleeding risk  Continue BB  Intolerant of statins

## 2018-07-11 NOTE — ED PROVIDER NOTES
History  Chief Complaint   Patient presents with    Bleeding/Bruising     pt states he was put on eliquis recently and is now c/o blood in his urine, and gum bleeding  HPI patient is an 51-year-old male, recently started on Eliquis they believe secondary to his heart failure  Patient also has history of atrial fibrillation  Patient reports that recently his gums started to bleed and then last night he noticed blood in his urine  Patient reports his urostomy bag is full of blood currently  Patient denies any lightheadedness  There is no shortness of breath he denies any chest pain  There is no loss of consciousness  He reports just not feeling well the last few days but no specific pain  He denies any vomiting  Past medical history CVA CHF, renal insufficiency,  Family history noncontributory  Social history, here with his daughters, former smoker    Prior to Admission Medications   Prescriptions Last Dose Informant Patient Reported? Taking?    albuterol (PROVENTIL HFA,VENTOLIN HFA) 90 mcg/act inhaler  Self Yes No   Sig: Inhale 2 puffs every 6 (six) hours as needed for wheezing   amLODIPine (NORVASC) 5 mg tablet   No No   Sig: Take 1 tablet by mouth daily   Patient taking differently: Take 2 5 mg by mouth daily     apixaban (ELIQUIS) 2 5 mg   Yes Yes   Sig: Take 2 5 mg by mouth 2 (two) times a day   aspirin 81 mg chewable tablet   No No   Sig: Chew 1 tablet daily   atorvastatin (LIPITOR) 40 mg tablet   No No   Sig: Take 1 tablet by mouth every evening   carvedilol (COREG) 6 25 mg tablet  Self Yes No   Sig: Take 6 25 mg by mouth 2 (two) times a day with meals   furosemide (LASIX) 40 mg tablet   Yes No   Sig: Take 40 mg by mouth 2 (two) times a day   isosorbide mononitrate (IMDUR) 60 mg 24 hr tablet   Yes No   Sig: Take 60 mg by mouth daily      Facility-Administered Medications: None       Past Medical History:   Diagnosis Date    Atrial fibrillation (HCC)     Cancer (HCC)     bladder    Cerebellar stroke, acute ([facility] [address] ) 7/16/2017    CHF (congestive heart failure) (HCC)     Chronic kidney disease     COPD (chronic obstructive pulmonary disease) (HCC)     Hepatitis C     Hepatitis C     Hypertension     TIA (transient ischemic attack)        Past Surgical History:   Procedure Laterality Date    CARDIAC SURGERY      CAROTID ARTERY ANGIOPLASTY      COLONOSCOPY N/A 3/24/2017    Procedure: COLONOSCOPY;  Surgeon: Devonte Benton MD;  Location: [facility]; Service:     CYSTECTOMY      ESOPHAGOGASTRODUODENOSCOPY N/A 3/23/2017    Procedure: ESOPHAGOGASTRODUODENOSCOPY (EGD); Surgeon: Devonte Benton MD;  Location: [facility]; Service:     EYE SURGERY      ILEO CONDUIT         Family History   Problem Relation Age of Onset    Coronary artery disease Mother      I have reviewed and agree with the history as documented  Social History   Substance Use Topics    Smoking status: Former Smoker     Quit date: 2/28/1973    Smokeless tobacco: Never Used    Alcohol use Yes      Comment: RARELY         Review of Systems   Constitutional: Negative for diaphoresis, fatigue and fever  HENT: Negative for congestion, ear pain, nosebleeds and sore throat  Eyes: Negative for photophobia, pain, discharge and visual disturbance  Respiratory: Negative for cough, choking, chest tightness, shortness of breath and wheezing  Cardiovascular: Negative for chest pain and palpitations  Gastrointestinal: Negative for abdominal distention, abdominal pain, diarrhea and vomiting  Genitourinary: Positive for hematuria  Negative for dysuria, flank pain and frequency  Musculoskeletal: Negative for back pain, gait problem and joint swelling  Skin: Negative for color change and rash  Neurological: Negative for dizziness, syncope and headaches  Psychiatric/Behavioral: Negative for behavioral problems and confusion  The patient is not nervous/anxious  All other systems reviewed and are negative        Physical Exam  Physical Exam   Constitutional: He is oriented to person, place, and time  He appears well-developed and well-nourished  HENT:   Head: Normocephalic  Right Ear: External ear normal    Left Ear: External ear normal    Nose: Nose normal    Mouth/Throat: Oropharynx is clear and moist    Eyes: EOM and lids are normal  Pupils are equal, round, and reactive to light  Neck: Normal range of motion  Neck supple  Cardiovascular: Normal rate, regular rhythm, normal heart sounds and intact distal pulses  Pulmonary/Chest: Effort normal and breath sounds normal  No respiratory distress  Abdominal: Soft  Bowel sounds are normal  He exhibits no distension and no mass  There is no tenderness  There is no rebound and no guarding  No hernia  Musculoskeletal: Normal range of motion  He exhibits no deformity  Neurological: He is alert and oriented to person, place, and time  Skin: Skin is warm and dry  Psychiatric: He has a normal mood and affect  Nursing note and vitals reviewed     Repaired right inguinal hernia with mesh, minimal tenderness there  Pulse oximetry 98% on room air adequate oxygenation, there is no hypoxia  Patient's urostomy bag is filled with blood comma test heme-positive, control positive    Vital Signs  ED Triage Vitals   Temperature Pulse Respirations Blood Pressure SpO2   07/11/18 1218 07/11/18 1219 07/11/18 1219 07/11/18 1219 07/11/18 1219   98 1 °F (36 7 °C) 92 19 125/73 98 %      Temp Source Heart Rate Source Patient Position - Orthostatic VS BP Location FiO2 (%)   07/11/18 1218 07/11/18 1219 07/11/18 1219 07/11/18 1219 --   Oral Monitor Lying Right arm       Pain Score       07/11/18 1219       No Pain           Vitals:    07/11/18 1219 07/11/18 1348   BP: 125/73 135/78   Pulse: 92 71   Patient Position - Orthostatic VS: Lying Lying       Visual Acuity      ED Medications  Medications - No data to display    Diagnostic Studies  Results Reviewed     Procedure Component Value Units Date/Time    Hepatic function panel [[id number]]  (Abnormal) Collected:  07/11/18 1246    Lab Status:  Final result Specimen:  Blood from Arm, Right Updated:  07/11/18 1312     Total Bilirubin 0 60 mg/dL      Bilirubin, Direct 0 19 mg/dL      Alkaline Phosphatase 87 U/L      AST 22 U/L      ALT 22 U/L      Total Protein 7 9 g/dL      Albumin 2 8 (L) g/dL     Basic metabolic panel [[id number]]  (Abnormal) Collected:  07/11/18 1246    Lab Status:  Final result Specimen:  Blood from Arm, Right Updated:  07/11/18 1310     Sodium 137 mmol/L      Potassium 4 0 mmol/L      Chloride 104 mmol/L      CO2 28 mmol/L      Anion Gap 5 mmol/L      BUN 32 (H) mg/dL      Creatinine 2 58 (H) mg/dL      Glucose 118 mg/dL      Calcium 8 4 mg/dL      eGFR 21 ml/min/1 73sq m     Narrative:         National Kidney Disease Education Program recommendations are as follows:  GFR calculation is accurate only with a steady state creatinine  Chronic Kidney disease less than 60 ml/min/1 73 sq  meters  Kidney failure less than 15 ml/min/1 73 sq  meters  Urine Microscopic [[id number]]  (Abnormal) Collected:  07/11/18 1249    Lab Status:  Final result Specimen:  Urine from Urine, Other Updated:  07/11/18 1308     RBC, UA Innumerable (A) /hpf      WBC, UA 10-20 (A) /hpf      Epithelial Cells None Seen /hpf      Bacteria, UA Occasional /hpf     Narrative:       Dark color of urine may falsely effect results of multistrip/LD    Urine culture [[id number]] Collected:  07/11/18 1249    Lab Status:   In process Specimen:  Urine from Urine, Other Updated:  07/11/18 1308    Protime-INR [[id number]]  (Abnormal) Collected:  07/11/18 1246    Lab Status:  Final result Specimen:  Blood from Arm, Right Updated:  07/11/18 1306     Protime 16 4 (H) seconds      INR 1 34 (H)    UA w Reflex to Microscopic w Reflex to Culture [[id number]]  (Abnormal) Collected:  07/11/18 1249    Lab Status:  Final result Specimen:  Urine from Urine, Other Updated:  07/11/18 1304     Color, UA Red     Clarity, UA Turbid     Specific [place], UA 1 020     pH, UA 7 0     Leukocytes, UA Moderate (A)     Nitrite, UA Positive (A)     Protein, UA >=300 (A) mg/dl      Glucose, UA Negative mg/dl      Ketones, UA Trace (A) mg/dl      Urobilinogen, UA 2 0 (A) E U /dl      Bilirubin, UA Large (A)     Blood, UA Large (A)    CBC and differential [[id number]]  (Abnormal) Collected:  07/11/18 1246    Lab Status:  Final result Specimen:  Blood from Arm, Right Updated:  07/11/18 1256     WBC 5 36 Thousand/uL      RBC 4 15 Million/uL      Hemoglobin 11 3 (L) g/dL      Hematocrit 36 1 (L) %      MCV 87 fL      MCH 27 2 pg      MCHC 31 3 (L) g/dL      RDW 14 5 %      MPV 10 3 fL      Platelets 764 Thousands/uL      nRBC 0 /100 WBCs      Neutrophils Relative 56 %      Immat GRANS % 0 %      Lymphocytes Relative 24 %      Monocytes Relative 12 %      Eosinophils Relative 7 (H) %      Basophils Relative 1 %      Neutrophils Absolute 2 98 Thousands/µL      Immature Grans Absolute 0 02 Thousand/uL      Lymphocytes Absolute 1 29 Thousands/µL      Monocytes Absolute 0 66 Thousand/µL      Eosinophils Absolute 0 35 Thousand/µL      Basophils Absolute 0 06 Thousands/µL                  CT abdomen pelvis wo contrast   Final Result by Ede Ray MD (07/11 1343)         1  Large left-sided pleural effusion  2   Bibasilar atelectasis  3   Cholelithiasis  4   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  5   Cystectomy and ileal loop  6  Penile implant and reservoir                 Workstation performed: [id number]         XR chest pa & lateral    (Results Pending)              Procedures  ECG 12 Lead Documentation  Date/Time: 7/11/2018 12:46 PM  Performed by: Jacque Tejada  Authorized by: Jacque Tejada     Indications / Diagnosis:  Hematuria  ECG reviewed by me, the ED Provider: yes    Patient location:  ED  Previous ECG:     Previous ECG:  Compared to current    Comparison ECG info:  July 13, 2017    Similarity:  Changes noted  Interpretation:     Interpretation: non-specific    Rate:     ECG rate:  Seventy-two    ECG rate assessment: normal    Rhythm:     Rhythm: atrial fibrillation    Ectopy:     Ectopy: none    QRS:     QRS axis:  Normal  Conduction:     Conduction: abnormal      Abnormal conduction: complete RBBB    ST segments:     ST segments:  Non-specific  Comments:      Atrial fib versus atrial flutter, right bundle, prior EKG sinus rhythm           Phone Contacts  ED Phone Contact    ED Course      diagnostic testing showed normal liver functions, patient's electrolytes showed a BUN of 32 creatinine of 2 5 consistent with renal insufficiency, urinalysis showed innumerable red cells consistent with the patient's hematuria  patient's INR was 1 34     white count was 5 36, no sign of inflammation, hemoglobin was 11 3 similar to a prior hemoglobins  Chest x-ray showed a left pleural effusion, this is new from the patient's prior chest x-ray, no definite pneumonia, no cardiomegaly, interpreted by me I was initial   CT scan abdomen pelvis showed a left pleural effusion, there was cholelithiasis, there were multiple renal cyst but no renal stones  There was a ileal loop  Penile implant  MDM medical decision making 80-year-old male on Eliquis now with gross hematuria, discussed with patient advised admission, may require transfusion, may require reversal, patient also has a new left pleural effusion will require evaluation  Discussed with the hospitalist agreed to admission  Discussed with the family    CritCare Time    Disposition  Final diagnoses:   Hematuria - On anticoagulants   Pleural effusion on left     Time reflects when diagnosis was documented in both MDM as applicable and the Disposition within this note     Time User Action Codes Description Comment    7/11/2018  1:50 PM Ning Thompson Add [R31 9] Hematuria     7/11/2018  1:51 PM Gabriele Garcia [R31 9] Hematuria On anticoagulants    7/11/2018  1:51 PM Terri Sic Add [J90] Pleural effusion on left       ED Disposition     ED Disposition Condition Comment    Admit  Case was discussed with the hospitalist service and the patient's admission status was agreed to be 2 midnights the service of Dr Bills Has    None         Patient's Medications   Discharge Prescriptions    No medications on file     No discharge procedures on file      ED Provider  Electronically Signed by           Shaila Barnhart MD  07/11/18 2579

## 2018-07-11 NOTE — H&P
[address] [facility]    H&P- Kp Hall 6/21/1930, 80 y o  male MRN: [id number]    Unit/Bed#: ED 13 Encounter: [id number]    Primary Care Provider: Jocelyn De Luna MD   Date and time admitted to hospital: 7/11/2018 12:16 PM    * Hematuria   Assessment & Plan    · Patient admitted with hematuria from urostomy bag which began yesterday  He is on Eliquis as outpatient which he has not taken for 2 days  He was transitioned from Coumadin appx 2-3 months ago 2/2 "complications"  · Patient with history of bladder ca s/p bladder removal 19 years ago  · Blood is dark, no clots  Continue to hold all anticoagulation  No IV hydration 2/2 history of systolic CHF and patient volume overloaded at this time  · Consult cardiology and urology  I have discussed patient with urology, may require scope  · CT C/A/P negative for any retroperitoneal bleed  Will check retroperitoneal US STAT  · Hemoglobin stable now, monitor serial H&H        CHF (congestive heart failure) (HCC)   Assessment & Plan    · Volume overloaded  Large left pleural effusion on CT, +JVD, crackles in left lung base  · Will give one time dose of IV lasix and continue daily oral lasix pending cardiology evaluation  Patient only takes lasix PRN at home  · Continue BB  Not on ACEI maybe 2/2 CKD  · Monitor I/O, daily weights, low sodium diet  Atrial fibrillation (HCC)   Assessment & Plan    · HOLD Eliquis  Currently in NSR  · Continue BB  CKD (chronic kidney disease) stage 4, GFR 15-29 ml/min ([facility])   Assessment & Plan    · Creatinine 2 58, at relative baseline  Follows with Dr Lena Payne as outpatient  · Monitor renal function closely  HTN (hypertension)   Assessment & Plan    · BP is acceptable  Continue Coreg  · Monitor         CAD (coronary artery disease)   Assessment & Plan    · S/P CABG x 4 in past    · Not on ASA likely 2/2 bleeding risk  Continue BB  Intolerant of statins           History of CVA (cerebrovascular accident)   Assessment & Plan    · Recent stroke in March, no neurological deficits  VTE Prophylaxis: Pharmacologic VTE Prophylaxis contraindicated due to hematuria  / sequential compression device   Code Status: FULL CODE   POLST: POLST form is not discussed and not completed at this time  Anticipated Length of Stay:  Patient will be admitted on an Inpatient basis with an anticipated length of stay of  Greater than 2 midnights  Justification for Hospital Stay: close monitoring of hemodynamics, cardiology and urology evaluation  Total Time for Visit, including Counseling / Coordination of Care: 45 minutes  Greater than 50% of this total time spent on direct patient counseling and coordination of care  Chief Complaint:   Jackie Alejo blood in my urine  History of Present Illness:    Bubba Kovacs is a 80 y o  male with a past medical history of paroxysmal atrial fibrillation on Eliquis, bladder cancer status post bladder removal with urostomy approximately 19 years ago, hypertension, hyperlipidemia, CAD status post CABG x4, stroke, systolic congestive heart failure who presents with hematuria which began yesterday  Patient states that he was transitioned off Coumadin to Eliquis approximately 2-3 months ago by his primary cardiologist Dr Rambo Holm secondary to complications, he states these complications were not bleeding but cannot elaborate  He states that initially when being transitioned to Eliquis he had some bleeding gums but this resolved after holding several doses  He has had no further episodes of spontaneous bleeding up until yesterday when he began noticing that his urostomy bag had blood in it  He called his primary cardiologist who recommended that he continue to hold Eliquis x2 days  The bleeding continued to get worse and darker  The patient has been feeling weak  His daughter brought him to the emergency department for further evaluation    Currently he denies any dizziness, lightheadedness but does admit to feeling weak  He denies any bleeding other than from his urostomy  He denies any chest pain or shortness of breath  Denies any lower extremity edema  States he has not taken his Eliquis in 2 days  Denies any flank pain or low back pain  Review of Systems:    Review of Systems   Constitutional: Positive for fatigue  Negative for activity change and fever  Respiratory: Negative for shortness of breath  Cardiovascular: Negative for chest pain and leg swelling  Gastrointestinal: Negative for abdominal pain, blood in stool, diarrhea, nausea and vomiting  Genitourinary: Positive for hematuria  Negative for decreased urine volume and flank pain  Musculoskeletal: Negative for back pain  Neurological: Negative for dizziness, syncope, weakness and light-headedness  Hematological: Does not bruise/bleed easily  All other systems reviewed and are negative  Past Medical and Surgical History:     Past Medical History:   Diagnosis Date    [facility]     Cancer ([facility] )     bladder    Cerebellar stroke, acute ([facility] ) 7/16/2017    CHF (congestive heart failure) (HCC)     Chronic kidney disease     COPD (chronic obstructive pulmonary disease) (HCC)     Hepatitis C     Hepatitis C     Hypertension     TIA (transient ischemic attack)        Past Surgical History:   Procedure Laterality Date    CARDIAC SURGERY      CAROTID ARTERY ANGIOPLASTY      COLONOSCOPY N/A 3/24/2017    Procedure: COLONOSCOPY;  Surgeon: Rolanda Patterson MD;  Location: [facility]; Service:     CYSTECTOMY      ESOPHAGOGASTRODUODENOSCOPY N/A 3/23/2017    Procedure: ESOPHAGOGASTRODUODENOSCOPY (EGD); Surgeon: Rolanda Patterson MD;  Location: [facility]; Service:    [facility] EYE SURGERY      ILEO CONDUIT         Meds/Allergies:    Prior to Admission medications    Medication Sig Start Date End Date Taking?  Authorizing Provider   apixaban (ELIQUIS) 2 5 mg Take 2 5 mg by mouth 2 (two) times a day   Yes Historical Provider, MD   albuterol (PROVENTIL HFA,VENTOLIN HFA) 90 mcg/act inhaler Inhale 2 puffs every 6 (six) hours as needed for wheezing    Historical Provider, MD   amLODIPine (NORVASC) 5 mg tablet Take 1 tablet by mouth daily  Patient taking differently: Take 2 5 mg by mouth daily   7/17/17   Martha Marie MD   aspirin 81 mg chewable tablet Chew 1 tablet daily 7/17/17   Martha Marie MD   atorvastatin (LIPITOR) 40 mg tablet Take 1 tablet by mouth every evening 7/17/17   Martha Marie MD   carvedilol (COREG) 6 25 mg tablet Take 6 25 mg by mouth 2 (two) times a day with meals    Historical Provider, MD   furosemide (LASIX) 40 mg tablet Take 40 mg by mouth 2 (two) times a day    Historical Provider, MD   isosorbide mononitrate (IMDUR) 60 mg 24 hr tablet Take 60 mg by mouth daily    Historical Provider, MD   ALPRAZolam Bustos Olivier) 0 5 mg tablet Take 0 5 tablets by mouth daily at bedtime as needed for anxiety or sleep for up to 7 days 2/21/17 7/11/18  Nia Carrington MD   FUROSEMIDE PO Take by mouth  7/11/18  Historical Provider, MD   isosorbide mononitrate (IMDUR) 60 mg 24 hr tablet Take 60 mg by mouth  7/11/18  Historical Provider, MD   warfarin (COUMADIN) 1 mg tablet Take 0 5 tablets by mouth daily 7/17/17 7/11/18  Martha Marie MD     I have reviewed home medications with patient family member      Allergies: No Known Allergies    Social History:     Marital Status: /Civil Union   Patient Pre-hospital Living Situation:  Home with wife  Patient Pre-hospital Level of Mobility:  Independent with cane or walker at times  Patient Pre-hospital Diet Restrictions:  None  Substance Use History:   History   Alcohol Use    Yes     Comment: RARELY      History   Smoking Status    Former Smoker    Quit date: 2/28/1973   Smokeless Tobacco    Never Used     History   Drug Use No       Family History:    Family History   Problem Relation Age of Onset    Coronary artery disease Mother        Physical Exam:     Vitals:   Blood Pressure: 135/78 (07/11/18 1348)  Pulse: 71 (07/11/18 1348)  Temperature: 98 1 °F (36 7 °C) (07/11/18 1218)  Temp Source: Oral (07/11/18 1218)  Respirations: 18 (07/11/18 1348)  Height: 5' 8 5" (174 cm) (07/11/18 1218)  Weight - Scale: 69 9 kg (154 lb 1 6 oz) (07/11/18 1259)  SpO2: 98 % (07/11/18 1348)    Physical Exam   Constitutional: He is oriented to person, place, and time  No distress  HENT:   Head: Normocephalic  Eyes: Pupils are equal, round, and reactive to light  Neck: Normal range of motion  JVD present  Cardiovascular: Normal rate, regular rhythm and intact distal pulses  Murmur heard  Pulmonary/Chest: Effort normal  He has rales (LLL)  Abdominal: Soft  Bowel sounds are normal  He exhibits no distension  There is no tenderness  Genitourinary:   Genitourinary Comments: Urostomy noted with dark, venous appearing blood  Musculoskeletal: Normal range of motion  He exhibits no edema  Neurological: He is alert and oriented to person, place, and time  Skin: Skin is warm and dry  Psychiatric: He has a normal mood and affect  Nursing note and vitals reviewed  Additional Data:     Lab Results: I have personally reviewed pertinent reports  Results from last 7 days  Lab Units 07/11/18  1246   WBC Thousand/uL 5 36   HEMOGLOBIN g/dL 11 3*   HEMATOCRIT % 36 1*   PLATELETS Thousands/uL 156   NEUTROS PCT % 56   LYMPHS PCT % 24   MONOS PCT % 12   EOS PCT % 7*       Results from last 7 days  Lab Units 07/11/18  1246   SODIUM mmol/L 137   POTASSIUM mmol/L 4 0   CHLORIDE mmol/L 104   CO2 mmol/L 28   BUN mg/dL 32*   CREATININE mg/dL 2 58*   CALCIUM mg/dL 8 4   TOTAL PROTEIN g/dL 7 9   BILIRUBIN TOTAL mg/dL 0 60   ALK PHOS U/L 87   ALT U/L 22   AST U/L 22   GLUCOSE RANDOM mg/dL 118       Results from last 7 days  Lab Units 07/11/18  1246   INR  1 34*       Imaging: I have personally reviewed pertinent reports        Ct Abdomen Pelvis Wo Contrast    Result Date: 7/11/2018  Narrative: CT ABDOMEN AND PELVIS WITHOUT IV CONTRAST INDICATION:   Gross hematuria rule out pathology, elevated creatinine cannot tolerate contrast  COMPARISON: 07/10/2017 TECHNIQUE:  CT examination of the abdomen and pelvis was performed without intravenous contrast   Axial, sagittal, and coronal 2D reformatted images were created from the source data and submitted for interpretation  Radiation dose length product (DLP) for this visit:  631 mGy-cm   This examination, like all CT scans performed in the [facility], was performed utilizing techniques to minimize radiation dose exposure, including the use of iterative reconstruction and automated exposure control  Enteric contrast was administered  FINDINGS: ABDOMEN LOWER CHEST: Large left-sided pleural effusion  Mild atelectasis at the right lung base  Atelectasis at the left lung base  LIVER/BILIARY TREE:  Unremarkable  GALLBLADDER:  Multiple gallstones in the gallbladder  SPLEEN:  Scattered splenic granulomata  PANCREAS:  Unremarkable  ADRENAL GLANDS:  Unremarkable  KIDNEYS/URETERS:  Multiple bilateral renal cysts  Scattered calcifications which appear to be related to the cysts  STOMACH AND BOWEL:  No obvious hydronephrosis although difficult to evaluate in the presence of multiple cysts  APPENDIX:  No findings to suggest appendicitis  ABDOMINOPELVIC CAVITY:  No ascites or free intraperitoneal air  No lymphadenopathy  VESSELS:  Unremarkable for patient's age  PELVIS REPRODUCTIVE ORGANS:  Penile implant and reservoir in place  URINARY BLADDER:  A cystectomy has been performed  ABDOMINAL WALL/INGUINAL REGIONS:  Ileal loop in the right lower quadrant  OSSEOUS STRUCTURES:  Degenerative changes in the lumbar spine  1st degree anterolisthesis of L4 with respect to L5  Impression: 1  Large left-sided pleural effusion  2   Bibasilar atelectasis  3   Cholelithiasis   4   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  5   Cystectomy and ileal loop  6  Penile implant and reservoir  Workstation performed: [id number]       EKG, Pathology, and Other Studies Reviewed on Admission:   · EKG: reviewed    Allscripts / Epic Records Reviewed: Yes     ** Please Note: This note has been constructed using a voice recognition system   **

## 2018-07-11 NOTE — ASSESSMENT & PLAN NOTE
· Volume overloaded  Large left pleural effusion on CT, +JVD, crackles in left lung base  · Will give one time dose of IV lasix and continue daily oral lasix pending cardiology evaluation  Patient only takes lasix PRN at home  · Continue BB  Not on ACEI maybe 2/2 CKD  · Monitor I/O, daily weights, low sodium diet

## 2018-07-11 NOTE — ASSESSMENT & PLAN NOTE
· Creatinine 2 58, at relative baseline  Follows with Dr Marci Ramirez as outpatient  · Monitor renal function closely

## 2018-07-12 ENCOUNTER — TELEPHONE (OUTPATIENT)
Dept: UROLOGY | Facility: CLINIC | Age: 83
End: 2018-07-12

## 2018-07-12 VITALS
DIASTOLIC BLOOD PRESSURE: 69 MMHG | HEIGHT: 69 IN | WEIGHT: 162 LBS | TEMPERATURE: 98.6 F | OXYGEN SATURATION: 98 % | HEART RATE: 91 BPM | BODY MASS INDEX: 23.99 KG/M2 | RESPIRATION RATE: 18 BRPM | SYSTOLIC BLOOD PRESSURE: 137 MMHG

## 2018-07-12 LAB
ALBUMIN SERPL BCP-MCNC: 2.5 G/DL (ref 3.5–5)
ALP SERPL-CCNC: 84 U/L (ref 46–116)
ALT SERPL W P-5'-P-CCNC: 20 U/L (ref 12–78)
ANION GAP SERPL CALCULATED.3IONS-SCNC: 5 MMOL/L (ref 4–13)
AST SERPL W P-5'-P-CCNC: 21 U/L (ref 5–45)
BACTERIA UR CULT: NORMAL
BASOPHILS # BLD AUTO: 0.05 THOUSANDS/ΜL (ref 0–0.1)
BASOPHILS NFR BLD AUTO: 1 % (ref 0–1)
BILIRUB SERPL-MCNC: 0.6 MG/DL (ref 0.2–1)
BUN SERPL-MCNC: 31 MG/DL (ref 5–25)
CALCIUM SERPL-MCNC: 8.4 MG/DL (ref 8.3–10.1)
CHLORIDE SERPL-SCNC: 105 MMOL/L (ref 100–108)
CO2 SERPL-SCNC: 25 MMOL/L (ref 21–32)
CREAT SERPL-MCNC: 2.32 MG/DL (ref 0.6–1.3)
EOSINOPHIL # BLD AUTO: 0.37 THOUSAND/ΜL (ref 0–0.61)
EOSINOPHIL NFR BLD AUTO: 7 % (ref 0–6)
ERYTHROCYTE [DISTWIDTH] IN BLOOD BY AUTOMATED COUNT: 14.5 % (ref 11.6–15.1)
GFR SERPL CREATININE-BSD FRML MDRD: 24 ML/MIN/1.73SQ M
GLUCOSE SERPL-MCNC: 109 MG/DL (ref 65–140)
HCT VFR BLD AUTO: 32.8 % (ref 36.5–49.3)
HCT VFR BLD AUTO: 32.8 % (ref 36.5–49.3)
HCT VFR BLD AUTO: 38.8 % (ref 36.5–49.3)
HGB BLD-MCNC: 10.4 G/DL (ref 12–17)
HGB BLD-MCNC: 10.6 G/DL (ref 12–17)
HGB BLD-MCNC: 12.3 G/DL (ref 12–17)
IMM GRANULOCYTES # BLD AUTO: 0.02 THOUSAND/UL (ref 0–0.2)
IMM GRANULOCYTES NFR BLD AUTO: 0 % (ref 0–2)
INR PPP: 1.34 (ref 0.86–1.17)
KELL GROUP AG RBC: NEGATIVE
LYMPHOCYTES # BLD AUTO: 1.22 THOUSANDS/ΜL (ref 0.6–4.47)
LYMPHOCYTES NFR BLD AUTO: 22 % (ref 14–44)
MCH RBC QN AUTO: 27.9 PG (ref 26.8–34.3)
MCHC RBC AUTO-ENTMCNC: 32.3 G/DL (ref 31.4–37.4)
MCV RBC AUTO: 86 FL (ref 82–98)
MONOCYTES # BLD AUTO: 0.59 THOUSAND/ΜL (ref 0.17–1.22)
MONOCYTES NFR BLD AUTO: 11 % (ref 4–12)
NEUTROPHILS # BLD AUTO: 3.27 THOUSANDS/ΜL (ref 1.85–7.62)
NEUTS SEG NFR BLD AUTO: 59 % (ref 43–75)
NRBC BLD AUTO-RTO: 0 /100 WBCS
PLATELET # BLD AUTO: 153 THOUSANDS/UL (ref 149–390)
PMV BLD AUTO: 10.7 FL (ref 8.9–12.7)
POTASSIUM SERPL-SCNC: 4.1 MMOL/L (ref 3.5–5.3)
PROT SERPL-MCNC: 7.5 G/DL (ref 6.4–8.2)
PROTHROMBIN TIME: 16.5 SECONDS (ref 11.8–14.2)
RBC # BLD AUTO: 3.8 MILLION/UL (ref 3.88–5.62)
SODIUM SERPL-SCNC: 135 MMOL/L (ref 136–145)
WBC # BLD AUTO: 5.52 THOUSAND/UL (ref 4.31–10.16)

## 2018-07-12 PROCEDURE — 85018 HEMOGLOBIN: CPT | Performed by: NURSE PRACTITIONER

## 2018-07-12 PROCEDURE — G8978 MOBILITY CURRENT STATUS: HCPCS

## 2018-07-12 PROCEDURE — 85610 PROTHROMBIN TIME: CPT | Performed by: NURSE PRACTITIONER

## 2018-07-12 PROCEDURE — 85014 HEMATOCRIT: CPT | Performed by: NURSE PRACTITIONER

## 2018-07-12 PROCEDURE — 99233 SBSQ HOSP IP/OBS HIGH 50: CPT | Performed by: PHYSICIAN ASSISTANT

## 2018-07-12 PROCEDURE — G8979 MOBILITY GOAL STATUS: HCPCS

## 2018-07-12 PROCEDURE — 99239 HOSP IP/OBS DSCHRG MGMT >30: CPT | Performed by: INTERNAL MEDICINE

## 2018-07-12 PROCEDURE — 97163 PT EVAL HIGH COMPLEX 45 MIN: CPT

## 2018-07-12 PROCEDURE — 80053 COMPREHEN METABOLIC PANEL: CPT | Performed by: NURSE PRACTITIONER

## 2018-07-12 PROCEDURE — 85025 COMPLETE CBC W/AUTO DIFF WBC: CPT | Performed by: NURSE PRACTITIONER

## 2018-07-12 PROCEDURE — 99232 SBSQ HOSP IP/OBS MODERATE 35: CPT | Performed by: INTERNAL MEDICINE

## 2018-07-12 RX ADMIN — AMLODIPINE BESYLATE 2.5 MG: 2.5 TABLET ORAL at 08:35

## 2018-07-12 RX ADMIN — ISOSORBIDE MONONITRATE 60 MG: 60 TABLET, EXTENDED RELEASE ORAL at 08:34

## 2018-07-12 RX ADMIN — CARVEDILOL 6.25 MG: 6.25 TABLET, FILM COATED ORAL at 08:35

## 2018-07-12 NOTE — DISCHARGE SUMMARY
Discharge- Catha Speak 6/21/1930, 80 y o  male MRN: [id number]    Unit/Bed#: -01 Encounter: [id number]    Primary Care Provider: Sandra Crandall MD   Date and time admitted to hospital: 7/11/2018 12:16 PM        * Hematuria   Assessment & Plan    · Patient admitted with hematuria from urostomy bag which began yesterday  He is on Eliquis as outpatient which he has not taken for 2 days  He was transitioned from Coumadin appx 2-3 months ago 2/2 "complications"  · Patient with history of bladder ca s/p bladder removal 19 years ago  · CT C/A/P negative for any retroperitoneal bleed  retroperitoneal US innumerable bilateral cysts  Outpatient MRI recommended for further evaluate   · Hemoglobin stable now, monitor serial H&H        CAD (coronary artery disease)   Assessment & Plan    · S/P CABG x 4 in past    · Not on ASA likely 2/2 bleeding risk  Continue BB  Intolerant of statins  CKD (chronic kidney disease) stage 4, GFR 15-29 ml/min ([facility])   Assessment & Plan    · Creatinine 2 58, at relative baseline  Follows with Dr Malu Shelby as outpatient  · Monitor renal function closely  Atrial fibrillation (HCC)   Assessment & Plan    · HOLD Eliquis  Currently in NSR  · Continue BB  CHF (congestive heart failure) (HCC)   Assessment & Plan    Euvolemic on exam   Not in acute exacerbation   Continue home dose of Lasix        HTN (hypertension)   Assessment & Plan    · BP is acceptable  Continue Coreg     · Monitor          Hemorrhage/hematuria due to Eliquis  See plan above    Discharging Physician / Practitioner: Gonzales Zendejas MD  PCP: Sandra Crandall MD  Admission Date:   Admission Orders     Ordered        07/11/18 1352  Inpatient Admission (expected length of stay for this patient is greater than two midnights)  Once             Discharge Date: 07/12/18    Resolved Problems  Date Reviewed: 7/12/2018    None          Consultations During [facility] Stay:  · Urology  · Cardiology    Procedures Performed:     · None    Significant Findings / Test Results:   CT of the abdomen pelvis  1   Large left-sided pleural effusion  2   Bibasilar atelectasis  3   Cholelithiasis  4   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  5   Cystectomy and ileal loop  6  Penile implant and reservoir  Incidental Findings:   · Innumerable bilateral renal cysts recommend outpatient MRI    Test Results Pending at Discharge (will require follow up): · None     Outpatient Tests Requested:  · None    Complications:  None    Reason for Admission:  Hematuria    Hospital Course:     Jessica Rojas is a 80 y o  male patient with past medical history of coronary artery disease status post CABG x4, AFib, hypertension, Congestive heart failure, Chronic obstructive pulmonary disease, history of CVA, CKD stage who originally presented to the hospital on 7/11/2018 due to hematuria  Patient is on Eliquis for paroxysmal atrial fibrillation, was recently switched from Coumadin as he did not tolerate due to INR issues  While on Eliquis he developed bleeding from gums, was told to stop Eliquis and had no other bleeding episodes until yesterday where he developed gross hematuria  He denies any other complaints  Upon presentation to ED he had CT of the abdomen pelvis she did show multiple bilateral renal cysts compatible with polycystic kidney disease however no other acute changes  Cardiology evaluated the patient, recommended to completely hold all anticoagulation at this time  Urology also evaluated, recommended to continue supportive management at this time  They did not recommend scoping ileal conduit at this time  Patient was kept for observation overnight, hemoglobin remained stable  Had only minimal bleeding from the ileal conduit otherwise clinically significantly improved  Urology and Cardiology cleared the patient for discharge  Patient is recommended to discontinue Eliquis, also he should follow up with his primary cardiologist within a week to discuss about other anticoagulant options like Coumadin  He did undergo ultrasound of the kidney which did show innumerable bilateral renal cysts consistent polycystic kidney disease, recommended to have outpatient MRI for further evaluation  CT abdomen on admission also showed show left-sided pleural effusion with atelectasis  However patient did not appear to be volume overloaded  , continued on chronic home dose of Lasix  Please see above list of diagnoses and related plan for additional information  Condition at Discharge: stable     Discharge Day Visit / Exam:     Subjective:    Patient seen and examined  No complaints at this time, slight bleeding noted from ileal conduit otherwise no issues  Vitals: Blood Pressure: 137/69 (07/12/18 1301)  Pulse: 91 (07/12/18 1301)  Temperature: 98 6 °F (37 °C) (07/12/18 1301)  Temp Source: Oral (07/12/18 0700)  Respirations: 18 (07/12/18 0700)  Height: 5' 9" (175 3 cm) (07/12/18 1303)  Weight - Scale: 73 5 kg (162 lb) (07/12/18 1303)  SpO2: 98 % (07/12/18 1301)  Exam:   Physical Exam   Constitutional: He is oriented to person, place, and time  He appears well-developed and well-nourished  No distress  HENT:   Head: Normocephalic and atraumatic  Eyes: EOM are normal  Pupils are equal, round, and reactive to light  Neck: Normal range of motion  Neck supple  Cardiovascular: Normal rate and regular rhythm  Pulmonary/Chest: Effort normal and breath sounds normal    Abdominal: Soft  Bowel sounds are normal    Ileal conduit slight bleeding noted   Musculoskeletal: Normal range of motion  Neurological: He is alert and oriented to person, place, and time  Skin: Skin is warm and dry         Discussion with Family:  Discussed with patient's wife at bedside    Discharge instructions/Information to patient and family:   See after visit summary for information provided to patient and family  Provisions for Follow-Up Care:  See after visit summary for information related to follow-up care and any pertinent home health orders  Disposition:     Home    For Discharges to [facility]:   · Not Applicable to this Patient - Not Applicable to this Patient    Planned Readmission:      Discharge Statement:  I spent 35 minutes discharging the patient  This time was spent on the day of discharge  I had direct contact with the patient on the day of discharge  Greater than 50% of the total time was spent examining patient, answering all patient questions, arranging and discussing plan of care with patient as well as directly providing post-discharge instructions  Additional time then spent on discharge activities  Discharge Medications:  See after visit summary for reconciled discharge medications provided to patient and family        ** Please Note: This note has been constructed using a voice recognition system **

## 2018-07-12 NOTE — PHYSICAL THERAPY NOTE
Physical Therapy Evaluation     Patient's Name: Yuli Sanchez    Admitting Diagnosis  CHF (congestive heart failure) ([place] [address] ) [I50 9]  Bleeding [R58]  Bruising [T14  8XXA]  Hematuria [R31 9]  Pleural effusion on left [J90]    Problem List  Patient Active Problem List   Diagnosis    Elevated troponin    JUSTINE (acute kidney injury) ([facility] )    H/O carotid angioplasty    HTN (hypertension)    History of CHF (congestive heart failure)    Melena    SOB (shortness of breath)    Dizziness    CHF (congestive heart failure) (HCC)    Atrial fibrillation (HCC)    CKD (chronic kidney disease) stage 4, GFR 15-29 ml/min (HCC)    Anemia    Hypertensive CKD (chronic kidney disease)    History of ileal conduit    Generalized weakness    Urinary tract infection    Cerebellar stroke, acute ([place] [address] )    Hematuria    History of CVA (cerebrovascular accident)    CAD (coronary artery disease)       Past Medical History  Past Medical History:   Diagnosis Date    Atrial fibrillation ([place] [address] )     CAD (coronary artery disease)     Cancer ([place] [address] )     bladder    Cerebellar stroke, acute ([place] [address] ) 7/16/2017    CHF (congestive heart failure) (HCC)     Chronic kidney disease     COPD (chronic obstructive pulmonary disease) (HCC)     Hepatitis C     Hypertension     TIA (transient ischemic attack)        Past Surgical History  Past Surgical History:   Procedure Laterality Date    CAROTID ARTERY ANGIOPLASTY      COLONOSCOPY N/A 3/24/2017    Procedure: COLONOSCOPY;  Surgeon: Milton Short MD;  Location: [facility]; Service:     CORONARY ARTERY BYPASS GRAFT      CYSTECTOMY      ESOPHAGOGASTRODUODENOSCOPY N/A 3/23/2017    Procedure: ESOPHAGOGASTRODUODENOSCOPY (EGD); Surgeon: Milton Short MD;  Location: [facility];   Service:     EYE SURGERY      ILEO CONDUIT        07/12/18 0745   Note Type   Note type Eval only   Pain Assessment   Pain Assessment No/denies pain   Pain Score No Pain   Home Living   Type of [address] Layout Two level;Bed/bath upstairs; Performs ADLs on one level  (15 steps up to 2nd floor set up with B handrails, 0 EDUARD)   Bathroom Shower/Tub Walk-in shower   Bathroom Toilet Standard   Bathroom Equipment Grab bars in shower; Shower chair   [facility]   Additional Comments typically doesn't require use of AD at baseline   Prior Function   Level of [place] Independent with ADLs and functional mobility   Lives With Spouse   Receives Help From Family   ADL Assistance Independent   IADLs Independent   Falls in the last 6 months 0  ((+) fall history)   Vocational Retired   Comments chronic urostomy, nocturnal O2 1 5L O2 NC  Restrictions/Precautions   Weight Bearing Precautions Per Order No   Braces or Orthoses (none per pt)   Other Precautions Chair Alarm; Bed Alarm;Multiple lines; Fall Risk;Telemetry   General   Family/Caregiver Present No   Cognition   Overall Cognitive Status WFL   Arousal/Participation Alert   Orientation Level Oriented X4   Memory Within functional limits   Following Commands Follows all commands and directions without difficulty   Comments pt agreeable to PT IE   RUE Assessment   RUE Assessment WFL   LUE Assessment   LUE Assessment WFL   RLE Assessment   RLE Assessment WFL  (grossly assessed with functional mobility: at least 3+/5)   LLE Assessment   LLE Assessment WFL  (grossly assessed with functional mobility: at least 3+/5)   Coordination   Sensation WFL  (pt denying any numbness/tingling)   Light Touch   RLE Light Touch Grossly intact   LLE Light Touch Grossly intact   Bed Mobility   Supine to Sit 4  Minimal assistance   Additional items Assist x 1;HOB elevated; Increased time required;Verbal cues   Sit to Supine (pt OOB to recliner post session, chair alarm on)   Transfers   Sit to Stand 4  Minimal assistance  (CGA)   Additional items Assist x 1; Armrests; Increased time required;Verbal cues   Stand to Sit 4  Minimal assistance  (CGA) Additional items Assist x 1; Armrests; Increased time required;Verbal cues   Additional Comments Upon seated at EOB, pt initially reporting "woozy" feeling, subsided/resolved within 10 seconds  SpO2 on RA following mobility to/from BR: 97% on RA, HR 92bpm  Pt in NAD  Ambulation/Elevation   Gait pattern Decreased foot clearance; Short stride; Step to   Gait Assistance 4  Minimal assist  (CGA)   Additional items Assist x 1;Verbal cues   Assistive Device Rolling walker   Distance 15' x2   Stair Management Assistance Not tested   Balance   Static Sitting Good   Dynamic Sitting Fair +   Static Standing Fair   Dynamic Standing [address] 2424 -   Endurance Deficit   Endurance Deficit Yes   Activity Tolerance   Activity Tolerance Patient limited by fatigue;Patient tolerated treatment well   Medical Staff Made Aware yes, spoke to [address] regarding PT recs  Nurse Made Aware yes, RN Elsi Marx verbalized pt appropriate to see, made aware of session outcome/recs  Assessment   Prognosis Good   Problem List Decreased strength;Decreased endurance; Impaired balance;Decreased mobility   Assessment Pt is 80 y o  male seen for PT evaluation on 7/12/2018 s/p admit to [facility] on 7/11/2018 w/ Hematuria  Pt presents with hematuria into urostomy bag  Pt was transitioned off Coumadin to Eliquis 2-3 months ago  Per H&P: He states that initially when being transitioned to Eliquis he had some bleeding gums but this resolved after holding several doses  He has had no further episodes of spontaneous bleeding up until yesterday when he began noticing that his urostomy bag had blood in it  PT consulted to assess pt's functional mobility and d/c needs  Order placed for PT eval and tx, w/ up w/ A and activity as tolerated order  Performed at least 2 patient identifiers during session: Name and wristband   Comorbidities affecting pt's physical performance at time of assessment include: A fib, bladder CA, cerebellar stroke, CHF, CKD, COPD, hepatitis C, HTN, TIA  PTA, pt was independent w/ all functional mobility w/ no AD/DME, SPC prn, ambulates unrestricted distances and all terrain, has 0 EDUARD, lives w/ spouse in 2 level home and retired  Personal factors affecting pt at time of IE include: inaccessible home environment, inability to navigate community distances, inability to navigate level surfaces w/o external assistance, positive fall history and decreased initiation and engagement  Please find objective findings from PT assessment regarding body systems outlined above with impairments and limitations including weakness, impaired balance, decreased endurance, gait deviations, decreased activity tolerance and fall risk, as well as mobility assessment (need for CGA-min assist w/ all phases of mobility when usually ambulating independently and need for cueing for mobility technique)  The following objective measures performed on IE also reveal limitations: Barthel Index: 35/100 and Modified Tayler: 4 (moderate/severe disability)  Pt's clinical presentation is currently unstable/unpredictable seen in pt's presentation of need for CGA-min assist w/ all phases of mobility when usually mobilizing independently, on telemetry monitoring and ongoing medical assessment  Pt to benefit from continued PT tx to address deficits as defined above and maximize level of functional independent mobility and consistency  From PT/mobility standpoint, recommendation at time of d/c would be anticipated Home PT with family support vs STR pending progress in order to facilitate return to PLOF  If family unable to provide level of assistance- will need to consider STR  CM to follow up     Barriers to Discharge Inaccessible home environment;Decreased caregiver support   Barriers to Discharge Comments pt reporting wife is currently not feeling well, ? how much assistance she will be able to provide   Goals   Patient Goals to return home   STG Expiration Date 07/22/18 Short Term Goal #1 In 7-10 days: Increase bilateral LE strength 1/2 grade to facilitate independent mobility, Perform all bed mobility tasks modified independent to decrease caregiver burden, Perform all transfers modified independent to improve independence, Ambulate > 150 ft  with least restrictive assistive device with distant S w/o LOB and w/ normalized gait pattern 100% of the time, Navigate 15 stairs with close S with B handrail to facilitate return to previous living environment, Increase all balance 1/2 grade to decrease risk for falls, Complete exercise program independently, Tolerate 4 hr OOB to faciliate upright tolerance, Improve Barthel Index score to 50 or greater to facilitate independence and PT provider will perform functional balance assessment to determine fall risk   Treatment Day 0   Plan   Treatment/Interventions Functional transfer training;LE strengthening/ROM; Therapeutic exercise; Endurance training;Patient/family training;Equipment eval/education; Bed mobility;Gait training;Spoke to nursing;Spoke to case management   PT Frequency 5x/wk   Recommendation   Recommendation Home PT; Home with family support:OT consult  (vs STR pending progress)   Equipment Recommended (continue trials with RW vs SPC)   PT - OK to Discharge No   Additional Comments pt to demonstrate consistency with level surface mobility and clear stairs prior to returning to previous living environment   Modified Grace Scale   Modified Grace Scale 4   Barthel Index   Feeding 5   Bathing 0   Grooming Score 0   Dressing Score 5   Bladder Score 0   Bowels Score 10   Toilet Use Score 5   Transfers (Bed/Chair) Score 10   Mobility (Level Surface) Score 0   Stairs Score 0   Barthel Index Score 35           Elliot Pichardo, PT, DPT

## 2018-07-12 NOTE — ASSESSMENT & PLAN NOTE
· Patient admitted with hematuria from urostomy bag which began yesterday  He is on Eliquis as outpatient which he has not taken for 2 days  He was transitioned from Coumadin appx 2-3 months ago 2/2 "complications"  · Patient with history of bladder ca s/p bladder removal 19 years ago  · CT C/A/P negative for any retroperitoneal bleed  retroperitoneal US innumerable bilateral cysts    Outpatient MRI recommended for further evaluate   · Hemoglobin stable now, monitor serial H&H

## 2018-07-12 NOTE — PLAN OF CARE
Problem: PAIN - ADULT  Goal: Verbalizes/displays adequate comfort level or baseline comfort level  Interventions:  - Encourage patient to monitor pain and request assistance  - Assess pain using appropriate pain scale  - Administer analgesics based on type and severity of pain and evaluate response  - Implement non-pharmacological measures as appropriate and evaluate response  - Consider cultural and social influences on pain and pain management  - Notify physician/advanced practitioner if interventions unsuccessful or patient reports new pain   Outcome: Progressing      Problem: DISCHARGE PLANNING  Goal: Discharge to home or other facility with appropriate resources  INTERVENTIONS:  - Identify barriers to discharge w/patient and caregiver  - Arrange for needed discharge resources and transportation as appropriate  - Identify discharge learning needs (meds, wound care, etc )  - Arrange for interpretive services to assist at discharge as needed  - Refer to Case Management Department for coordinating discharge planning if the patient needs post-hospital services based on physician/advanced practitioner order or complex needs related to functional status, cognitive ability, or social support system   Outcome: Progressing      Problem: Knowledge Deficit  Goal: Patient/family/caregiver demonstrates understanding of disease process, treatment plan, medications, and discharge instructions  Complete learning assessment and assess knowledge base  Interventions:  - Provide teaching at level of understanding  - Provide teaching via preferred learning methods   Outcome: Progressing      Problem: Nutrition/Hydration-ADULT  Goal: Nutrient/Hydration intake appropriate for improving, restoring or maintaining nutritional needs  Monitor and assess patient's nutrition/hydration status for malnutrition (ex- brittle hair, bruises, dry skin, pale skin and conjunctiva, muscle wasting, smooth red tongue, and disorientation)   Collaborate with interdisciplinary team and initiate plan and interventions as ordered  Monitor patient's weight and dietary intake as ordered or per policy  Utilize nutrition screening tool and intervene per policy  Determine patient's food preferences and provide high-protein, high-caloric foods as appropriate       INTERVENTIONS:  - Monitor oral intake, urinary output, labs, and treatment plans  - Assess nutrition and hydration status and recommend course of action  - Evaluate amount of meals eaten  - Assist patient with eating if necessary   - Allow adequate time for meals  - Recommend/ encourage appropriate diets, oral nutritional supplements, and vitamin/mineral supplements  - Order, calculate, and assess calorie counts as needed  - Recommend, monitor, and adjust tube feedings and TPN/PPN based on assessed needs  - Assess need for intravenous fluids  - Provide specific nutrition/hydration education as appropriate  - Include patient/family/caregiver in decisions related to nutrition   Outcome: Progressing

## 2018-07-12 NOTE — CASE MANAGEMENT
Initial Clinical Review    Admission: Date/Time/Statement: 7/11/18 @ 1352     Orders Placed This Encounter   Procedures    Inpatient Admission (expected length of stay for this patient is greater than two midnights)     Standing Status:   Standing     Number of Occurrences:   1     Order Specific Question:   Admitting Physician     Answer:   Layla Gutierrez [[id number]]     Order Specific Question:   Level of Care     Answer:   Med Surg [[id number]]     Order Specific Question:   Estimated length of stay     Answer:   More than 2 Midnights     Order Specific Question:   Certification     Answer:   I certify that inpatient services are medically necessary for this patient for a duration of greater than two midnights  See H&P and MD Progress Notes for additional information about the patient's course of treatment  ED: Date/Time/Mode of Arrival:   ED Arrival Information     Expected Arrival Acuity Means of Arrival Escorted By Service Admission Type    - 7/11/2018 12:11 Urgent Wheelchair Spouse General Medicine Urgent    Arrival Complaint    BLEEDING FROM COLOSTOMY BAG          Chief Complaint:   Chief Complaint   Patient presents with    Bleeding/Bruising     pt states he was put on eliquis recently and is now c/o blood in his urine, and gum bleeding  History of Illness: Janay Bermudez is a 80 y o  male with a past medical history of paroxysmal atrial fibrillation on Eliquis, bladder cancer status post bladder removal with urostomy approximately 19 years ago, hypertension, hyperlipidemia, CAD status post CABG x4, stroke, systolic congestive heart failure who presents with hematuria which began yesterday  Patient states that he was transitioned off Coumadin to Eliquis approximately 2-3 months ago by his primary cardiologist Dr Sabrina Castillo secondary to complications, he states these complications were not bleeding but cannot elaborate    He states that initially when being transitioned to Eliquis he had some bleeding gums but this resolved after holding several doses  He has had no further episodes of spontaneous bleeding up until yesterday when he began noticing that his urostomy bag had blood in it  He called his primary cardiologist who recommended that he continue to hold Eliquis x2 days  The bleeding continued to get worse and darker  The patient has been feeling weak  His daughter brought him to the emergency department for further evaluation  Currently he denies any dizziness, lightheadedness but does admit to feeling weak  He denies any bleeding other than from his urostomy  He denies any chest pain or shortness of breath  Denies any lower extremity edema  States he has not taken his Eliquis in 2 days  Denies any flank pain or low back pain  ED Vital Signs:   ED Triage Vitals   Temperature Pulse Respirations Blood Pressure SpO2   07/11/18 1218 07/11/18 1219 07/11/18 1219 07/11/18 1219 07/11/18 1219   98 1 °F (36 7 °C) 92 19 125/73 98 %      Temp Source Heart Rate Source Patient Position - Orthostatic VS BP Location FiO2 (%)   07/11/18 1218 07/11/18 1219 07/11/18 1219 07/11/18 1219 --   Oral Monitor Lying Right arm       Pain Score       07/11/18 1219       No Pain        Wt Readings from Last 1 Encounters:   07/12/18 73 8 kg (162 lb 11 2 oz)       Vital Signs (abnormal): wnl     Abnormal Labs/Diagnostic Test Results: alb   2 8, BUN creat   32  2 58, pt inr  16 4  1 34, H&H   11 3  36 1  CT abd- 1   Large left-sided pleural effusion  2   Bibasilar atelectasis  3   Cholelithiasis  4   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  5   Cystectomy and ileal loop  6  Penile implant and reservoir    CXR - Small to moderate-sized left pleural effusion  EKG-    Atrial flutter with variable A-V block with premature ventricular or aberrantly conducted complexes  Right bundle branch block          ED Treatment:   Medication Administration from 07/11/2018 1211 to 07/11/2018 1503     None          Past Medical/Surgical History: Active Ambulatory Problems     Diagnosis Date Noted    Elevated troponin 02/19/2017    JUSTINE (acute kidney injury) ([facility] ) 02/19/2017    H/O carotid angioplasty 02/19/2017    HTN (hypertension) 02/19/2017    History of CHF (congestive heart failure) 02/21/2017    Melena 03/22/2017    SOB (shortness of breath) 03/22/2017    Dizziness 03/22/2017    CH[facility]congestive heart failure) ([facility]) 03/22/2017    Atrial fibrillation ([facility]) 03/22/2017    CKD (chronic kidney disease) stage 4, GFR 15-29 ml/min ([facility]) 03/22/2017    Anemia 03/23/2017    Hypertensive CKD (chronic kidney disease) 03/23/2017    History of ileal conduit 07/13/2017    Generalized weakness 07/13/2017    Urinary tract infection 07/13/2017    Cerebellar stroke, acute ([facility] ) 07/16/2017     Resolved Ambulatory Problems     Diagnosis Date Noted    [facility] exacerb[facility] of [facility]congestive [facility] failure) [facility] 02/19/2017    Dizziness 07/13/2017     Past Medical History:   Diagnosis Date    [facility]     CAD (coronary artery disease)     Cancer ([facility]     Cerebellar stroke, acute [facility] ) 7/16/2017    CHF (congestive heart failure) ([facility])     Chronic kidney disease     COPD (chronic obstructive pulmonary disease) ([facility])     Hepatitis C     Hypertension     TIA (transient ischemic attack)        Admitting Diagnosis: CHF (congestive heart failure) ([facility] ) [I50 9]  Bleeding [R58]  Bruising [T14  8XXA]  Hematuria [R31 9]  Pleural effusion on left [J90]    Age/Sex: 80 y o  male    Assessment/Plan:   Hematuria   Assessment & Plan     · Patient admitted with hematuria from urostomy bag which began yesterday  He is on Eliquis as outpatient which he has not taken for 2 days  He was transitioned from Coumadin appx 2-3 months ago 2/2 "complications"  · Patient with history of bladder ca s/p bladder removal 19 years ago  · Blood is dark, no clots  Continue to hold all anticoagulation  No IV hydration 2/2 history of systolic CHF and patient volume overloaded at this time  · Consult cardiology and urology  I have discussed patient with urology, may require scope  · CT C/A/P negative for any retroperitoneal bleed  Will check retroperitoneal US STAT  · Hemoglobin stable now, monitor serial H&H          CHF (congestive heart failure) ([facility])   Assessment & Plan     · Volume overloaded  Large left pleural effusion on CT, +JVD, crackles in left lung base  · Will give one time dose of IV lasix and continue daily oral lasix pending cardiology evaluation  Patient only takes lasix PRN at home  · Continue BB  Not on ACEI maybe 2/2 CKD  · Monitor I/O, daily weights, low sodium diet            Atrial fibrillation ([facility])   Assessment & Plan     · HOLD Eliquis  Currently in NSR  · Continue BB            CKD (chronic kidney disease) stage 4, GFR 15-29 ml/min ([facility])   Assessment & Plan     · Creatinine 2 58, at relative baseline  Follows with Dr Yary Thomas as outpatient  · Monitor renal function closely            HTN (hypertension)   Assessment & Plan     · BP is acceptable  Continue Coreg  · Monitor           CAD (coronary artery disease)   Assessment & Plan     · S/P CABG x 4 in past    · Not on ASA likely 2/2 bleeding risk  Continue BB  Intolerant of statins            History of CVA (cerebrovascular accident)   Assessment & Plan     · Recent stroke in March, no neurological deficits                  VTE Prophylaxis: Pharmacologic VTE Prophylaxis contraindicated due to hematuria  / sequential compression device   Code Status: FULL CODE   POLST: POLST form is not discussed and not completed at this time      Anticipated Length of Stay:  Patient will be admitted on an Inpatient basis with an anticipated length of stay of  Greater than 2 midnights  Justification for Hospital Stay: close monitoring of hemodynamics, cardiology and urology evaluation         Admission Orders:  Scheduled Meds: Current Facility-Administered Medications:  acetaminophen 650 mg Oral Q6H PRN Flavia M Aline, CRNP   amLODIPine 2 5 mg Oral Daily Flavia M Aline, CRNP   carvedilol 6 25 mg Oral BID With Meals Flavia M Aline, CRNP   furosemide 40 mg Oral Daily Flavia M Aline, CRNP   isosorbide mononitrate 60 mg Oral Daily Flavia M Aline, CRNP   ondansetron 4 mg Intravenous Q6H PRN Flavia M Aline, CRNP     Continuous Infusions:    PRN Meds:   acetaminophen    ondansetron     Tele   Urology and cardiology consult   SCD  I&O   Daily weight   BRP   PT eval   Up w/ assist   Cardiac diet  Act as teresa   7/12  CMP, CBC , PT INR    H&H   11 7  36 8, pt inr   16 5  1 34, H&H   10 4 32 8, na   135, BUN creat   31  2 32, H&H   10 6  32 8    Cardiology consult  7/11  Assessment/Plan:  1  Paroxysmal A Fib:   -Continue to hold anticoagulation given gross hematuria  Will discuss restarting anticoagulation when hematuria resolves  -Continue Coreg for rate control  -INR 1 34    -given gross hematuria, will consult Urology for further management    2  Chronic systolic CHF, ischemic cardiomyopathy:  -Not in exacerbation at this time  -ECHO Feb 2017 shows EF 45%, moderate to severe MR  -continue Lasix 40 mg p o  daily, Coreg 6 25 mg p o  B i d   -no ACEI/ARB given elevated creatinine  -low-salt diet, daily weights   3  Mitral Regurgitation: Moderate to severe per ECHO last year  Will need repeat ECHO at some point, but will hold off for now    4  CAD s/p CABG x4: No anginal symptoms  Antiplatelet agents held given hematuria  Continue Coreg, Imdur  Intolerant to statins    5  Hx CVA/TIA: Recent stroke in March  No ASA as above  Intolerant to statins    6  HTN:  Stable, continue present regimen    7  HLD:  Intolerant to statins  Urology consult  7/11   Assessment/Plan    hematuria via urostomy x 1 day ,H/O Bladder Ca with cystectomy Ileal conduit 19 yrs ago  pt was on renal dose Eliquis for A-Fib  and stopped 2 days ago for bleeding gums   Came in with magda blood form urostomy  Hbg is 11  -cont hold AC  -Retroperitoneal US is pending   -telemetry  -close monitoring of physical exam  -HBG q 6 hrs   SLIM management of:   CAD/ CABG   CKD, stage IV   CHF, large left pleural effusion, volume overload,   A-Fib, h/o TIA, and cerebellar CVA  HTN  HEP C    Urology note  7/12  Assessment:    Gross hematuria  History of bladder cancer s/p cystoprostatectomy and ileal conduit [facility] years ago at [facility]  Renal cysts consistent with polycystic kidney disease  Penile prosthesis   Plan:   - renal function remains stable  Will require non-urgent non-contrast MRI outpatient for further evaluation of cysts  - hematuria still present without clots  Conduit draining well  Hemoglobin at 10 6 this morning    - recommend continued conservative measure with holding anticoagulation and continued hydration    - Would not recommend scoping ileal conduit at this time    - Patient may require further outpatient loopogram once hematuria resolves

## 2018-07-12 NOTE — PHYSICIAN ADVISOR
Current patient class: Inpatient  The patient is currently on Hospital Day: 2 at [address]      The patient was admitted to the hospital at [phone] on 7/11/18 for the following diagnosis:  CHF (congestive heart failure) ([facility] ) [I50 9]  Bleeding [R58]  Bruising [T14  8XXA]  Hematuria [R31 9]  Pleural effusion on left [J90]       There is documentation in the medical record of an expected length of stay of at least 2 midnights  The patient is therefore expected to satisfy the 2 midnight benchmark and given the 2 midnight presumption is appropriate for INPATIENT ADMISSION  Given this expectation of a satisfying stay, CMS instructs us that the patient is most often appropriate for inpatient admission under part A provided medical necessity is documented in the chart  After review of the relevant documentation, labs, vital signs and test results, the patient is appropriate for INPATIENT ADMISSION  Admission to the hospital as an inpatient is a complex decision making process which requires the practitioner to consider the patients presenting complaint, history and physical examination and all relevant testing  With this in mind, in this case, the patient was deemed appropriate for INPATIENT ADMISSION  After review of the documentation and testing available at the time of the admission I concur with this clinical determination of medical necessity  Rationale is as follows: The patient is a 80 yrs old Male who presented to the ED at 7/11/2018 12:16 PM with a chief complaint of Bleeding/Bruising (pt states he was put on eliquis recently and is now c/o blood in his urine, and gum bleeding  )     Pt admitted with hematuria on anticoagulation, CHF and afib  Pt is appropriate for inpatient status requiring greater than 2 midnights for monitoring of hemoglobin given hematuria on anticoagulation as well as monitoring hemodynamics    Also being monitored for this persistent hematuria by urology  Pleural effusion on CT requiring lasix and cards consult  The patients vitals on arrival were ED Triage Vitals   Temperature Pulse Respirations Blood Pressure SpO2   07/11/18 1218 07/11/18 1219 07/11/18 1219 07/11/18 1219 07/11/18 1219   98 1 °F (36 7 °C) 92 19 125/73 98 %      Temp Source Heart Rate Source Patient Position - Orthostatic VS BP Location FiO2 (%)   07/11/18 1218 07/11/18 1219 07/11/18 1219 07/11/18 1219 --   Oral Monitor Lying Right arm       Pain Score       07/11/18 1219       No Pain           Past Medical History:   Diagnosis Date    Atrial fibrillation (HCC)     CAD (coronary artery disease)     Cancer (HCC)     bladder    Cerebellar stroke, acute ([facility] ) 7/16/2017    CHF (congestive heart failure) (HCC)     Chronic kidney disease     COPD (chronic obstructive pulmonary disease) (HCC)     Hepatitis C     Hypertension     TIA (transient ischemic attack)      Past Surgical History:   Procedure Laterality Date    CAROTID ARTERY ANGIOPLASTY      COLONOSCOPY N/A 3/24/2017    Procedure: COLONOSCOPY;  Surgeon: Kahlil Rodriguez MD;  Location: [facility]; Service:     CORONARY ARTERY BYPASS GRAFT      CYSTECTOMY      ESOPHAGOGASTRODUODENOSCOPY N/A 3/23/2017    Procedure: ESOPHAGOGASTRODUODENOSCOPY (EGD); Surgeon: Kahlil Rodriguez MD;  Location: [facility];   Service:     EYE SURGERY      ILEO CONDUIT             Consults have been placed to:   IP CONSULT TO CARDIOLOGY  IP CONSULT TO UROLOGY    Vitals:    07/11/18 2257 07/12/18 0300 07/12/18 0600 07/12/18 0700   BP: 121/64 140/78  137/69   BP Location: Left arm Left arm  Left arm   Pulse: 93 93  91   Resp: 18 18  18   Temp: 99 1 °F (37 3 °C) 98 7 °F (37 1 °C)  98 6 °F (37 °C)   TempSrc: Oral Oral  Oral   SpO2: 96% 97%  98%   Weight:   73 8 kg (162 lb 11 2 oz)    Height:           Most recent labs:    Recent Labs      07/11/18   1246   07/12/18   0010  07/12/18   0601   WBC  5 36   --    --   5 52   HGB  11 3*   < >  10 4* 10 6*   HCT  36 1*   < >  32 8*  32 8*   PLT  156   --    --   153   K  4 0   --    --   4 1   NA  137   --    --   135*   CALCIUM  8 4   --    --   8 4   BUN  32*   --    --   31*   CREATININE  2 58*   --    --   2 32*   INR  1 34*   --   1 34*   --    AST  22   --    --   21   ALT  22   --    --   20   ALKPHOS  87   --    --   84   BILITOT  0 60   --    --   0 60    < > = values in this interval not displayed         Scheduled Meds:  Current Facility-Administered Medications:  acetaminophen 650 mg Oral Q6H PRN Falvia JOYCE Mireles, CRNP   amLODIPine 2 5 mg Oral Daily Flavia Mirelse, CRNP   carvedilol 6 25 mg Oral BID With Meals Flavia JOYCE Mireles, CRNP   furosemide 40 mg Oral Daily Flavia JOYCE Mireles, CRNP   isosorbide mononitrate 60 mg Oral Daily Flavia JOYCE Mireles, CRNP   ondansetron 4 mg Intravenous Q6H PRN Flavia Mireles, JESSENIA     Continuous Infusions:   PRN Meds:   acetaminophen    ondansetron    Surgical procedures (if appropriate):

## 2018-07-12 NOTE — PLAN OF CARE
Problem: PHYSICAL THERAPY ADULT  Goal: Performs mobility at highest level of function for planned discharge setting  See evaluation for individualized goals  Treatment/Interventions: Functional transfer training, LE strengthening/ROM, Therapeutic exercise, Endurance training, Patient/family training, Equipment eval/education, Bed mobility, Gait training, Spoke to nursing, Spoke to case management  Equipment Recommended:  (continue trials with RW vs SPC)       See flowsheet documentation for full assessment, interventions and recommendations  Prognosis: Good  Problem List: Decreased strength, Decreased endurance, Impaired balance, Decreased mobility  Assessment: Pt is 80 y o  male seen for PT evaluation on 7/12/2018 s/p admit to [facility] on 7/11/2018 w/ Hematuria  Pt presents with hematuria into urostomy bag  Pt was transitioned off Coumadin to Eliquis 2-3 months ago  Per H&P: He states that initially when being transitioned to Eliquis he had some bleeding gums but this resolved after holding several doses  He has had no further episodes of spontaneous bleeding up until yesterday when he began noticing that his urostomy bag had blood in it  PT consulted to assess pt's functional mobility and d/c needs  Order placed for PT eval and tx, w/ up w/ A and activity as tolerated order  Performed at least 2 patient identifiers during session: Name and wristband  Comorbidities affecting pt's physical performance at time of assessment include: A fib, bladder CA, cerebellar stroke, CHF, CKD, COPD, hepatitis C, HTN, TIA  PTA, pt was independent w/ all functional mobility w/ no AD/DME, SPC prn, ambulates unrestricted distances and all terrain, has 0 EDUARD, lives w/ spouse in 2 level home and retired   Personal factors affecting pt at time of IE include: inaccessible home environment, inability to navigate community distances, inability to navigate level surfaces w/o external assistance, positive fall history and decreased initiation and engagement  Please find objective findings from PT assessment regarding body systems outlined above with impairments and limitations including weakness, impaired balance, decreased endurance, gait deviations, decreased activity tolerance and fall risk, as well as mobility assessment (need for CGA-min assist w/ all phases of mobility when usually ambulating independently and need for cueing for mobility technique)  The following objective measures performed on IE also reveal limitations: Barthel Index: 35/100 and Modified [place]: 4 (moderate/severe disability)  Pt's clinical presentation is currently unstable/unpredictable seen in pt's presentation of need for CGA-min assist w/ all phases of mobility when usually mobilizing independently, on telemetry monitoring and ongoing medical assessment  Pt to benefit from continued PT tx to address deficits as defined above and maximize level of functional independent mobility and consistency  From PT/mobility standpoint, recommendation at time of d/c would be anticipated Home PT with family support vs STR pending progress in order to facilitate return to PLOF  Barriers to Discharge: Inaccessible home environment, Decreased caregiver support  Barriers to Discharge Comments: pt reporting wife is currently not feeling well, ? how much assistance she will be able to provide  Recommendation: Home PT, Home with family support (vs STR pending progress)     PT - OK to Discharge: No    See flowsheet documentation for full assessment

## 2018-07-12 NOTE — TELEPHONE ENCOUNTER
Called home phone number and it rang twice, went to voicemail, however no voicemail was set up/unable to LM  Called mobile phone and left message for PT to call to schedule

## 2018-07-12 NOTE — TELEPHONE ENCOUNTER
Patient is currently inpatient at [facility] with gross hematuria  Patient will need to be scheduled for a follow-up appointment early next week with an MD for transfer of care  He has history of bladder cancer with ileal conduit  We should try to obtain his previous urologic records prior to his appointment  Please contact the patient to arrange follow-up  Thank you

## 2018-07-12 NOTE — PLAN OF CARE
Problem: Nutrition/Hydration-ADULT  Goal: Nutrient/Hydration intake appropriate for improving, restoring or maintaining nutritional needs  Monitor and assess patient's nutrition/hydration status for malnutrition (ex- brittle hair, bruises, dry skin, pale skin and conjunctiva, muscle wasting, smooth red tongue, and disorientation)  Collaborate with interdisciplinary team and initiate plan and interventions as ordered  Monitor patient's weight and dietary intake as ordered or per policy  Utilize nutrition screening tool and intervene per policy  Determine patient's food preferences and provide high-protein, high-caloric foods as appropriate  INTERVENTIONS:  - Monitor oral intake, urinary output, labs, and treatment plans  - Assess nutrition and hydration status and recommend course of action  - Evaluate amount of meals eaten  - Assist patient with eating if necessary   - Allow adequate time for meals  - Recommend/ encourage appropriate diets, oral nutritional supplements, and vitamin/mineral supplements  - Order, calculate, and assess calorie counts as needed  - Recommend, monitor, and adjust tube feedings and TPN/PPN based on assessed needs  - Assess need for intravenous fluids  - Provide specific nutrition/hydration education as appropriate  - Include patient/family/caregiver in decisions related to nutrition  Outcome: Progressing  Goal: Pt will consume 100% of meals and maintain wt by next review  Recommend liberalize dt to regular r/t advanced age and recent wt loss

## 2018-07-12 NOTE — PROGRESS NOTES
UROLOGY PROGRESS NOTE   Patient Identifiers: Jaquelin Contreras (MRN [id number])  Date of Service: 7/12/2018    Subjective:     Patient doing well this morning  Admits to good appetite  Denies any abdominal or flank pain  Denies any complications with penile prosthesis  Objective:     VITALS:    Vitals:    07/12/18 0700   BP: 137/69   Pulse: 91   Resp: 18   Temp: 98 6 °F (37 °C)   SpO2: 98%       INS & OUTS:  I/O last 24 hours: In: 180 [P O :180]  Out: 1250 [Urine:1250]    LABS:  Lab Results   Component Value Date    HGB 10 6 (L) 07/12/2018    HCT 32 8 (L) 07/12/2018    WBC 5 52 07/12/2018     07/12/2018   ]    Lab Results   Component Value Date     (L) 07/12/2018    K 4 1 07/12/2018     07/12/2018    CO2 25 07/12/2018    BUN 31 (H) 07/12/2018    CREATININE 2 32 (H) 07/12/2018    CALCIUM 8 4 07/12/2018    GLUCOSE 109 07/12/2018   ]    INPATIENT MEDS:    Current Facility-Administered Medications:     acetaminophen (TYLENOL) tablet 650 mg, 650 mg, Oral, Q6H PRN, JESSENIA Rueda    amLODIPine (NORVASC) tablet 2 5 mg, 2 5 mg, Oral, Daily, Flavia JESSENIA Macias, Stopped at 07/11/18 1649    carvedilol (COREG) tablet 6 25 mg, 6 25 mg, Oral, BID With Meals, Flavia JESSENIA Macias, 6 25 mg at 07/11/18 1651    furosemide (LASIX) tablet 40 mg, 40 mg, Oral, Daily, Flavia JESSENIA Macias    isosorbide mononitrate (IMDUR) 24 hr tablet 60 mg, 60 mg, Oral, Daily, JESSENIA Rueda    ondansetron (ZOFRAN) injection 4 mg, 4 mg, Intravenous, Q6H PRN, JESSENIA Jett      Physical Exam:     GEN: no acute distress  Hard of hearing  Patient sitting up and eating breakfast on examination  RESP: breathing comfortably with no accessory muscle use    ABD: soft, non-tender, non-distended  Stoma pink and patent draining dark red blood without clots     EXT: no significant peripheral edema       RADIOLOGY:   RENAL ULTRASOUND     INDICATION:   HEMATURIA, UNSPECIFIED  hematuria      COMPARISON: None     TECHNIQUE:   Ultrasound of the retroperitoneum was performed with a curvilinear transducer utilizing volumetric sweeps and still imaging techniques       FINDINGS:     KIDNEYS:     Right kidney:  15 cm  Left kidney:  14 cm      Right kidney  Normal echogenicity and contour  Innumerable cysts some of which contain septations  Few calcifications within right renal cysts  No hydronephrosis  No shadowing calculi  No perinephric fluid collections      Left kidney  Normal echogenicity and contour  Innumerable cysts some of which contain septations  No hydronephrosis  No shadowing calculi  No perinephric fluid collections      URETERS:  Nonvisualized      BLADDER:   Surgically absent         IMPRESSION:     Innumerable bilateral renal cysts many of which appears to contain septations (polycystic kidneys)  Consider nonemergent outpatient follow-up contrast enhanced renal MRI for better evaluation of any nodularity since there is history of hematuria if the   patient's renal function allows for a contrast enhanced exam   Otherwise, noncontrast renal MRI can be considered  Assessment:     Gross hematuria  History of bladder cancer s/p cystoprostatectomy and ileal conduit [facility] years ago at [facility]  Renal cysts consistent with polycystic kidney disease  Penile prosthesis    Plan:   - renal function remains stable  Will require non-urgent non-contrast MRI outpatient for further evaluation of cysts  - hematuria still present without clots  Conduit draining well  Hemoglobin at 10 6 this morning    - recommend continued conservative measure with holding anticoagulation and continued hydration    - Would not recommend scoping ileal conduit at this time  - Patient may require further outpatient loopogram once hematuria resolves         Cayetano Chong PA-C

## 2018-07-12 NOTE — PROGRESS NOTES
General Cardiology   Progress Note   Yobany Blanco 80 y o  male MRN: [id number]  Unit/Bed#: -01 Encounter: [id number]      SUBJECTIVE:   No significant events overnight  Still has hematuria, although improved  Hemoglobin stable  OBJECTIVE:   Vitals:  Vitals:    07/12/18 0700   BP: 137/69   Pulse: 91   Resp: 18   Temp: 98 6 °F (37 °C)   SpO2: 98%     Body mass index is 24 38 kg/m²  Systolic (43DPW), DTK:526 , Min:121 , KHP:066     Diastolic (27LUO), JFQ:30, Min:64, Max:100      Intake/Output Summary (Last 24 hours) at 07/12/18 1104  Last data filed at 07/12/18 0601   Gross per 24 hour   Intake              180 ml   Output             1250 ml   Net            -1070 ml     Weight (last 2 days)     Date/Time   Weight    07/12/18 0600  73 8 (162 7)    07/11/18 1259  69 9 (154 1)              Telemetry Review: No significant arrhythmias seen on telemetry review  PHYSICAL EXAMS:  General:  Patient is not in acute distress, laying in the bed comfortably, awake, alert responding to commands  Head: Normocephalic, Atraumatic  HEENT:  Both pupils normal-size atraumatic, normocephalic, nonicteric  Neck:  JVP not raised  Trachea central  Respiratory:  Bronchovascular breathing all over the chest without any accompaniment  Cardiovascular:  RRR  No murmurs, rubs, gallops  GI:  Abdomen soft nontender   Liver and spleen normal size  Lymphatic:  No cervical or inguinal lymphadenopathy  Neurologic:  Patient is awake alert, responding to command, well-oriented to time and place and person moving     LABORATORY RESULTS:        CBC with diff:   Results from last 7 days  Lab Units 07/12/18  0601 07/12/18  0010 07/11/18  1915 07/11/18  1246   WBC Thousand/uL 5 52  --   --  5 36   HEMOGLOBIN g/dL 10 6* 10 4* 11 7* 11 3*   HEMATOCRIT % 32 8* 32 8* 36 8 36 1*   MCV fL 86  --   --  87   PLATELETS Thousands/uL 153  --   --  156   MCH pg 27 9  --   --  27 2   MCHC g/dL 32 3  --   --  31 3*   RDW % 14 5  --   --  14 5   MPV fL 10  7  --   --  10 3   NRBC AUTO /100 WBCs 0  --   --  0       CMP:  Results from last 7 days  Lab Units 18  0601 18  1246   SODIUM mmol/L 135* 137   POTASSIUM mmol/L 4 1 4 0   CHLORIDE mmol/L 105 104   CO2 mmol/L 25 28   ANION GAP mmol/L 5 5   BUN mg/dL 31* 32*   CREATININE mg/dL 2 32* 2 58*   GLUCOSE RANDOM mg/dL 109 118   CALCIUM mg/dL 8 4 8 4   AST U/L 21 22   ALT U/L 20 22   ALK PHOS U/L 84 87   TOTAL PROTEIN g/dL 7 5 7 9   BILIRUBIN TOTAL mg/dL 0 60 0 60   EGFR ml/min/1 73sq m 24 21       BMP:  Results from last 7 days  Lab Units 18  0601 18  1246   SODIUM mmol/L 135* 137   POTASSIUM mmol/L 4 1 4 0   CHLORIDE mmol/L 105 104   CO2 mmol/L 25 28   BUN mg/dL 31* 32*   CREATININE mg/dL 2 32* 2 58*   GLUCOSE RANDOM mg/dL 109 118   CALCIUM mg/dL 8 4 8 4                          Results from last 7 days  Lab Units 18  0010 18  1246   INR  1 34* 1 34*       Lipid Profile:   Lab Results   Component Value Date    CHOL 141 2017     Lab Results   Component Value Date    HDL 48 2017     Lab Results   Component Value Date    LDLCALC 79 2017     Lab Results   Component Value Date    TRIG 72 2017       Cardiac testing:  Results for orders placed during the hospital encounter of 17   Echo complete with contrast if indicated    Narrative [address] [place] [id number] [phone]    Transthoracic Echocardiogram  2D, M-mode, Doppler, and Color Doppler    Study date:  2017    Patient: Hill Esteves  MR number: [id number]  Account number: [de-identified]  : 1930  Age: 80 years  Gender: Male  Status: Inpatient  Location: Bedside  Height: 68 in  Weight: 181 9 lb  BP: 153/ 78 mmHg    Indications: Heart failure      Diagnoses: I50 9 - Heart failure, unspecified    Sonographer:  Jackson RCS  Interpreting Physician:  Nargis Quigley MD  Referring Physician:  Makenzie Ayala MD  Group:    [facility]    SUMMARY    LEFT VENTRICLE:  Ejection fraction was estimated to be 45 %  There was of the basal-mid inferolateral wall(s)  LEFT ATRIUM:  The atrium was markedly dilated  MITRAL VALVE:  There was moderate to severe regurgitation  AORTIC VALVE:  There was mild regurgitation  TRICUSPID VALVE:  There was trace regurgitation  IVC, HEPATIC VEINS:  The inferior vena cava was dilated  HISTORY: PRIOR HISTORY: A Fib  Congestive heart failure  Peripheral vascular disease  Risk factors: hypertension  PROCEDURE: The procedure was performed at the bedside  This was a routine study  The transthoracic approach was used  The study included complete 2D imaging, M-mode, complete spectral Doppler, and color Doppler  The heart rate was 71 bpm,  at the start of the study  Images were obtained from the parasternal, apical, subcostal, and suprasternal notch acoustic windows  Image quality was adequate  LEFT VENTRICLE: Ejection fraction was estimated to be 45 %  There was of the basal-mid inferolateral wall(s)  RIGHT VENTRICLE: The size was normal  Systolic function was normal  Wall thickness was normal     LEFT ATRIUM: The atrium was markedly dilated  RIGHT ATRIUM: Size was normal     MITRAL VALVE: DOPPLER: There was moderate to severe regurgitation  AORTIC VALVE: Leaflets exhibited mild calcification  DOPPLER: There was mild regurgitation  TRICUSPID VALVE: DOPPLER: There was trace regurgitation  Estimated peak PA pressure was 35 mmHg  PULMONIC VALVE: Leaflets exhibited normal thickness, no calcification, and normal cuspal separation  DOPPLER: The transpulmonic velocity was within the normal range  There was no regurgitation  PERICARDIUM: There was no pericardial effusion  The pericardium was normal in appearance  AORTA: The root exhibited normal size  SYSTEMIC VEINS: IVC: The inferior vena cava was dilated      SYSTEM MEASUREMENT TABLES    2D  Ao Diam: 2 8 cm  EF Biplane: 55 1 %  LA Area: 21 7 cm2  LA Diam: 4 8 cm  LVEDV MOD A2C: 157 ml  LVEDV MOD A4C: 140 9 ml  LVEDV MOD BP: 150 9 ml  LVEF MOD A2C: 42 3 %  LVEF MOD A4C: 67 2 %  LVESV MOD A2C: 90 6 ml  LVESV MOD A4C: 46 2 ml  LVESV MOD BP: 67 7 ml  LVLd A2C: 9 2 cm  LVLd A4C: 8 8 cm  LVLs A2C: 8 2 cm  LVLs A4C: 7 2 cm  RA Area: 14 8 cm2  RVIDd: 3 7 cm  SV MOD A2C: 66 4 ml  SV MOD A4C: 94 8 ml    CW  AV Env  Ti: 319 7 ms  AV VTI: 28 cm  AV Vmax: 1 4 m/s  AV Vmean: 0 9 m/s  AV maxP 7 mmHg  AV meanPG: 3 7 mmHg  TR Vmax: 2 9 m/s  TR maxP 2 mmHg    MM  TAPSE: 1 8 cm    PW  E': 0 1 m/s  E/E': 15 9  MV A Ishan: 0 4 m/s  MV Dec Gordon: 5 6 m/s2  MV DecT: 187 4 ms  MV E Ishan: 1 m/s  MV E/A Ratio: 2 6  MV PHT: 54 4 ms  MVA By PHT: 4 cm2    [address] [facility]    Prepared and electronically signed by    Sara Dubin, MD  Signed [id number] 13:45:57         Meds/Allergies   all current active meds have been reviewed and current meds:   Current Facility-Administered Medications   Medication Dose Route Frequency    acetaminophen (TYLENOL) tablet 650 mg  650 mg Oral Q6H PRN    amLODIPine (NORVASC) tablet 2 5 mg  2 5 mg Oral Daily    carvedilol (COREG) tablet 6 25 mg  6 25 mg Oral BID With Meals    furosemide (LASIX) tablet 40 mg  40 mg Oral Daily    isosorbide mononitrate (IMDUR) 24 hr tablet 60 mg  60 mg Oral Daily    ondansetron (ZOFRAN) injection 4 mg  4 mg Intravenous Q6H PRN     Prescriptions Prior to Admission   Medication    albuterol (PROVENTIL HFA,VENTOLIN HFA) 90 mcg/act inhaler    amLODIPine (NORVASC) 5 mg tablet    apixaban (ELIQUIS) 2 5 mg    carvedilol (COREG) 6 25 mg tablet    furosemide (LASIX) 40 mg tablet    isosorbide mononitrate (IMDUR) 60 mg 24 hr tablet          ASSESSMENT & PLAN   1  Paroxysmal A Fib:   -Continue to hold anticoagulation given gross hematuria  Will discuss restarting anticoagulation when hematuria resolves    Advised patient that there is a stroke risk with holding anticoagulation   -Continue Coreg for rate control  -INR 1 34    -urology following     2  Chronic systolic CHF, ischemic cardiomyopathy:  -Not in exacerbation at this time  -ECHO Feb 2017 shows EF 45%, moderate to severe MR  -continue Lasix 40 mg p o  daily, Coreg 6 25 mg p o  B i d   -no ACEI/ARB given elevated creatinine  -low-salt diet, daily weights     3  Mitral Regurgitation: Moderate to severe per ECHO last year  Will need repeat ECHO at some point, but will hold off for now      4  CAD s/p CABG x4: No anginal symptoms  Antiplatelet agents held given hematuria  Continue Coreg, Imdur  Intolerant to statins      5  Hx CVA/TIA: Recent stroke in March  No ASA as above  Intolerant to statins      6  HTN:  Stable, continue present regimen      7  HLD:  Intolerant to statins  Lizeth Medina PA-C  7/12/2018,11:04 AM    Portions of the record may have been created with voice recognition software   Occasional wrong word or "sound a like" substitutions may have occurred due to the inherent limitations of voice recognition software   Read the chart carefully and recognize, using context, where substitutions have occurred

## 2018-07-12 NOTE — ASSESSMENT & PLAN NOTE
· Creatinine 2 58, at relative baseline  Follows with Dr Chito Lira as outpatient  · Monitor renal function closely

## 2018-07-13 ENCOUNTER — TELEPHONE (OUTPATIENT)
Dept: UROLOGY | Facility: CLINIC | Age: 83
End: 2018-07-13

## 2018-07-13 NOTE — TELEPHONE ENCOUNTER
Tried to fax request to get records  Fax would not go through  Number was busy  I called phone number for Dr Evita Craig office and asked if there was another fax number  She stated no  Will try again later

## 2018-07-13 NOTE — TELEPHONE ENCOUNTER
Hematuria may take a while to resolve  Patient should continue to drink plenty of fluids  He should be scheduled with an MD beginning of next week as previously tasked

## 2018-07-13 NOTE — TELEPHONE ENCOUNTER
Called and spoke to patient, and advised hematuria may take a while to resolve and that he should increase his fluids, water intake  Advised I have discussed appointment scheduling with my practice coordinator and he will be called back with appointment  We need to try to obtain his prior records  Patient states he was treated by surgeon Dr Carter Burgos at [facility] 19 years ago

## 2018-07-13 NOTE — TELEPHONE ENCOUNTER
I discussed with Dr Jeff Ndiaye, ok to schedule on Monday 7/16/18 at 11:15 am [facility]  OK to see without prior records  Will need to try to get records if possible  I called and informed patient of appointment date/time/location and advised to come 20 minutes early for new patient paperwork

## 2018-07-13 NOTE — TELEPHONE ENCOUNTER
Returned call from patient, he states he was discharged from [facility] [address] and continues to have blood in his urine  Reviewed his chart, seen in consult with MARITZA Jaimes on 7/11/18, patient with history of bladder cancer and ileal conduit 19 years ago  Spoke to patient he states he has stopped the Eliquis as recommended and it has been 4 days, he continues to have gross hematuria in his drainage bag, states no improvement   He spoke to cardiologist as well who is aware is off his Eliquis  Patient calling to be seen today  Advised patient that message will be sent to provider to review and recommend  Noted this is a separate encounter to schedule patient next week   Advised will call back to see if there is a recommendation for the continued gross hematuria he is experiencing today and call him back  He does not have any prior records states he had been treated at [facility] in [place] 19 years ago  Please advise  Patient has no pending appointment at this time

## 2018-07-16 ENCOUNTER — OFFICE VISIT (OUTPATIENT)
Dept: UROLOGY | Facility: CLINIC | Age: 83
End: 2018-07-16
Payer: MEDICARE

## 2018-07-16 VITALS
BODY MASS INDEX: 23.79 KG/M2 | WEIGHT: 157 LBS | DIASTOLIC BLOOD PRESSURE: 66 MMHG | SYSTOLIC BLOOD PRESSURE: 110 MMHG | HEIGHT: 68 IN | HEART RATE: 88 BPM

## 2018-07-16 DIAGNOSIS — Z86.79 HISTORY OF CHF (CONGESTIVE HEART FAILURE): ICD-10-CM

## 2018-07-16 DIAGNOSIS — Z98.890 HISTORY OF ILEAL CONDUIT: ICD-10-CM

## 2018-07-16 DIAGNOSIS — I48.19 PERSISTENT ATRIAL FIBRILLATION (HCC): Primary | ICD-10-CM

## 2018-07-16 DIAGNOSIS — R31.0 GROSS HEMATURIA: ICD-10-CM

## 2018-07-16 DIAGNOSIS — N18.4 CKD (CHRONIC KIDNEY DISEASE) STAGE 4, GFR 15-29 ML/MIN (HCC): ICD-10-CM

## 2018-07-16 DIAGNOSIS — C67.9 MALIGNANT NEOPLASM OF URINARY BLADDER, UNSPECIFIED SITE (HCC): ICD-10-CM

## 2018-07-16 PROCEDURE — 99215 OFFICE O/P EST HI 40 MIN: CPT | Performed by: UROLOGY

## 2018-07-16 NOTE — LETTER
July 16, 2018     Kaitlyn Bell MD  [id number] [facility]    Patient: Luis Wen   YOB: 1930   Date of Visit: 7/16/2018       Dear Dr Kaci Jefferson: Thank you for referring Luis Wen to me for evaluation  Below are my notes for this consultation  If you have questions, please do not hesitate to call me  I look forward to following your patient along with you  Sincerely,        Elan Porras MD        CC: DO Elan Cruz MD  7/16/2018 11:55 AM  Signed  Referring Physician: Kaitlyn Bell MD  A copy of this note was sent to the referring physician  Diagnoses and all orders for this visit:    Persistent atrial fibrillation (HCC)    CKD (chronic kidney disease) stage 4, GFR 15-29 ml/min (HCC)    History of CHF (congestive heart failure)    History of ileal conduit  -     Cystoscopy; Future    Malignant neoplasm of urinary bladder, unspecified site [facility])    Gross hematuria  -     Cystoscopy; Future            Assessment and plan:       1  History of invasive small-cell carcinoma of the bladder  -radical cysto prostatectomy with ileal conduit following neoadjuvant chemotherapy, [facility] Dr Prabha Tovar, 19 years ago  -DALTON on recent imaging    2  Recent episode of gross hematuria  -shortly after changing to a different anticoagulation agent  -resolve    3  CT with evidence of polycystic kidney disease, no concerning upper tract lesions    Suspect that the patient's hematuria was related to changing from Coumadin to Eliquis  His CT scan was overall very reassuring  First there is no evidence of recurrent disease from his high risk muscle invasive bladder cancer  In addition there were no radiographically discernible lesions to account for his hematuria  I have recommended endoscopy of his loop to be performed on a nonurgent basis as his hematuria has improved  I recommended flexible cystoscopy for further evaluation    Discussed the risks benefits and alternatives to this diagnostic procedure  Risks include but are not limited to hematuria, pain, urinary tract infection, stricture formation, possible need for hospitalization  Patient verbalized understanding and has elected to proceed  Cystoscopy will be scheduled in the near future  We will also CC Dr Wong Bustillos for consideration of switching him back to Coumadin      Rommel Wallace MD      Chief Complaint     Gross hematuria follow-up      History of Present Illness     Elvira Spence is a 80 y o  male with a history of invasive small-cell carcinoma of the bladder  He underwent a radical cysto prostatectomy by Dr Yesika Lisa at [facility] 19 years ago following neoadjuvant chemotherapy  He has been free of any recurrent disease since that time  He has not had any survivor ship issues related to his conduit until recently  He presented with acute onset gross hematuria  This required hospitalization for IV hydration  This was observed  His Eliquis for atrial fibrillation and CHF was discontinued  and his hematuria improved  He does have a history of chronic kidney disease as well as polycystic kidney disease  He also had a penile prosthesis placed for erectile dysfunction  He has had no troubles with this recently  Detailed Urologic History     - please refer to HPI    Review of Systems     Review of Systems   Constitutional: Negative for activity change and fatigue  HENT: Negative for congestion  Eyes: Negative for visual disturbance  Respiratory: Negative for shortness of breath and wheezing  Cardiovascular: Negative for chest pain and leg swelling  Gastrointestinal: Negative for abdominal pain  Endocrine: Negative for polyuria  Genitourinary: Positive for hematuria  Negative for dysuria, flank pain and urgency  Musculoskeletal: Negative for back pain  Allergic/Immunologic: Negative for immunocompromised state  Neurological: Negative for dizziness and numbness  Psychiatric/Behavioral: Negative for dysphoric mood  All other systems reviewed and are negative  Allergies     No Known Allergies    Physical Exam     Physical Exam   Constitutional: He is oriented to person, place, and time  He appears well-developed and well-nourished  No distress  Elderly male in no apparent distress   HENT:   Head: Normocephalic and atraumatic  Eyes: EOM are normal    Neck: Normal range of motion  Cardiovascular:   Negative lower extremity edema   Pulmonary/Chest: Effort normal and breath sounds normal    Abdominal: Soft  Genitourinary:   Genitourinary Comments: Ileal conduit right lower quadrant pink and productive of clear urine  Lower midline incision noted without hernia  Penile prosthesis is in good position  Musculoskeletal: Normal range of motion  Neurological: He is alert and oriented to person, place, and time  Skin: Skin is warm  Psychiatric: He has a normal mood and affect   His behavior is normal            Vital Signs  Vitals:    07/16/18 1035   BP: 110/66   BP Location: Left arm   Patient Position: Sitting   Cuff Size: Adult   Pulse: 88   Weight: 71 2 kg (157 lb)   Height: 5' 8" (1 727 m)         Current Medications       Current Outpatient Prescriptions:     albuterol (PROVENTIL HFA,VENTOLIN HFA) 90 mcg/act inhaler, Inhale 2 puffs every 6 (six) hours as needed for wheezing, Disp: , Rfl:     amLODIPine (NORVASC) 5 mg tablet, Take 1 tablet by mouth daily (Patient taking differently: Take 2 5 mg by mouth daily  ), Disp: 30 tablet, Rfl: 0    carvedilol (COREG) 6 25 mg tablet, Take 6 25 mg by mouth 2 (two) times a day with meals, Disp: , Rfl:     furosemide (LASIX) 40 mg tablet, Take 40 mg by mouth daily as needed  , Disp: , Rfl:     isosorbide mononitrate (IMDUR) 60 mg 24 hr tablet, Take 60 mg by mouth daily, Disp: , Rfl:       Active Problems     Patient Active Problem List   Diagnosis    Elevated troponin    JUSTINE [facility]acute kidney injury) ([facility] [address] )    H/O carotid angioplasty    HTN (hypertension)    History of CHF (congestive heart failure)    Melena    SOB (shortness of breath)    Dizziness    CHF (congestive heart failure) (HCC)    Atrial fibrillation (HCC)    CKD (chronic kidney disease) stage 4, GFR 15-29 ml/min (HCC)    Anemia    Hypertensive CKD (chronic kidney disease)    History of ileal conduit    Generalized weakness    Urinary tract infection    Cerebellar stroke, acute (HCC)    Hematuria    History of CVA (cerebrovascular accident)    CAD (coronary artery disease)    Malignant neoplasm of urinary bladder (HCC)         Past Medical History     Past Medical History:   Diagnosis Date    Atrial fibrillation (HCC)     CAD (coronary artery disease)     Cancer (HCC)     bladder    Cerebellar stroke, acute ([facility] [address] ) 7/16/2017    CHF (congestive heart failure) (HCC)     Chronic kidney disease     COPD (chronic obstructive pulmonary disease) (HCC)     Hepatitis C     Hypertension     TIA (transient ischemic attack)          Surgical History     Past Surgical History:   Procedure Laterality Date    CAROTID ARTERY ANGIOPLASTY      COLONOSCOPY N/A 3/24/2017    Procedure: COLONOSCOPY;  Surgeon: Olesya Pope MD;  Location: [facility]; Service:     CORONARY ARTERY BYPASS GRAFT      CYSTECTOMY      ESOPHAGOGASTRODUODENOSCOPY N/A 3/23/2017    Procedure: ESOPHAGOGASTRODUODENOSCOPY (EGD); Surgeon: Olesya Pope MD;  Location: [facility];   Service:    Levora Helms EYE SURGERY      ILEO CONDUIT           Family History     Family History   Problem Relation Age of Onset    Coronary artery disease Mother          Social History     Social History     History   Smoking Status    Former Smoker    Quit date: 2/28/1973   Smokeless Tobacco    Never Used         Pertinent Lab Values     Lab Results   Component Value Date    CREATININE 2 32 (H) 07/12/2018       No results found for: PSA    @RESULTRCNT(1H])@      Pertinent Imaging     FINDINGS:     ABDOMEN     LOWER CHEST: Large left-sided pleural effusion  Mild atelectasis at the right lung base  Atelectasis at the left lung base  LIVER/BILIARY TREE:  Unremarkable      GALLBLADDER:  Multiple gallstones in the gallbladder      SPLEEN:  Scattered splenic granulomata      PANCREAS:  Unremarkable      ADRENAL GLANDS:  Unremarkable      KIDNEYS/URETERS:  Multiple bilateral renal cysts  Scattered calcifications which appear to be related to the cysts      STOMACH AND BOWEL:  No obvious hydronephrosis although difficult to evaluate in the presence of multiple cysts      APPENDIX:  No findings to suggest appendicitis      ABDOMINOPELVIC CAVITY:  No ascites or free intraperitoneal air  No lymphadenopathy      VESSELS:  Unremarkable for patient's age      PELVIS     REPRODUCTIVE ORGANS:  Penile implant and reservoir in place      URINARY BLADDER:  A cystectomy has been performed      ABDOMINAL WALL/INGUINAL REGIONS:  Ileal loop in the right lower quadrant      OSSEOUS STRUCTURES:  Degenerative changes in the lumbar spine  1st degree anterolisthesis of L4 with respect to L5      IMPRESSION:        1  Large left-sided pleural effusion  2   Bibasilar atelectasis  3   Cholelithiasis  4   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  5   Cystectomy and ileal loop  6  Penile implant and reservoir  Portions of the record may have been created with voice recognition software   Occasional wrong word or "sound a like" substitutions may have occurred due to the inherent limitations of voice recognition software   Read the chart carefully and recognize, using context, where substitutions have occurred

## 2018-07-16 NOTE — PROGRESS NOTES
Referring Physician: Charles Nguyen MD  A copy of this note was sent to the referring physician  Diagnoses and all orders for this visit:    Persistent atrial fibrillation (HCC)    CKD (chronic kidney disease) stage 4, GFR 15-29 ml/min (HCC)    History of CHF (congestive heart failure)    History of ileal conduit  -     Cystoscopy; Future    Malignant neoplasm of urinary bladder, unspecified site [facility])    Gross hematuria  -     Cystoscopy; Future            Assessment and plan:       1  History of invasive small-cell carcinoma of the bladder  -radical cysto prostatectomy with ileal conduit following neoadjuvant chemotherapy, [facility] Dr Jak Dobson, 19 years ago  -DALTON on recent imaging    2  Recent episode of gross hematuria  -shortly after changing to a different anticoagulation agent  -resolve    3  CT with evidence of polycystic kidney disease, no concerning upper tract lesions    Suspect that the patient's hematuria was related to changing from Coumadin to Eliquis  His CT scan was overall very reassuring  First there is no evidence of recurrent disease from his high risk muscle invasive bladder cancer  In addition there were no radiographically discernible lesions to account for his hematuria  I have recommended endoscopy of his loop to be performed on a nonurgent basis as his hematuria has improved  I recommended flexible cystoscopy for further evaluation  Discussed the risks benefits and alternatives to this diagnostic procedure  Risks include but are not limited to hematuria, pain, urinary tract infection, stricture formation, possible need for hospitalization  Patient verbalized understanding and has elected to proceed  Cystoscopy will be scheduled in the near future  We will also CC Dr Javon Urban for consideration of switching him back to Coumadin      Zuleyma Hernandez MD      Chief Complaint     Gross hematuria follow-up      History of Present Illness     [facility] is a 80 y o  male with a history of invasive small-cell carcinoma of the bladder  He underwent a radical cysto prostatectomy by Dr Maria Eugenia Good at [facility] 19 years ago following neoadjuvant chemotherapy  He has been free of any recurrent disease since that time  He has not had any survivor ship issues related to his conduit until recently  He presented with acute onset gross hematuria  This required hospitalization for IV hydration  This was observed  His Eliquis for atrial fibrillation and CHF was discontinued  and his hematuria improved  He does have a history of chronic kidney disease as well as polycystic kidney disease  He also had a penile prosthesis placed for erectile dysfunction  He has had no troubles with this recently  Detailed Urologic History     - please refer to HPI    Review of Systems     Review of Systems   Constitutional: Negative for activity change and fatigue  HENT: Negative for congestion  Eyes: Negative for visual disturbance  Respiratory: Negative for shortness of breath and wheezing  Cardiovascular: Negative for chest pain and leg swelling  Gastrointestinal: Negative for abdominal pain  Endocrine: Negative for polyuria  Genitourinary: Positive for hematuria  Negative for dysuria, flank pain and urgency  Musculoskeletal: Negative for back pain  Allergic/Immunologic: Negative for immunocompromised state  Neurological: Negative for dizziness and numbness  Psychiatric/Behavioral: Negative for dysphoric mood  All other systems reviewed and are negative  Allergies     No Known Allergies    Physical Exam     Physical Exam   Constitutional: He is oriented to person, place, and time  He appears well-developed and well-nourished  No distress  Elderly male in no apparent distress   HENT:   Head: Normocephalic and atraumatic  Eyes: EOM are normal    Neck: Normal range of motion     Cardiovascular:   Negative lower extremity edema Pulmonary/Chest: Effort normal and breath sounds normal    Abdominal: Soft  Genitourinary:   Genitourinary Comments: Ileal conduit right lower quadrant pink and productive of clear urine  Lower midline incision noted without hernia  Penile prosthesis is in good position  Musculoskeletal: Normal range of motion  Neurological: He is alert and oriented to person, place, and time  Skin: Skin is warm  Psychiatric: He has a normal mood and affect   His behavior is normal            Vital Signs  Vitals:    07/16/18 1035   BP: 110/66   BP Location: Left arm   Patient Position: Sitting   Cuff Size: Adult   Pulse: 88   Weight: 71 2 kg (157 lb)   Height: 5' 8" (1 727 m)         Current Medications       Current Outpatient Prescriptions:     albuterol (PROVENTIL HFA,VENTOLIN HFA) 90 mcg/act inhaler, Inhale 2 puffs every 6 (six) hours as needed for wheezing, Disp: , Rfl:     amLODIPine (NORVASC) 5 mg tablet, Take 1 tablet by mouth daily (Patient taking differently: Take 2 5 mg by mouth daily  ), Disp: 30 tablet, Rfl: 0    carvedilol (COREG) 6 25 mg tablet, Take 6 25 mg by mouth 2 (two) times a day with meals, Disp: , Rfl:     furosemide (LASIX) 40 mg tablet, Take 40 mg by mouth daily as needed  , Disp: , Rfl:     isosorbide mononitrate (IMDUR) 60 mg 24 hr tablet, Take 60 mg by mouth daily, Disp: , Rfl:       Active Problems     Patient Active Problem List   Diagnosis    Elevated troponin    JUSTINE [facility]acute [facility]) ([facility] [address]    H/O carotid angioplasty    HTN (hypertension)    History of CHF (congestive heart failure)    Melena    SOB (shortness of breath)    Dizziness    CHF (congestive heart failure) (HCC)    Atrial fibrillation (HCC)    CKD (chronic kidney disease) stage 4, GFR 15-29 ml/min ([facility])    Anemia    Hypertensive CKD (chronic kidney disease)    History of ileal conduit    Generalized weakness    Urinary tract infection    Cerebellar stroke, acute ([facility])    Hematuria    History of CVA (cerebrovascular accident)    CAD (coronary artery disease)    Malignant neoplasm of urinary bladder (HCC)         Past Medical History     Past Medical History:   Diagnosis Date    Atrial fibrillation ([facility] [address] )     CAD (coronary artery disease)     Cancer (HCC)     bladder    Cerebellar stroke, acute ([facility] [address] ) 7/16/2017    CHF (congestive heart failure) (HCC)     Chronic kidney disease     COPD (chronic obstructive pulmonary disease) (HCC)     Hepatitis C     Hypertension     TIA (transient ischemic attack)          Surgical History     Past Surgical History:   Procedure Laterality Date    CAROTID ARTERY ANGIOPLASTY      COLONOSCOPY N/A 3/24/2017    Procedure: COLONOSCOPY;  Surgeon: Brigid Ennis MD;  Location: [facility]; Service:     CORONARY ARTERY BYPASS GRAFT      CYSTECTOMY      ESOPHAGOGASTRODUODENOSCOPY N/A 3/23/2017    Procedure: ESOPHAGOGASTRODUODENOSCOPY (EGD); Surgeon: Brigid Ennis MD;  Location: [facility]; Service:     EYE SURGERY      ILEO CONDUIT           Family History     Family History   Problem Relation Age of Onset    Coronary artery disease Mother          Social History     Social History     History   Smoking Status    Former Smoker    Quit date: 2/28/1973   Smokeless Tobacco    Never Used         Pertinent Lab Values     Lab Results   Component Value Date    CREATININE 2 32 (H) 07/12/2018       No results found for: PSA    @RESULTRCNT(1H])@      Pertinent Imaging     FINDINGS:     ABDOMEN     LOWER CHEST: Large left-sided pleural effusion  Mild atelectasis at the right lung base  Atelectasis at the left lung base  LIVER/BILIARY TREE:  Unremarkable      GALLBLADDER:  Multiple gallstones in the gallbladder      SPLEEN:  Scattered splenic granulomata      PANCREAS:  Unremarkable      ADRENAL GLANDS:  Unremarkable      KIDNEYS/URETERS:  Multiple bilateral renal cysts    Scattered calcifications which appear to be related to the cysts      STOMACH AND BOWEL:  No obvious hydronephrosis although difficult to evaluate in the presence of multiple cysts      APPENDIX:  No findings to suggest appendicitis      ABDOMINOPELVIC CAVITY:  No ascites or free intraperitoneal air  No lymphadenopathy      VESSELS:  Unremarkable for patient's age      PELVIS     REPRODUCTIVE ORGANS:  Penile implant and reservoir in place      URINARY BLADDER:  A cystectomy has been performed      ABDOMINAL WALL/INGUINAL REGIONS:  Ileal loop in the right lower quadrant      OSSEOUS STRUCTURES:  Degenerative changes in the lumbar spine  1st degree anterolisthesis of L4 with respect to L5      IMPRESSION:        1  Large left-sided pleural effusion  2   Bibasilar atelectasis  3   Cholelithiasis  4   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  5   Cystectomy and ileal loop  6  Penile implant and reservoir  Portions of the record may have been created with voice recognition software   Occasional wrong word or "sound a like" substitutions may have occurred due to the inherent limitations of voice recognition software   Read the chart carefully and recognize, using context, where substitutions have occurred

## 2018-08-24 ENCOUNTER — APPOINTMENT (INPATIENT)
Dept: NON INVASIVE DIAGNOSTICS | Facility: HOSPITAL | Age: 83
DRG: 291 | End: 2018-08-24
Payer: MEDICARE

## 2018-08-24 ENCOUNTER — APPOINTMENT (EMERGENCY)
Dept: CT IMAGING | Facility: HOSPITAL | Age: 83
DRG: 291 | End: 2018-08-24
Payer: MEDICARE

## 2018-08-24 ENCOUNTER — APPOINTMENT (INPATIENT)
Dept: CT IMAGING | Facility: HOSPITAL | Age: 83
DRG: 291 | End: 2018-08-24
Payer: MEDICARE

## 2018-08-24 ENCOUNTER — HOSPITAL ENCOUNTER (INPATIENT)
Facility: HOSPITAL | Age: 83
LOS: 2 days | Discharge: HOME | DRG: 291 | End: 2018-08-26
Attending: EMERGENCY MEDICINE | Admitting: FAMILY MEDICINE
Payer: MEDICARE

## 2018-08-24 ENCOUNTER — APPOINTMENT (EMERGENCY)
Dept: RADIOLOGY | Facility: HOSPITAL | Age: 83
DRG: 291 | End: 2018-08-24
Payer: MEDICARE

## 2018-08-24 DIAGNOSIS — N17.9 AKI (ACUTE KIDNEY INJURY) (HCC): ICD-10-CM

## 2018-08-24 DIAGNOSIS — Z86.73 HISTORY OF CEREBELLAR STROKE: ICD-10-CM

## 2018-08-24 DIAGNOSIS — I50.9 ACUTE EXACERBATION OF CHF (CONGESTIVE HEART FAILURE) (HCC): Primary | ICD-10-CM

## 2018-08-24 DIAGNOSIS — R07.89 INTERMITTENT LEFT-SIDED CHEST PAIN: ICD-10-CM

## 2018-08-24 DIAGNOSIS — I50.21 ACUTE SYSTOLIC (CONGESTIVE) HEART FAILURE (HCC): ICD-10-CM

## 2018-08-24 DIAGNOSIS — N18.4 CKD (CHRONIC KIDNEY DISEASE) STAGE 4, GFR 15-29 ML/MIN (HCC): ICD-10-CM

## 2018-08-24 DIAGNOSIS — I10 ESSENTIAL HYPERTENSION: Chronic | ICD-10-CM

## 2018-08-24 DIAGNOSIS — R06.00 DYSPNEA: ICD-10-CM

## 2018-08-24 DIAGNOSIS — R42 DIZZINESS: ICD-10-CM

## 2018-08-24 DIAGNOSIS — I48.0 PAROXYSMAL ATRIAL FIBRILLATION (HCC): ICD-10-CM

## 2018-08-24 DIAGNOSIS — R53.1 GENERALIZED WEAKNESS: ICD-10-CM

## 2018-08-24 LAB
ALBUMIN SERPL BCP-MCNC: 2.9 G/DL (ref 3.5–5)
ALP SERPL-CCNC: 92 U/L (ref 46–116)
ALT SERPL W P-5'-P-CCNC: 17 U/L (ref 12–78)
ANION GAP SERPL CALCULATED.3IONS-SCNC: 9 MMOL/L (ref 4–13)
APTT PPP: 44 SECONDS (ref 24–36)
AST SERPL W P-5'-P-CCNC: 17 U/L (ref 5–45)
ATRIAL RATE: 91 BPM
BASOPHILS # BLD AUTO: 0.04 THOUSANDS/ΜL (ref 0–0.1)
BASOPHILS NFR BLD AUTO: 1 % (ref 0–1)
BILIRUB SERPL-MCNC: 0.9 MG/DL (ref 0.2–1)
BUN SERPL-MCNC: 34 MG/DL (ref 5–25)
CALCIUM SERPL-MCNC: 8.8 MG/DL (ref 8.3–10.1)
CHLORIDE SERPL-SCNC: 103 MMOL/L (ref 100–108)
CO2 SERPL-SCNC: 24 MMOL/L (ref 21–32)
CREAT SERPL-MCNC: 2.55 MG/DL (ref 0.6–1.3)
EOSINOPHIL # BLD AUTO: 0.11 THOUSAND/ΜL (ref 0–0.61)
EOSINOPHIL NFR BLD AUTO: 2 % (ref 0–6)
ERYTHROCYTE [DISTWIDTH] IN BLOOD BY AUTOMATED COUNT: 15.5 % (ref 11.6–15.1)
GFR SERPL CREATININE-BSD FRML MDRD: 22 ML/MIN/1.73SQ M
GLUCOSE SERPL-MCNC: 116 MG/DL (ref 65–140)
HCT VFR BLD AUTO: 36 % (ref 36.5–49.3)
HGB BLD-MCNC: 11.1 G/DL (ref 12–17)
IMM GRANULOCYTES # BLD AUTO: 0.01 THOUSAND/UL (ref 0–0.2)
IMM GRANULOCYTES NFR BLD AUTO: 0 % (ref 0–2)
INR PPP: 1.68 (ref 0.86–1.17)
LACTATE SERPL-SCNC: 1.1 MMOL/L (ref 0.5–2)
LYMPHOCYTES # BLD AUTO: 0.87 THOUSANDS/ΜL (ref 0.6–4.47)
LYMPHOCYTES NFR BLD AUTO: 18 % (ref 14–44)
MAGNESIUM SERPL-MCNC: 2.1 MG/DL (ref 1.6–2.6)
MCH RBC QN AUTO: 26.2 PG (ref 26.8–34.3)
MCHC RBC AUTO-ENTMCNC: 30.8 G/DL (ref 31.4–37.4)
MCV RBC AUTO: 85 FL (ref 82–98)
MONOCYTES # BLD AUTO: 0.44 THOUSAND/ΜL (ref 0.17–1.22)
MONOCYTES NFR BLD AUTO: 9 % (ref 4–12)
NEUTROPHILS # BLD AUTO: 3.28 THOUSANDS/ΜL (ref 1.85–7.62)
NEUTS SEG NFR BLD AUTO: 70 % (ref 43–75)
NRBC BLD AUTO-RTO: 0 /100 WBCS
NT-PROBNP SERPL-MCNC: ABNORMAL PG/ML
PLATELET # BLD AUTO: 145 THOUSANDS/UL (ref 149–390)
PMV BLD AUTO: 11.1 FL (ref 8.9–12.7)
POTASSIUM SERPL-SCNC: 4.2 MMOL/L (ref 3.5–5.3)
PROT SERPL-MCNC: 8.1 G/DL (ref 6.4–8.2)
PROTHROMBIN TIME: 19.6 SECONDS (ref 11.8–14.2)
QRS AXIS: 140 DEGREES
QRSD INTERVAL: 148 MS
QT INTERVAL: 430 MS
QTC INTERVAL: 534 MS
RBC # BLD AUTO: 4.23 MILLION/UL (ref 3.88–5.62)
SODIUM SERPL-SCNC: 136 MMOL/L (ref 136–145)
T WAVE AXIS: 27 DEGREES
TROPONIN I SERPL-MCNC: 0.04 NG/ML
TROPONIN I SERPL-MCNC: 0.04 NG/ML
VENTRICULAR RATE: 93 BPM
WBC # BLD AUTO: 4.75 THOUSAND/UL (ref 4.31–10.16)

## 2018-08-24 PROCEDURE — 83735 ASSAY OF MAGNESIUM: CPT | Performed by: EMERGENCY MEDICINE

## 2018-08-24 PROCEDURE — 99222 1ST HOSP IP/OBS MODERATE 55: CPT | Performed by: INTERNAL MEDICINE

## 2018-08-24 PROCEDURE — 80053 COMPREHEN METABOLIC PANEL: CPT

## 2018-08-24 PROCEDURE — 83605 ASSAY OF LACTIC ACID: CPT | Performed by: EMERGENCY MEDICINE

## 2018-08-24 PROCEDURE — 93005 ELECTROCARDIOGRAM TRACING: CPT

## 2018-08-24 PROCEDURE — 99223 1ST HOSP IP/OBS HIGH 75: CPT | Performed by: FAMILY MEDICINE

## 2018-08-24 PROCEDURE — 94760 N-INVAS EAR/PLS OXIMETRY 1: CPT

## 2018-08-24 PROCEDURE — 84484 ASSAY OF TROPONIN QUANT: CPT | Performed by: EMERGENCY MEDICINE

## 2018-08-24 PROCEDURE — 83880 ASSAY OF NATRIURETIC PEPTIDE: CPT

## 2018-08-24 PROCEDURE — 85730 THROMBOPLASTIN TIME PARTIAL: CPT

## 2018-08-24 PROCEDURE — 71250 CT THORAX DX C-: CPT

## 2018-08-24 PROCEDURE — 93306 TTE W/DOPPLER COMPLETE: CPT

## 2018-08-24 PROCEDURE — 93010 ELECTROCARDIOGRAM REPORT: CPT | Performed by: INTERNAL MEDICINE

## 2018-08-24 PROCEDURE — 93306 TTE W/DOPPLER COMPLETE: CPT | Performed by: INTERNAL MEDICINE

## 2018-08-24 PROCEDURE — 84484 ASSAY OF TROPONIN QUANT: CPT

## 2018-08-24 PROCEDURE — 84145 PROCALCITONIN (PCT): CPT | Performed by: FAMILY MEDICINE

## 2018-08-24 PROCEDURE — 94664 DEMO&/EVAL PT USE INHALER: CPT

## 2018-08-24 PROCEDURE — 36415 COLL VENOUS BLD VENIPUNCTURE: CPT

## 2018-08-24 PROCEDURE — 85025 COMPLETE CBC W/AUTO DIFF WBC: CPT

## 2018-08-24 PROCEDURE — 99285 EMERGENCY DEPT VISIT HI MDM: CPT

## 2018-08-24 PROCEDURE — 85610 PROTHROMBIN TIME: CPT

## 2018-08-24 PROCEDURE — 70450 CT HEAD/BRAIN W/O DYE: CPT

## 2018-08-24 PROCEDURE — 74176 CT ABD & PELVIS W/O CONTRAST: CPT

## 2018-08-24 RX ORDER — ALBUTEROL SULFATE 90 UG/1
2 AEROSOL, METERED RESPIRATORY (INHALATION) EVERY 6 HOURS PRN
Status: DISCONTINUED | OUTPATIENT
Start: 2018-08-24 | End: 2018-08-26 | Stop reason: HOSPADM

## 2018-08-24 RX ORDER — FUROSEMIDE 10 MG/ML
40 INJECTION INTRAMUSCULAR; INTRAVENOUS ONCE
Status: COMPLETED | OUTPATIENT
Start: 2018-08-24 | End: 2018-08-24

## 2018-08-24 RX ORDER — CARVEDILOL 6.25 MG/1
6.25 TABLET ORAL 2 TIMES DAILY WITH MEALS
Status: DISCONTINUED | OUTPATIENT
Start: 2018-08-24 | End: 2018-08-26 | Stop reason: HOSPADM

## 2018-08-24 RX ORDER — ACETAMINOPHEN 325 MG/1
650 TABLET ORAL EVERY 6 HOURS PRN
Status: DISCONTINUED | OUTPATIENT
Start: 2018-08-24 | End: 2018-08-26 | Stop reason: HOSPADM

## 2018-08-24 RX ORDER — CITALOPRAM 20 MG/1
10 TABLET ORAL DAILY
Status: DISCONTINUED | OUTPATIENT
Start: 2018-08-24 | End: 2018-08-26 | Stop reason: HOSPADM

## 2018-08-24 RX ORDER — ISOSORBIDE MONONITRATE 60 MG/1
60 TABLET, EXTENDED RELEASE ORAL DAILY
Status: DISCONTINUED | OUTPATIENT
Start: 2018-08-24 | End: 2018-08-25

## 2018-08-24 RX ORDER — ONDANSETRON 2 MG/ML
4 INJECTION INTRAMUSCULAR; INTRAVENOUS EVERY 6 HOURS PRN
Status: DISCONTINUED | OUTPATIENT
Start: 2018-08-24 | End: 2018-08-26 | Stop reason: HOSPADM

## 2018-08-24 RX ORDER — FUROSEMIDE 10 MG/ML
40 INJECTION INTRAMUSCULAR; INTRAVENOUS
Status: DISCONTINUED | OUTPATIENT
Start: 2018-08-24 | End: 2018-08-25

## 2018-08-24 RX ORDER — AMLODIPINE BESYLATE 2.5 MG/1
2.5 TABLET ORAL DAILY
Status: DISCONTINUED | OUTPATIENT
Start: 2018-08-24 | End: 2018-08-26

## 2018-08-24 RX ADMIN — FUROSEMIDE 40 MG: 10 INJECTION, SOLUTION INTRAMUSCULAR; INTRAVENOUS at 18:38

## 2018-08-24 RX ADMIN — APIXABAN 2.5 MG: 2.5 TABLET, FILM COATED ORAL at 18:38

## 2018-08-24 RX ADMIN — CARVEDILOL 6.25 MG: 6.25 TABLET, FILM COATED ORAL at 16:14

## 2018-08-24 RX ADMIN — AMLODIPINE BESYLATE 2.5 MG: 2.5 TABLET ORAL at 16:13

## 2018-08-24 RX ADMIN — FUROSEMIDE 40 MG: 10 INJECTION, SOLUTION INTRAMUSCULAR; INTRAVENOUS at 12:29

## 2018-08-24 RX ADMIN — ISOSORBIDE MONONITRATE 60 MG: 60 TABLET, EXTENDED RELEASE ORAL at 16:16

## 2018-08-24 RX ADMIN — CITALOPRAM HYDROBROMIDE 10 MG: 20 TABLET ORAL at 16:15

## 2018-08-24 NOTE — CONSULTS
Consultation - Cardiology  [facility] [address] 80 y o  male MRN: [id number]  Unit/Bed#: -01 Encounter: [id number]    Consults    Physician Requesting Consult: Moris Espino MD  Reason for Consult / Principal Problem: CHF    HPI: Cardiologist Dr Escobeod Zaida is a 80y o  year old male who has a history of   CAD S/ P CABG x4, Afib on Eliquis, hypertension, Congestive heart failure, COPD, hepatitis-C, CVA /TIA, CKD 4 presenting with episode of shortness of breath  Patient states that breathing has been getting progressively worse over the past week, has also noticed some leg edema  Symptoms accompanied by weakness  Recently hospitalized for hematuria and patient's Eliquis was held at that time  Denies chest pain, lightheadedness, palpitations, syncope  REVIEW OF SYSTEMS:  Constitutional: +fatigue  Denies fever or chills   Eyes:  Denies change in visual acuity   HENT:  Denies nasal congestion or sore throat   Respiratory:  +SOB  Cardiovascular:  +b/l leg edema   Denies chest pain   GI:  Denies abdominal pain, nausea, vomiting, bloody stools or diarrhea   :  Denies dysuria, frequency, difficulty in micturition and nocturia  Musculoskeletal:  Denies back pain or joint pain   Neurologic:  Denies headache, focal weakness or sensory changes   Endocrine:  Denies polyuria or polydipsia   Lymphatic:  Denies swollen glands   Psychiatric:  Denies depression or anxiety     Historical Information   Past Medical History:   Diagnosis Date    [facility]     CAD (coronary artery disease)     Cancer ([facility] )     bladder    Cerebellar stroke, acute ([facility] ) 7/16/2017    CHF (congestive heart failure) (HCC)     Chronic kidney disease     COPD (chronic obstructive pulmonary disease) ([facility]     Hepatitis C     Hypertension     TIA (transient ischemic attack)      Past Surgical History:   Procedure Laterality Date    CAROTID ARTERY ANGIOPLASTY      COLONOSCOPY N/A 3/24/2017    Procedure: COLONOSCOPY;  Surgeon: Libia Holm MD;  Location: [facility]; Service:     CORONARY ARTERY BYPASS GRAFT      CYSTECTOMY      ESOPHAGOGASTRODUODENOSCOPY N/A 3/23/2017    Procedure: ESOPHAGOGASTRODUODENOSCOPY (EGD); Surgeon: Libia Holm MD;  Location: [facility]; Service:     EYE SURGERY      ILEO CONDUIT       History   Alcohol use Not on file     History   Drug use: Unknown     History   Smoking Status    Former Smoker    Quit date: 2/28/1973   Smokeless Tobacco    Former User     Family History:   Family History   Problem Relation Age of Onset    Coronary artery disease Mother        MEDS & ALLERGIES:  all current active meds have been reviewed and current meds:   Current Facility-Administered Medications   Medication Dose Route Frequency    acetaminophen (TYLENOL) tablet 650 mg  650 mg Oral Q6H PRN    albuterol (PROVENTIL HFA,VENTOLIN HFA) inhaler 2 puff  2 puff Inhalation Q6H PRN    amLODIPine (NORVASC) tablet 2 5 mg  2 5 mg Oral Daily    apixaban (ELIQUIS) tablet 2 5 mg  2 5 mg Oral BID    carvedilol (COREG) tablet 6 25 mg  6 25 mg Oral BID With Meals    citalopram (CeleXA) tablet 10 mg  10 mg Oral Daily    furosemide (LASIX) injection 40 mg  40 mg Intravenous BID (diuretic)    isosorbide mononitrate (IMDUR) 24 hr tablet 60 mg  60 mg Oral Daily    ondansetron (ZOFRAN) injection 4 mg  4 mg Intravenous Q6H PRN        No Known Allergies    OBJECTIVE:  Vitals:   Vitals:    08/24/18 1338   BP: 120/85   Pulse: 73   Resp: 19   Temp:    SpO2:      Body mass index is 24 19 kg/m²      Systolic (30XVV), RMM:182 , Min:120 , PFV:333     Diastolic (57AJU), FTE:87, Min:85, Max:86      Intake/Output Summary (Last 24 hours) at 08/24/18 1450  Last data filed at 08/24/18 1313   Gross per 24 hour   Intake                0 ml   Output              500 ml   Net             -500 ml     Weight (last 2 days)     Date/Time   Weight    08/24/18 1338  74 3 (163 8)    08/24/18 1058  71 2 (156 97) Invasive Devices     Peripheral Intravenous Line            Peripheral IV 08/24/18 Right Antecubital less than 1 day          Drain            Urostomy Ileal conduit RLQ -- days                PHYSICAL EXAMS:  General:  Patient is not in acute distress, laying in the bed comfortably, awake, alert responding to commands  Head: Normocephalic, Atraumatic  HEENT:  Both pupils normal-size atraumatic, normocephalic, nonicteric  Neck:  +JVD  Krupa Mort Trachea central  Respiratory:  Bronchovascular breathing all over the chest without any accompaniment  Cardiovascular:  +irregularly irregular  +murmur  No rubs, gallops  GI:  Abdomen soft nontender   Liver and spleen normal size  Musculoskeletal:  +1 edema  Integument:  No skin rashes or ulceration  Lymphatic:  No cervical or inguinal lymphadenopathy  Neurologic:  Patient is awake alert, responding to command, well-oriented to time and place and person    LABORATORY RESULTS:    Results from last 7 days  Lab Units 08/24/18  1120   TROPONIN I ng/mL 0 04     CBC with diff:   Results from last 7 days  Lab Units 08/24/18  1120   WBC Thousand/uL 4 75   HEMOGLOBIN g/dL 11 1*   HEMATOCRIT % 36 0*   MCV fL 85   PLATELETS Thousands/uL 145*   MCH pg 26 2*   MCHC g/dL 30 8*   RDW % 15 5*   MPV fL 11 1   NRBC AUTO /100 WBCs 0       CMP:  Results from last 7 days  Lab Units 08/24/18  1120   SODIUM mmol/L 136   POTASSIUM mmol/L 4 2   CHLORIDE mmol/L 103   CO2 mmol/L 24   ANION GAP mmol/L 9   BUN mg/dL 34*   CREATININE mg/dL 2 55*   GLUCOSE RANDOM mg/dL 116   CALCIUM mg/dL 8 8   AST U/L 17   ALT U/L 17   ALK PHOS U/L 92   TOTAL PROTEIN g/dL 8 1   BILIRUBIN TOTAL mg/dL 0 90   EGFR ml/min/1 73sq m 22       BMP:  Results from last 7 days  Lab Units 08/24/18  1120   SODIUM mmol/L 136   POTASSIUM mmol/L 4 2   CHLORIDE mmol/L 103   CO2 mmol/L 24   BUN mg/dL 34*   CREATININE mg/dL 2 55*   GLUCOSE RANDOM mg/dL 116   CALCIUM mg/dL 8 8         Results from last 7 days  Lab Units 08/24/18  1120   NT-PRO BNP pg/mL 21,281*        Results from last 7 days  Lab Units 18  1120   MAGNESIUM mg/dL 2 1               Results from last 7 days  Lab Units 18  1120   INR  1 68*       Lipid Profile:   No results found for: CHOL  Lab Results   Component Value Date    HDL 48 2017     Lab Results   Component Value Date    LDLCALC 79 2017     Lab Results   Component Value Date    TRIG 72 2017       Cardiac testing:   Results for orders placed during the hospital encounter of 17   Echo complete with contrast if indicated    Narrative [address] [place], [place] [id number] [phone]    Transthoracic Echocardiogram  2D, M-mode, Doppler, and Color Doppler    Study date:  2017    Patient: Torri Duval  MR number: [id number]  Account number: [de-identified]  : 1930  Age: 80 years  Gender: Male  Status: Inpatient  Location: Bedside  Height: 68 in  Weight: 181 9 lb  BP: 153/ 78 mmHg    Indications: Heart failure  Diagnoses: I50 9 - Heart failure, unspecified    Sonographer:  Hernandez RCS  Interpreting Physician:  Christian Diggs MD  Referring Physician:  Orlando Flowers MD  Group:  [facility]    SUMMARY    LEFT VENTRICLE:  Ejection fraction was estimated to be 45 %  There was of the basal-mid inferolateral wall(s)  LEFT ATRIUM:  The atrium was markedly dilated  MITRAL VALVE:  There was moderate to severe regurgitation  AORTIC VALVE:  There was mild regurgitation  TRICUSPID VALVE:  There was trace regurgitation  IVC, HEPATIC VEINS:  The inferior vena cava was dilated  HISTORY: PRIOR HISTORY: A Fib  Congestive heart failure  Peripheral vascular disease  Risk factors: hypertension  PROCEDURE: The procedure was performed at the bedside  This was a routine study  The transthoracic approach was used  The study included complete 2D imaging, M-mode, complete spectral Doppler, and color Doppler  The heart rate was 71 bpm,  at the start of the study  Images were obtained from the parasternal, apical, subcostal, and suprasternal notch acoustic windows  Image quality was adequate  LEFT VENTRICLE: Ejection fraction was estimated to be 45 %  There was of the basal-mid inferolateral wall(s)  RIGHT VENTRICLE: The size was normal  Systolic function was normal  Wall thickness was normal     LEFT ATRIUM: The atrium was markedly dilated  RIGHT ATRIUM: Size was normal     MITRAL VALVE: DOPPLER: There was moderate to severe regurgitation  AORTIC VALVE: Leaflets exhibited mild calcification  DOPPLER: There was mild regurgitation  TRICUSPID VALVE: DOPPLER: There was trace regurgitation  Estimated peak PA pressure was 35 mmHg  PULMONIC VALVE: Leaflets exhibited normal thickness, no calcification, and normal cuspal separation  DOPPLER: The transpulmonic velocity was within the normal range  There was no regurgitation  PERICARDIUM: There was no pericardial effusion  The pericardium was normal in appearance  AORTA: The root exhibited normal size  SYSTEMIC VEINS: IVC: The inferior vena cava was dilated  SYSTEM MEASUREMENT TABLES    2D  Ao Diam: 2 8 cm  EF Biplane: 55 1 %  LA Area: 21 7 cm2  LA Diam: 4 8 cm  LVEDV MOD A2C: 157 ml  LVEDV MOD A4C: 140 9 ml  LVEDV MOD BP: 150 9 ml  LVEF MOD A2C: 42 3 %  LVEF MOD A4C: 67 2 %  LVESV MOD A2C: 90 6 ml  LVESV MOD A4C: 46 2 ml  LVESV MOD BP: 67 7 ml  LVLd A2C: 9 2 cm  LVLd A4C: 8 8 cm  LVLs A2C: 8 2 cm  LVLs A4C: 7 2 cm  RA Area: 14 8 cm2  RVIDd: 3 7 cm  SV MOD A2C: 66 4 ml  SV MOD A4C: 94 8 ml    CW  AV Env  Ti: 319 7 ms  AV VTI: 28 cm  AV Vmax: 1 4 m/s  AV Vmean: 0 9 m/s  AV maxP 7 mmHg  AV meanPG: 3 7 mmHg  TR Vmax: 2 9 m/s  TR maxP 2 mmHg    MM  TAPSE: 1 8 cm    PW  E': 0 1 m/s  E/E': 15 9  MV A Ishan: 0 4 m/s  MV Dec Burke: 5 6 m/s2  MV DecT: 187 4 ms  MV E Ishan: 1 m/s  MV E/A Ratio: 2 6  MV PHT: 54 4 ms  MVA By PHT: 4 cm2    [employer] Accredited Echocardiography Laboratory    Prepared and electronically signed by    Pepito Pablo MD  Signed [id number] 13:45:57         Imaging: I have personally reviewed pertinent reports  Assessment/Plan:  1  Acute on chronic systolic CHF, ischemic cardiomyopathy:  -  Patient appears volume overloaded  - BNP 11541  CT chest shows moderate left and small right later in pleural effusions  - echo ordered, will assess EF and regional wall motion abnormalities  Previous EF 45%, with moderate to severe MR  - creatinine 2 55, continue IV Lasix 40 mg b i d   - continue beta-blocker  No ACEI / ARB given elevated creatinine  - low-salt diet, daily weights, I/O    2  Paroxysmal atrial fibrillation:  - had episode of hematuria, however Eliquis has been restarted  Continue Eliquis for anticoagulation  Continue Coreg for rate control  3   Mitral regurgitation:  -  Moderate to severe MR per last echo  Will reassess via echocardiogram this admission  4   CAD S/ P CABG x4:  -  No anginal symptoms  Continue Coreg, Imdur  Not on aspirin as patient is on Eliquis  Intolerant to statins  5   History of CVA/TIA:   No aspirin as patient is on Eliquis  Intolerant to statins  6   Hypertension:  Stable, continue present regimen    7  Hyperlipidemia:  Intolerant to statins  Code Status: Level 3 - DNAR and DNI    Thank you for allowing us to participate in this patient's care  Counseling / Coordination of Care  Total floor / unit time spent today 35 minutes  Greater than 50% of total time was spent with the patient and / or family counseling and / or coordination of care  A description of the counseling / coordination of care: Review of history, current assessment, development of a plan      Blaise Beasley PA-C  8/24/2018,2:50 PM    Portions of the record may have been created with voice recognition software   Occasional wrong word or "sound a like" substitutions may have occurred due to the inherent limitations of voice recognition software   Read the chart carefully and recognize, using context, where substitutions have occurred

## 2018-08-24 NOTE — ASSESSMENT & PLAN NOTE
Acute renal failure with chronic kidney disease  Continue to monitor closely as stated  Diurese the patient  Nephrology evaluation

## 2018-08-24 NOTE — H&P
H&P- Kp Hall 6/21/1930, 80 y o  male MRN: [id number]    Unit/Bed#: -01 Encounter: [id number]    Primary Care Provider: Julián Moise MD   Date and time admitted to hospital: 8/24/2018 10:54 AM        Chronic kidney disease (CKD), stage IV (severe) ([facility] )   Assessment & Plan    Continue with diuretics and monitor closely  Nephrology evaluation        CAD (coronary artery disease)   Assessment & Plan    Continue Coreg and Imdur        Cerebellar stroke, acute [facility])   Assessment & Plan    Continue with anticoagulation        History of ileal conduit   Assessment & Plan    Patient has a history of ileal conduit  Family was concerned that he has urinary tract infection but he had not had fevers  Will get a procalcitonin        Atrial fibrillation [facility])   Assessment & Plan    Patient is on Eliquis  The patient was taken off last time secondary to GI bleed but he was put on by his cardiologist again  Continue monitor closely  There is no evidence of any bleeding  JUSTINE (acute kidney injury) [facility])   Assessment & Plan    Acute renal failure with chronic kidney disease  Continue to monitor closely as stated  Diurese the patient  Nephrology evaluation  * Acute systolic (congestive) heart failure ([facility] )   Assessment & Plan    Continue with diuresis  Continue monitor closely  The patient does have a history of chronic kidney disease stage 4  Cardiology evaluation  Check another echocardiogram             VTE Prophylaxis: Apixaban (Eliquis)  / sequential compression device   Code Status:  DNR level 3  POLST: POLST is not applicable to this patient  Discussion with famil the daughter at the bedside    Anticipated Length of Stay:  Patient will be admitted on an Inpatient basis with an anticipated length of stay of  greater than 2 midnights     Justification for Hospital Stay:  Patient is going to need greater than 2 midnights secondary to having acute congestive heart failure with chronic kidney disease in need of IV diuresis and close monitoring    Total Time for Visit, including Counseling / Coordination of Care: 1 hour  Greater than 50% of this total time spent on direct patient counseling and coordination of care  Chief Complaint:   Shortness of breath    History of Present Illness:    Maxi Montilla is a 80 y o  male who presents with shortness of breath  Patient has been having shortness of breath for the past week getting progressively worse  Patient has also noticed some edema  They thought initially because of his weakness and shortness of breath that he might have urinary tract infection  The patient does have a history of congestive heart failure  He denies any chest pain but gets intermittent pressure  He also complains of not being able to sleep because he feels like he can't breathe when he sleeps  Denies any nausea vomiting or any other recent illnesses       Review of Systems:    Review of Systems   Constitutional: Positive for activity change, appetite change, fatigue and unexpected weight change  Respiratory: Positive for shortness of breath  Cardiovascular: Positive for leg swelling  Neurological: Positive for weakness  All other systems reviewed and are negative  Past Medical and Surgical History:     Past Medical History:   Diagnosis Date    [facility] fibrillation ([facility] )     CAD (coronary artery disease)     Cancer ([facility] )     bladder    Cerebellar stroke, acute ([facility] ) 7/16/2017    CHF (congestive heart failure) (HCC)     Chronic kidney disease     COPD (chronic obstructive pulmonary disease) (HCC)     Hepatitis C     Hypertension     TIA (transient ischemic attack)        Past Surgical History:   Procedure Laterality Date    CAROTID ARTERY ANGIOPLASTY      COLONOSCOPY N/A 3/24/2017    Procedure: COLONOSCOPY;  Surgeon: Devonte Benton MD;  Location: [facility];   Service:     CORONARY ARTERY BYPASS GRAFT      CYSTECTOMY      ESOPHAGOGASTRODUODENOSCOPY N/A 3/23/2017    Procedure: ESOPHAGOGASTRODUODENOSCOPY (EGD); Surgeon: Hannah Youngblood MD;  Location: [facility]; Service:    Della Thacker EYE SURGERY      ILEO CONDUIT         Meds/Allergies:    Prior to Admission medications    Medication Sig Start Date End Date Taking? Authorizing Provider   amLODIPine (NORVASC) 5 mg tablet Take 1 tablet by mouth daily  Patient taking differently: Take 2 5 mg by mouth daily   7/17/17  Yes Ezekiel Baeza MD   carvedilol (COREG) 6 25 mg tablet Take 6 25 mg by mouth 2 (two) times a day with meals   Yes Historical Provider, MD   furosemide (LASIX) 40 mg tablet Take 40 mg by mouth daily as needed     Yes Historical Provider, MD   isosorbide mononitrate (IMDUR) 60 mg 24 hr tablet Take 60 mg by mouth daily   Yes Historical Provider, MD   albuterol (PROVENTIL HFA,VENTOLIN HFA) 90 mcg/act inhaler Inhale 2 puffs every 6 (six) hours as needed for wheezing    Historical Provider, MD     I have reviewed home medications with patient personally      Allergies: No Known Allergies    Social History:     Marital Status: /Civil Union   Occupation:  Retired  Patient Pre-hospital Living Situation:  Lives with family  Patient Pre-hospital Level of Mobility:  Independent  Patient Pre-hospital Diet Restrictions:  None  Substance Use History:   History   Alcohol use Not on file     History   Smoking Status    Former Smoker    Quit date: 2/28/1973   Smokeless Tobacco    Former User     History   Drug use: Unknown       Family History:    Family History   Problem Relation Age of Onset    Coronary artery disease Mother        Physical Exam:     Vitals:   Blood Pressure: 120/85 (08/24/18 1338)  Pulse: 73 (08/24/18 1338)  Temperature: 97 6 °F (36 4 °C) (08/24/18 1058)  Temp Source: Oral (08/24/18 1058)  Respirations: 19 (08/24/18 1338)  Height: 5' 9" (175 3 cm) (08/24/18 1338)  Weight - Scale: 74 3 kg (163 lb 12 8 oz) (08/24/18 1338)  SpO2: 95 % (Patient states that he feels short of breath and requested that we put O2 on him  Patient placed on 2 liters at this time) (08/24/18 1336)    Physical Exam    (   General Appearance:    Alert, cooperative, no distress, appears stated age   Head:    Normocephalic, without obvious abnormality, atraumatic   Eyes:    PERRL, conjunctiva/corneas clear, EOM's intact,             Nose:   Nares normal, septum midline, mucosa normal   Throat:   Lips, mucosa, and tongue normal; teeth and gums normal   Neck:   Supple, symmetrical, no adenopathy;        thyroid:  No enlargement/tenderness/nodules; no carotid    bruit or JVD   Back:     Symmetric, no curvature, ROM normal, no CVA tenderness   Lungs:    Crackles at the bases       Heart:    Regular rate and rhythm, S1 and S2 normal, no murmur, rub    or gallop   Abdomen:     Soft, non-tender, bowel sounds active all four quadrants,     no masses, no organomegaly           Extremities:   Trace edema   Pulses:   2+ and symmetric all extremities   Skin:   Skin color, texture, turgor normal, no rashes or lesions   Lymph nodes:   Cervical, supraclavicular, and axillary nodes normal   Neurologic:   CNII-XII intact  Normal strength, sensation and reflexes       throughout   )    Additional Data:     Lab Results: I have personally reviewed pertinent reports          Results from last 7 days  Lab Units 08/24/18  1120   WBC Thousand/uL 4 75   HEMOGLOBIN g/dL 11 1*   HEMATOCRIT % 36 0*   PLATELETS Thousands/uL 145*   NEUTROS PCT % 70   LYMPHS PCT % 18   MONOS PCT % 9   EOS PCT % 2       Results from last 7 days  Lab Units 08/24/18  1120   SODIUM mmol/L 136   POTASSIUM mmol/L 4 2   CHLORIDE mmol/L 103   CO2 mmol/L 24   BUN mg/dL 34*   CREATININE mg/dL 2 55*   CALCIUM mg/dL 8 8   TOTAL PROTEIN g/dL 8 1   BILIRUBIN TOTAL mg/dL 0 90   ALK PHOS U/L 92   ALT U/L 17   AST U/L 17   GLUCOSE RANDOM mg/dL 116       Results from last 7 days  Lab Units 08/24/18  1120   INR  1 68*               Imaging: I have personally reviewed pertinent reports  CT head without contrast   Final Result by Prasanna Connolly DO (08/24 1303)   Stable cerebral atrophy with chronic small vessel ischemic white matter disease and chronic infarctions  No acute intracranial abnormality  Workstation performed: [id number]         CT chest abdomen pelvis wo contrast   Final Result by Bladimir Mahan MD (08/24 0985)      CT chest:      Moderate left and small right layering pleural effusions  This has enlarged on the right  Chronic bibasilar and lingular left greater than right relaxation atelectasis  Chronic interstitial lung disease and mild upper lobe emphysema  Cardiomegaly  No pericardial effusion  Old calcified granulomatous disease  Borderline enlarged mediastinal lymph nodes likely related to chronic granulomatous disease  This could be followed up with noncontrast chest CT in 3 months  CT abdomen and pelvis:      Trace nonspecific presacral free fluid  New small left supraumbilical hernia containing a loop of unobstructed small bowel  Otherwise, no acute intra-abdominal or intrapelvic process insofar as can be detected on a noncontrast CT or significant interval change  Cholelithiasis  Polycystic kidney disease  No perinephric hemorrhage  Colonic diverticulosis  The examination demonstrates a significant  finding and was documented as such in [facility] for liaison and referring practitioner notification  Workstation performed: [id number]             ·     [employer] / [employer] Records Reviewed: Yes     ** Please Note: This note has been constructed using a voice recognition system   **

## 2018-08-24 NOTE — ASSESSMENT & PLAN NOTE
Continue with diuresis  Continue monitor closely  The patient does have a history of chronic kidney disease stage 4  Cardiology evaluation    Check another echocardiogram

## 2018-08-24 NOTE — ASSESSMENT & PLAN NOTE
Patient has a history of ileal conduit  Family was concerned that he has urinary tract infection but he had not had fevers    Will get a procalcitonin

## 2018-08-24 NOTE — ED PROVIDER NOTES
History  Chief Complaint   Patient presents with    Weakness - Generalized     Patient c/o generalized weakness and sob  Patient is an 80-year-old male with past medical and surgical history significant for bladder cancer status post cystectomy, chemotherapy, radiation and has a current ileal conduit/urostomy, atrial fibrillation on Eliquis, coronary artery disease status post CABG, prior cerebellar stroke, congestive heart failure, stage 4 kidney disease, hypertension, hepatitis-C, COPD on home oxygen PRN, presents to the emergency department with his wife for generalized weakness, intermittent confusion, dyspnea and chest pain  Wife states for the past 2 weeks he has been getting progressively more weak and patient reports he feels as though the weakness is getting much worse to the point where he cannot perform his ADLs  Patient also reports he has not been sleeping well at night  He has been taking Tylenol p m  with minimal relief  He also reports that he feels more short of breath than normal and wife has noticed this to  He has had a productive cough of yellow sputum  Patient also thinks that he may have urinary tract infection as he has had foul-smelling urine coming from his urostomy bag  He also complains of intermittent right groin pain that is sharp in nature and comes and goes  Patient states for the last 2 days he has had intermittent left-sided chest pain as well  Wife is concerned he may have fluid in lungs  Wife also states for the past several weeks he has been persistently dizzy  He has also had intermittent palpitations  Patient denies any recent falls but has come close to falling    He denies any fever, shaking chills, significant headache, vertigo, URI symptoms, neck pain or stiffness, diaphoresis, hemoptysis, back pain, flank pain, dyspnea at rest, abdominal pain or distention, nausea, vomiting, diarrhea, constipation, hematuria, skin rash or color change, lateralizing weakness, extremity paresthesia or other focal neurologic deficits  History provided by:  Patient and spouse   used: No        Prior to Admission Medications   Prescriptions Last Dose Informant Patient Reported? Taking? albuterol (PROVENTIL HFA,VENTOLIN HFA) 90 mcg/act inhaler  Self Yes No   Sig: Inhale 2 puffs every 6 (six) hours as needed for wheezing   amLODIPine (NORVASC) 5 mg tablet  Self No No   Sig: Take 1 tablet by mouth daily   Patient taking differently: Take 2 5 mg by mouth daily     carvedilol (COREG) 6 25 mg tablet  Self Yes No   Sig: Take 6 25 mg by mouth 2 (two) times a day with meals   furosemide (LASIX) 40 mg tablet  Self Yes No   Sig: Take 40 mg by mouth daily as needed     isosorbide mononitrate (IMDUR) 60 mg 24 hr tablet  Self Yes No   Sig: Take 60 mg by mouth daily      Facility-Administered Medications: None       Past Medical History:   Diagnosis Date    Atrial fibrillation (HCC)     CAD (coronary artery disease)     Cancer (HCC)     bladder    [facility]    CHF (congestive heart failure) (HCC)     Chronic kidney disease     COPD (chronic obstructive pulmonary disease) (HCC)     Hepatitis C     Hypertension     TIA (transient ischemic attack)        Past Surgical History:   Procedure Laterality Date    CAROTID ARTERY ANGIOPLASTY      COLONOSCOPY N/A 3/24/2017    Procedure: COLONOSCOPY;  Surgeon: Khang Diaz MD;  Location: [facility]; Service:     CORONARY ARTERY BYPASS GRAFT      CYSTECTOMY      ESOPHAGOGASTRODUODENOSCOPY N/A 3/23/2017    Procedure: ESOPHAGOGASTRODUODENOSCOPY (EGD); Surgeon: Khang Diaz MD;  Location: [facility]; Service:     EYE SURGERY      ILEO CONDUIT         Family History   Problem Relation Age of Onset    Coronary artery disease Mother      I have reviewed and agree with the history as documented      Social History   Substance Use Topics    Smoking status: Former Smoker     Quit date: 2/28/1973    Smokeless tobacco: Former User    Alcohol use No        Review of Systems   Constitutional: Negative for chills, diaphoresis and fever  HENT: Negative for congestion, ear pain, rhinorrhea and sore throat  Eyes: Negative for photophobia, pain and visual disturbance  Respiratory: Positive for cough and shortness of breath  Negative for chest tightness and wheezing  Cardiovascular: Positive for chest pain and palpitations  Negative for leg swelling  Gastrointestinal: Negative for abdominal pain, blood in stool, constipation, diarrhea, nausea and vomiting  Genitourinary: Negative for dysuria, flank pain, frequency and hematuria         + Foul-smelling urine   Musculoskeletal: Negative for back pain, neck pain and neck stiffness  Skin: Negative for color change, pallor, rash and wound  Allergic/Immunologic: Negative for immunocompromised state  Neurological: Positive for weakness and light-headedness  Negative for dizziness, syncope, facial asymmetry, speech difficulty, numbness and headaches  Hematological: Negative for adenopathy  Bruises/bleeds easily  Psychiatric/Behavioral: Positive for confusion  Negative for decreased concentration  All other systems reviewed and are negative  Physical Exam  Physical Exam   Constitutional: He is oriented to person, place, and time  He appears well-developed and well-nourished  No distress  HENT:   Head: Normocephalic and atraumatic  Mouth/Throat: Oropharynx is clear and moist  No oropharyngeal exudate  Eyes: Conjunctivae and EOM are normal  Pupils are equal, round, and reactive to light  Neck: Normal range of motion  Neck supple  No JVD present  Cardiovascular: Normal rate, regular rhythm and intact distal pulses  Exam reveals no gallop and no friction rub  Murmur heard  Pulmonary/Chest: Effort normal  No respiratory distress  He has no wheezes  He has no rales  Decreased breath sounds left base     Abdominal: Soft  Bowel sounds are normal  He exhibits no distension  There is no tenderness  There is no rebound and no guarding  Skin around urostomy bag appears normal  No surrounding erythema or warmth  Urine in urostomy bag dark yellow in color  Musculoskeletal: Normal range of motion  He exhibits no edema or tenderness  Lymphadenopathy:     He has no cervical adenopathy  Neurological: He is alert and oriented to person, place, and time  No cranial nerve deficit  No gross motor or sensory deficits  Skin: Skin is warm and dry  No rash noted  He is not diaphoretic  No pallor  Psychiatric: He has a normal mood and affect  His behavior is normal    Nursing note and vitals reviewed  Vital Signs  ED Triage Vitals   Temperature Pulse Respirations Blood Pressure SpO2   08/24/18 1058 08/24/18 1058 08/24/18 1058 08/24/18 1101 08/24/18 1058   97 6 °F (36 4 °C) 75 20 128/86 98 %      Temp Source Heart Rate Source Patient Position - Orthostatic VS BP Location FiO2 (%)   08/24/18 1058 08/24/18 1058 08/24/18 1058 08/24/18 1058 --   Oral Monitor Sitting Right arm       Pain Score       --                Vitals:    08/24/18 1058 08/24/18 1101   BP:  128/86   Pulse: 75    Resp: 20    Temp: 97 6 °F (36 4 °C)    TempSrc: Oral    SpO2: 98% 96%   Weight: 71 2 kg (156 lb 15 5 oz)    Height: 5' 8" (1 727 m)      Visual Acuity      ED Medications  Medications   furosemide (LASIX) injection 40 mg (40 mg Intravenous Given 8/24/18 1229)       Diagnostic Studies  Results Reviewed     Procedure Component Value Units Date/Time    Troponin I [[id number]]     Lab Status:  No result Specimen:  Blood     Lactic acid, plasma [[id number]]  (Normal) Collected:  08/24/18 1149    Lab Status:  Final result Specimen:  Blood from Arm, Right Updated:  08/24/18 1214     LACTIC ACID 1 1 mmol/L     Narrative:         Result may be elevated if tourniquet was used during collection      Magnesium [[id number]]  (Normal) Collected:  08/24/18 1120    Lab Status:  Final result Specimen:  Blood from Arm, Right Updated:  08/24/18 1204     Magnesium 2 1 mg/dL     B-type natriuretic peptide [[id number]]  (Abnormal) Collected:  08/24/18 1120    Lab Status:  Final result Specimen:  Blood from Arm, Right Updated:  08/24/18 1151     NT-proBNP 21,281 (H) pg/mL     Troponin I [[id number]]  (Normal) Collected:  08/24/18 1120    Lab Status:  Final result Specimen:  Blood from Arm, Right Updated:  08/24/18 1147     Troponin I 0 04 ng/mL     Protime-INR [[id number]]  (Abnormal) Collected:  08/24/18 1120    Lab Status:  Final result Specimen:  Blood from Arm, Right Updated:  08/24/18 1146     Protime 19 6 (H) seconds      INR 1 68 (H)    APTT [[id number]]  (Abnormal) Collected:  08/24/18 1120    Lab Status:  Final result Specimen:  Blood from Arm, Right Updated:  08/24/18 1146     PTT 44 (H) seconds     Comprehensive metabolic panel [[id number]]  (Abnormal) Collected:  08/24/18 1120    Lab Status:  Final result Specimen:  Blood from Arm, Right Updated:  08/24/18 1145     Sodium 136 mmol/L      Potassium 4 2 mmol/L      Chloride 103 mmol/L      CO2 24 mmol/L      Anion Gap 9 mmol/L      BUN 34 (H) mg/dL      Creatinine 2 55 (H) mg/dL      Glucose 116 mg/dL      Calcium 8 8 mg/dL      AST 17 U/L      ALT 17 U/L      Alkaline Phosphatase 92 U/L      Total Protein 8 1 g/dL      Albumin 2 9 (L) g/dL      Total Bilirubin 0 90 mg/dL      eGFR 22 ml/min/1 73sq m     Narrative:         National Kidney Disease Education Program recommendations are as follows:  GFR calculation is accurate only with a steady state creatinine  Chronic Kidney disease less than 60 ml/min/1 73 sq  meters  Kidney failure less than 15 ml/min/1 73 sq  meters      CBC and differential [[id number]]  (Abnormal) Collected:  08/24/18 1120    Lab Status:  Final result Specimen:  Blood from Arm, Right Updated:  08/24/18 1130     WBC 4 75 Thousand/uL      RBC 4 23 Million/uL      Hemoglobin 11 1 (L) g/dL      Hematocrit 36 0 (L) % MCV 85 fL      MCH 26 2 (L) pg      MCHC 30 8 (L) g/dL      RDW 15 5 (H) %      MPV 11 1 fL      Platelets 254 (L) Thousands/uL      nRBC 0 /100 WBCs      Neutrophils Relative 70 %      Immat GRANS % 0 %      Lymphocytes Relative 18 %      Monocytes Relative 9 %      Eosinophils Relative 2 %      Basophils Relative 1 %      Neutrophils Absolute 3 28 Thousands/µL      Immature Grans Absolute 0 01 Thousand/uL      Lymphocytes Absolute 0 87 Thousands/µL      Monocytes Absolute 0 44 Thousand/µL      Eosinophils Absolute 0 11 Thousand/µL      Basophils Absolute 0 04 Thousands/µL                  CT head without contrast   Final Result by Haris Larkin DO (08/24 1303)   Stable cerebral atrophy with chronic small vessel ischemic white matter disease and chronic infarctions  No acute intracranial abnormality  Workstation performed: [id number]         CT chest abdomen pelvis wo contrast   Final Result by Steven Hutchins MD (08/24 7267)      CT chest:      Moderate left and small right layering pleural effusions  This has enlarged on the right  Chronic bibasilar and lingular left greater than right relaxation atelectasis  Chronic interstitial lung disease and mild upper lobe emphysema  Cardiomegaly  No pericardial effusion  Old calcified granulomatous disease  Borderline enlarged mediastinal lymph nodes likely related to chronic granulomatous disease  This could be followed up with noncontrast chest CT in 3 months  CT abdomen and pelvis:      Trace nonspecific presacral free fluid  New small left supraumbilical hernia containing a loop of unobstructed small bowel  Otherwise, no acute intra-abdominal or intrapelvic process insofar as can be detected on a noncontrast CT or significant interval change  Cholelithiasis  Polycystic kidney disease  No perinephric hemorrhage  Colonic diverticulosis        The examination demonstrates a significant finding and was documented as such in [facility] for liaison and referring practitioner notification  Workstation performed: [id number]                    Procedures  ECG 12 Lead Documentation  Date/Time: 8/24/2018 11:49 AM  Performed by: Tino Cardoso  Authorized by: Tino Cardoso     ECG reviewed by me, the ED Provider: yes    Patient location:  ED  Previous ECG:     Previous ECG:  Compared to current    Comparison ECG info:  7-  Rate:     ECG rate:  93    ECG rate assessment: normal    Rhythm:     Rhythm: atrial fibrillation    Ectopy:     Ectopy: PVCs      PVCs:  Infrequent  QRS:     QRS axis:  Right    QRS intervals: Wide  Conduction:     Conduction: abnormal      Abnormal conduction: complete RBBB, LPFB and bifascicular block    Other findings:     Other findings: prolonged qTc interval             Phone Contacts  ED Phone Contact    ED Course  ED Course as of Aug 24 1325   Fri Aug 24, 2018   1154 Acutely elevated  NT-proBNP: (!) 21,281   1154 Slightly worse than baseline  Creatinine: (!) 2 55           Identification of Seniors at Risk      Most Recent Value   (ISAR) Identification of Seniors at Risk   Before the illness or injury that brought you to the Emergency, did you need someone to help you on a regular basis? 0 Filed at: 08/24/2018 1100   In the last 24 hours, have you needed more help than usual?  1 Filed at: 08/24/2018 1100   Have you been hospitalized for one or more nights during the past 6 months? 1 Filed at: 08/24/2018 1100   In general, do you see well?  0 Filed at: 08/24/2018 1100   In general, do you have serious problems with your memory? 0 Filed at: 08/24/2018 1100   Do you take more than three different medications every day?   1 Filed at: 08/24/2018 1100   ISAR Score  3 Filed at: 08/24/2018 1100                          [facility]  CritCare Time    Disposition  Final diagnoses:   Acute exacerbation of CHF (congestive heart failure) (HCC)   Dyspnea   Generalized weakness Dizziness   Intermittent left-sided chest pain     Time reflects when diagnosis was documented in both MDM as applicable and the Disposition within this note     Time User Action Codes Description Comment    8/24/2018 12:20 PM Robert Denisse E Add [I50 9] Acute exacerbation of CHF (congestive heart failure) ([facility] [address] )     8/24/2018 12:20 PM Robert Denisse E Add [R06 00] Dyspnea     8/24/2018 12:20 PM Robert Denisse E Add [R53 1] Generalized weakness     8/24/2018 12:20 PM Robert Denisse E Add [R42] Dizziness     8/24/2018 12:22 PM Robert Denisse E Add [R07 89] Intermittent left-sided chest pain       ED Disposition     ED Disposition Condition Comment    Admit  Case was discussed with KAREN and the patient's admission status was agreed to be Admission Status: inpatient status to the service of Dr Nicolas Fraire   Follow-up Information    None         Patient's Medications   Discharge Prescriptions    No medications on file     No discharge procedures on file      ED Provider  Electronically Signed by           Carolyn Dhillon DO  08/24/18 6578

## 2018-08-24 NOTE — ASSESSMENT & PLAN NOTE
Patient is on Eliquis  The patient was taken off last time secondary to GI bleed but he was put on by his cardiologist again  Continue monitor closely  There is no evidence of any bleeding

## 2018-08-24 NOTE — PLAN OF CARE
CARDIOVASCULAR - ADULT     Maintains optimal cardiac output and hemodynamic stability Progressing     Absence of cardiac dysrhythmias or at baseline rhythm Progressing        DISCHARGE PLANNING     Discharge to home or other facility with appropriate resources Progressing        GENITOURINARY - ADULT     Maintains or returns to baseline urinary function Progressing     Absence of urinary retention Progressing     Urinary catheter remains patent Progressing        Knowledge Deficit     Patient/family/caregiver demonstrates understanding of disease process, treatment plan, medications, and discharge instructions Progressing        Nutrition/Hydration-ADULT     Nutrient/Hydration intake appropriate for improving, restoring or maintaining nutritional needs Progressing        PAIN - ADULT     Verbalizes/displays adequate comfort level or baseline comfort level Progressing        Potential for Falls     Patient will remain free of falls Progressing        RESPIRATORY - ADULT     Achieves optimal ventilation and oxygenation Progressing        SAFETY ADULT     Maintain or return to baseline ADL function Progressing     Maintain or return mobility status to optimal level Progressing        SKIN/TISSUE INTEGRITY - ADULT     Skin integrity remains intact Progressing

## 2018-08-25 PROBLEM — Z86.73 HISTORY OF CEREBELLAR STROKE: Status: ACTIVE | Noted: 2017-07-16

## 2018-08-25 LAB
ANION GAP SERPL CALCULATED.3IONS-SCNC: 8 MMOL/L (ref 4–13)
BUN SERPL-MCNC: 35 MG/DL (ref 5–25)
CALCIUM SERPL-MCNC: 8.7 MG/DL (ref 8.3–10.1)
CHLORIDE SERPL-SCNC: 106 MMOL/L (ref 100–108)
CO2 SERPL-SCNC: 25 MMOL/L (ref 21–32)
CREAT SERPL-MCNC: 2.52 MG/DL (ref 0.6–1.3)
GFR SERPL CREATININE-BSD FRML MDRD: 22 ML/MIN/1.73SQ M
GLUCOSE SERPL-MCNC: 84 MG/DL (ref 65–140)
MAGNESIUM SERPL-MCNC: 2 MG/DL (ref 1.6–2.6)
POTASSIUM SERPL-SCNC: 3.6 MMOL/L (ref 3.5–5.3)
PROCALCITONIN SERPL-MCNC: 0.05 NG/ML
SODIUM SERPL-SCNC: 139 MMOL/L (ref 136–145)

## 2018-08-25 PROCEDURE — 83735 ASSAY OF MAGNESIUM: CPT | Performed by: FAMILY MEDICINE

## 2018-08-25 PROCEDURE — 99233 SBSQ HOSP IP/OBS HIGH 50: CPT | Performed by: NURSE PRACTITIONER

## 2018-08-25 PROCEDURE — 80048 BASIC METABOLIC PNL TOTAL CA: CPT | Performed by: FAMILY MEDICINE

## 2018-08-25 PROCEDURE — 99232 SBSQ HOSP IP/OBS MODERATE 35: CPT | Performed by: INTERNAL MEDICINE

## 2018-08-25 RX ORDER — FUROSEMIDE 40 MG/1
40 TABLET ORAL
Status: DISCONTINUED | OUTPATIENT
Start: 2018-08-25 | End: 2018-08-26 | Stop reason: HOSPADM

## 2018-08-25 RX ADMIN — FUROSEMIDE 40 MG: 10 INJECTION, SOLUTION INTRAMUSCULAR; INTRAVENOUS at 08:53

## 2018-08-25 RX ADMIN — CARVEDILOL 6.25 MG: 6.25 TABLET, FILM COATED ORAL at 08:51

## 2018-08-25 RX ADMIN — APIXABAN 2.5 MG: 2.5 TABLET, FILM COATED ORAL at 08:51

## 2018-08-25 RX ADMIN — AMLODIPINE BESYLATE 2.5 MG: 2.5 TABLET ORAL at 08:52

## 2018-08-25 RX ADMIN — APIXABAN 2.5 MG: 2.5 TABLET, FILM COATED ORAL at 17:34

## 2018-08-25 RX ADMIN — CITALOPRAM HYDROBROMIDE 10 MG: 20 TABLET ORAL at 08:52

## 2018-08-25 NOTE — CASE MANAGEMENT
Initial Clinical Review    Admission: Date/Time/Statement: 8/24/18 @ 1222     Orders Placed This Encounter   Procedures    Inpatient Admission (expected length of stay for this patient is greater than two midnights)     Standing Status:   Standing     Number of Occurrences:   1     Order Specific Question:   Admitting Physician     Answer:   Valerie Martin     Order Specific Question:   Level of Care     Answer:   Med Surg [16]     Order Specific Question:   Bed request comments     Answer:   tele     Order Specific Question:   Estimated length of stay     Answer:   More than 2 Midnights     Order Specific Question:   Certification     Answer:   I certify that inpatient services are medically necessary for this patient for a duration of greater than two midnights  See H&P and MD Progress Notes for additional information about the patient's course of treatment  ED: Date/Time/Mode of Arrival:   ED Arrival Information     Expected Arrival Acuity Means of Arrival Escorted By Service Admission Type    - 8/24/2018 10:53 Urgent Walk-In Spouse General Medicine Urgent    Arrival Complaint    WEAKNESS          Chief Complaint:   Chief Complaint   Patient presents with    Weakness - Generalized     Patient c/o generalized weakness and sob  History of Illness: 70-year-old male with past medical and surgical history significant for bladder cancer status post cystectomy, chemotherapy, radiation and has a current ileal conduit/urostomy, atrial fibrillation on Eliquis, coronary artery disease status post CABG, prior cerebellar stroke, congestive heart failure, stage 4 kidney disease, hypertension, hepatitis-C, COPD on home oxygen PRN, presents to the emergency department with his wife for generalized weakness, intermittent confusion, dyspnea and chest pain    Wife states for the past 2 weeks he has been getting progressively more weak and patient reports he feels as though the weakness is getting much worse to the point where he cannot perform his ADLs  Patient also reports he has not been sleeping well at night  He has been taking Tylenol p m  with minimal relief  He also reports that he feels more short of breath than normal and wife has noticed this to  He has had a productive cough of yellow sputum  Patient also thinks that he may have urinary tract infection as he has had foul-smelling urine coming from his urostomy bag  He also complains of intermittent right groin pain that is sharp in nature and comes and goes  Patient states for the last 2 days he has had intermittent left-sided chest pain as well  Wife is concerned he may have fluid in lungs  Wife also states for the past several weeks he has been persistently dizzy  He has also had intermittent palpitations  Patient denies any recent falls but has come close to falling  ED Vital Signs:   ED Triage Vitals   Temperature Pulse Respirations Blood Pressure SpO2   08/24/18 1058 08/24/18 1058 08/24/18 1058 08/24/18 1101 08/24/18 1058   97 6 °F (36 4 °C) 75 20 128/86 98 %      Temp Source Heart Rate Source Patient Position - Orthostatic VS BP Location FiO2 (%)   08/24/18 1058 08/24/18 1058 08/24/18 1058 08/24/18 1058 --   Oral Monitor Sitting Right arm       Pain Score       08/24/18 1414       No Pain        Wt Readings from Last 1 Encounters:   08/25/18 71 2 kg (156 lb 15 5 oz)     O2 2L N/C    Vital Signs (abnormal):  WNL    Abnormal Labs:   BUN 5 - 25 mg/dL 34     Creatinine 0 60 - 1 30 mg/dL 2 55        08/24/18 1120    NT-proBNP <450 pg/mL 21,281       08/24/18 1120     WBC 4 31 - 10 16 Thousand/uL 4 75    RBC 3 88 - 5 62 Million/uL 4 23    Hemoglobin 12 0 - 17 0 g/dL 11 1     Hematocrit 36 5 - 49 3 % 36 0     MCV 82 - 98 fL 85    MCH 26 8 - 34 3 pg 26 2     MCHC 31 4 - 37 4 g/dL 30 8     RDW 11 6 - 15 1 % 15 5     MPV 8 9 - 12 7 fL 11 1    Platelets 199 - 661 Thousands/uL 145         Diagnostic Test Results:  CT chest:   Moderate left and small right layering pleural effusions  This has enlarged on the right    Chronic bibasilar and lingular left greater than right relaxation atelectasis    Chronic interstitial lung disease and mild upper lobe emphysema    Cardiomegaly  No pericardial effusion    Old calcified granulomatous disease    Borderline enlarged mediastinal lymph nodes likely related to chronic granulomatous disease  This could be followed up with noncontrast chest CT in 3 months      CT abdomen and pelvis:   Trace nonspecific presacral free fluid    New small left supraumbilical hernia containing a loop of unobstructed small bowel    Otherwise, no acute intra-abdominal or intrapelvic process insofar as can be detected on a noncontrast CT or significant interval change    Cholelithiasis    Polycystic kidney disease  No perinephric hemorrhage    Colonic diverticulosis  ED Treatment:   Medication Administration from 08/24/2018 1053 to 08/24/2018 1354       Date/Time Order Dose Route Action     08/24/2018 1229 furosemide (LASIX) injection 40 mg 40 mg Intravenous Given          Past Medical/Surgical History:    Active Ambulatory Problems     Diagnosis Date Noted    Elevated troponin 02/19/2017    JUSTINE [facility]acute kidney [facility]) [facility] [address] ) 02/19/2017    H/O carotid angioplasty 02/19/2017    HTN (hypertension) 02/19/2017    History of CHF (congestive heart failure) 02/21/2017    Melena 03/22/2017    SOB (shortness of breath) 03/22/2017    Dizziness 03/22/2017    [facility]congestive heart failure) ([facility]) 03/22/2017    Atrial fibrillation ([facility]) 03/22/2017    CKD (chronic kidney disease) stage 4, GFR 15-29 ml/min ([facility]) 03/22/2017    Anemia 03/23/2017    Hypertensive CKD (chronic kidney disease) 03/23/2017    History of ileal conduit 07/13/2017    Generalized weakness 07/13/2017    Urinary tract infection 07/13/2017    History of cerebellar stroke 07/16/2017    Hematuria 07/11/2018    History of CVA (cerebrovascular accident) 07/11/2018    CAD (coronary artery disease) 07/11/2018    Malignant neoplasm of urinary bladder ([facility] ) 07/16/2018     Resolved Ambulatory Problems     Diagnosis Date Noted    Acute exacerbation of CHF (congestive heart failure) ([place] [address] ) 02/19/2017    Dizziness 07/13/2017     Past Medical History:   Diagnosis Date    Atrial fibrillation ([place] [address] )     CAD (coronary artery disease)     Cancer ([place] [address] )     Cerebellar stroke, acute ([place] [address] ) 7/16/2017    [facility])     Chronic kidney disease     COPD (chronic obstructive pulmonary disease) (HCC)     Hepatitis C     Hypertension     TIA (transient ischemic attack)        Admitting Diagnosis: Dizziness [R42]  Weakness [R53 1]  Dyspnea [R06 00]  Acute exacerbation of CHF (congestive heart failure) ([facility]) [I50 9]  Generalized weakness [R53 1]  Intermittent left-sided chest pain [R07 89]    Age/Sex: 80 y o  male    Assessment/Plan:   Chronic kidney disease (CKD), stage IV (severe) ([facility])   Assessment & Plan     Continue with diuretics and monitor closely  Nephrology evaluation          CAD (coronary artery disease)   Assessment & Plan     Continue Coreg and Imdur          Cerebellar stroke, acute ([place] [address] )   Assessment & Plan     Continue with anticoagulation          History of ileal conduit   Assessment & Plan     Patient has a history of ileal conduit  Family was concerned that he has urinary tract infection but he had not had fevers  Will get a procalcitonin          Atrial fibrillation [facility])   Assessment & Plan     Patient is on Eliquis  The patient was taken off last time secondary to GI bleed but he was put on by his cardiologist again  Continue monitor closely  There is no evidence of any bleeding           JUSTINE (acute kidney injury) ([place] [address] )   Assessment & Plan     Acute renal failure with chronic kidney disease  Continue to monitor closely as stated  Diurese the patient    Nephrology evaluation           * Acute systolic (congestive) heart failure ([place] [address] ) Assessment & Plan     Continue with diuresis  Continue monitor closely  The patient does have a history of chronic kidney disease stage 4  Cardiology evaluation  Check another echocardiogram                 VTE Prophylaxis: Apixaban (Eliquis)  / sequential compression device   Code Status:  DNR level 3  POLST: POLST is not applicable to this patient  Discussion with famil the daughter at the bedside     Anticipated Length of Stay:  Patient will be admitted on an Inpatient basis with an anticipated length of stay of  greater than 2 midnights  Justification for Hospital Stay:  Patient is going to need greater than 2 midnights secondary to having acute congestive heart failure with chronic kidney disease in need of IV diuresis and close monitoring      Admission Orders:  Echo  Tele monitoring  Cardiology cons  Nephrology cons    Scheduled Meds:   Current Facility-Administered Medications:  amLODIPine 2 5 mg Oral Daily   apixaban 2 5 mg Oral BID   carvedilol 6 25 mg Oral BID With Meals   citalopram 10 mg Oral Daily   furosemide 40 mg Oral BID (diuretic)     Continuous Infusions:    PRN Meds:   acetaminophen    albuterol    ondansetron    ---------------------------------------------------------------------------------------------------------------    8/24 Cardiology cons:  Assessment/Plan:  1  Acute on chronic systolic CHF, ischemic cardiomyopathy:  -  Patient appears volume overloaded  - BNP 28811  CT chest shows moderate left and small right later in pleural effusions  - echo ordered, will assess EF and regional wall motion abnormalities  Previous EF 45%, with moderate to severe MR  - creatinine 2 55, continue IV Lasix 40 mg b i d   - continue beta-blocker  No ACEI / ARB given elevated creatinine  - low-salt diet, daily weights, I/O     2   Paroxysmal atrial fibrillation:  - had episode of hematuria, however Eliquis has been restarted  Continue Eliquis for anticoagulation    Continue Coreg for rate control      3  Mitral regurgitation:  -  Moderate to severe MR per last echo  Will reassess via echocardiogram this admission      4   CAD S/ P CABG x4:  -  No anginal symptoms  Continue Coreg, Imdur  Not on aspirin as patient is on Eliquis  Intolerant to statins      5  History of CVA/TIA:   No aspirin as patient is on Eliquis  Intolerant to statins      6  Hypertension:  Stable, continue present regimen     7  Hyperlipidemia:  Intolerant to statins     ----------------------------------------------------------------------------------------------------------    8/24 Nephrology cons:  PLAN / RECOMMENDATIONS       CKD stage 4:  Due to cardiorenal syndrome  Fluctuating based on cardiac output and diuretic    Seems to be stable at this point     Shortness of breath:  Due to volume overload status at present sleeping peacefully suggesting responded to diuretic     Bladder cancer status post ileal conduit stable     Cardiomyopathy:  Will continue diuretic and will monitor     Anemia:  Seems to be stable

## 2018-08-25 NOTE — ASSESSMENT & PLAN NOTE
· POA evident by progressive SOB x 1 week and elevated BNP >44481  · Discussion with Cardiology at the bedside transition patient to oral Lasix today if volume status improved likely discharge home tomorrow  Also patient has no chest pain is symptomatic dizziness do not see a need for the isosorbide    · Continue I/O, daily weight, 1500 mL fluid restriction, and low sodium diet  · Echocardiogram showing LVEF 40-45% inferior and lateral walls appear hypokinetic  Right ventricle has a peak pressure of at least 44 mmHg  Severe to moderate mitral valve regurgitation  And bilateral atrium are dilated  · Cardiology following ongoing recommendations appreciated

## 2018-08-25 NOTE — CONSULTS
Consultation - Nephrology   Dexter Whiting 80 y o  male MRN: [id number]  Unit/Bed#: -01 Encounter: [id number]    Referring PHYSICIAN: Oscar uHa     REASON FOR THE CONSULTATION:  Acute on chronic renal failure    DATE OF CONSULTATION:  August 25, 2018    ADMISSION DIAGNOSIS: Acute systolic (congestive) heart failure ([facility] [address] )     CHIEF COMPLAINT     Patient admitted the hospital with worsening shortness of breath and has advanced kidney disease so I am being consulted    HPI     Patient is quite tired and sleepy so cannot give much of the history though he is arousable so history which I got is talking to him as well as from the chart  Apparently he was not doing well for last week or so with worsening shortness of breath and some dizziness  He claims his medicine was changed recently at [place] and that was making it worse  He denies chest pain does have palpitation though does appear short of breath, no nausea no vomiting no discomfort, does appear quite tired and weak  Does not sleep well    Patient does have advanced kidney disease partly because of cardiorenal syndrome as well as ileal conduit  He has been seen by me and other nephrologist in the past    PAST MEDICAL HISTORY     Past Medical History:   Diagnosis Date    Atrial fibrillation ([facility] [address] )     CAD (coronary artery disease)     Cancer ([facility] [address] )     bladder    [facility], [facility] 7/16/2017    CHF (congestive heart failure) (HCC)     Chronic kidney disease     COPD (chronic obstructive pulmonary disease) (HCC)     Hepatitis C     Hypertension     TIA (transient ischemic attack)        PAST SURGICAL HISTORY     Past Surgical History:   Procedure Laterality Date    CAROTID ARTERY ANGIOPLASTY      COLONOSCOPY N/A 3/24/2017    Procedure: COLONOSCOPY;  Surgeon: Basim Mims MD;  Location: [facility];   Service:     CORONARY ARTERY BYPASS GRAFT      CYSTECTOMY      ESOPHAGOGASTRODUODENOSCOPY N/A 3/23/2017    Procedure: ESOPHAGOGASTRODUODENOSCOPY (EGD); Surgeon: Rebel Wei MD;  Location: [facility]; Service:     EYE SURGERY      ILEO CONDUIT         ALLERGIES     No Known Allergies    SOCIAL HISTORY     History   Alcohol Use No     History   Drug Use No     History   Smoking Status    Former Smoker    Quit date: 2/28/1973   Smokeless Tobacco    Former User       FAMILY HISTORY     Family History   Problem Relation Age of Onset    Coronary artery disease Mother        CURRENT MEDICATIONS       Current Facility-Administered Medications:     acetaminophen (TYLENOL) tablet 650 mg, 650 mg, Oral, Q6H PRN, Alexandra Pereira MD    albuterol (PROVENTIL HFA,VENTOLIN HFA) inhaler 2 puff, 2 puff, Inhalation, Q6H PRN, Alexandra Pereira MD    amLODIPine (NORVASC) tablet 2 5 mg, 2 5 mg, Oral, Daily, Alexandra Pereira MD, 2 5 mg at 08/25/18 1161    apixaban (ELIQUIS) tablet 2 5 mg, 2 5 mg, Oral, BID, Alexandra Pereira MD, 2 5 mg at 08/25/18 0851    carvedilol (COREG) tablet 6 25 mg, 6 25 mg, Oral, BID With Meals, Alexandra Pereira MD, 6 25 mg at 08/25/18 0851    citalopram (CeleXA) tablet 10 mg, 10 mg, Oral, Daily, Alexandra Pereira MD, 10 mg at 08/25/18 1149    furosemide (LASIX) tablet 40 mg, 40 mg, Oral, BID (diuretic), JESSENIA Newton    ondansetron (ZOFRAN) injection 4 mg, 4 mg, Intravenous, Q6H PRN, Alexandra Pereira MD    REVIEW OF SYSTEMS     Review of Systems   Constitutional: Positive for appetite change and fatigue  Negative for activity change  HENT: Positive for hearing loss  Negative for congestion, ear discharge, rhinorrhea, sinus pain and sinus pressure  Eyes: Negative for photophobia, pain, discharge and visual disturbance  Respiratory: Positive for shortness of breath  Negative for apnea, choking, chest tightness, wheezing and stridor  Cardiovascular: Positive for palpitations  Negative for chest pain and leg swelling  Gastrointestinal: Positive for nausea   Negative for abdominal distention, abdominal pain, blood in stool, constipation, diarrhea and vomiting  Endocrine: Negative for heat intolerance and polyphagia  Genitourinary: Negative for decreased urine volume, difficulty urinating, flank pain and urgency  Musculoskeletal: Positive for back pain and gait problem  Negative for neck pain and neck stiffness  Skin: Negative for color change and wound  Allergic/Immunologic: Negative for food allergies and immunocompromised state  Neurological: Positive for speech difficulty, weakness and light-headedness  Negative for seizures and facial asymmetry  Hematological: Negative for adenopathy  Does not bruise/bleed easily  Psychiatric/Behavioral: Negative for self-injury and suicidal ideas  LAB RESULTS          Results from last 7 days  Lab Units 08/25/18  0438 08/24/18  1120   WBC Thousand/uL  --  4 75   HEMOGLOBIN g/dL  --  11 1*   HEMATOCRIT %  --  36 0*   PLATELETS Thousands/uL  --  145*   SODIUM mmol/L 139 136   POTASSIUM mmol/L 3 6 4 2   CHLORIDE mmol/L 106 103   CO2 mmol/L 25 24   BUN mg/dL 35* 34*   CREATININE mg/dL 2 52* 2 55*   EGFR ml/min/1 73sq m 22 22   CALCIUM mg/dL 8 7 8 8   MAGNESIUM mg/dL 2 0 2 1   TOTAL PROTEIN g/dL  --  8 1   GLUCOSE RANDOM mg/dL 84 116       I have personally reviewed the old medical records and patient's previously known baseline creatinine level is ~ 2 4    RADIOLOGY RESULTS     No results found for this or any previous visit  Results for orders placed during the hospital encounter of 07/11/18   XR chest pa & lateral    Narrative CHEST     INDICATION:   Blood loss  Labored breathing    COMPARISON:  7/13/2017    EXAM PERFORMED/VIEWS:  XR CHEST AP & LATERAL      FINDINGS:  There are median sternotomy wires indicating prior cardiac surgery  Heart shadow is mildly enlarged but unchanged from prior exam     [facility] left pleural effusion noted  This partially obscures visualization of the left base  Right upper lobe granuloma noted  Lungs otherwise clear      Osseous structures appear within normal limits for patient age  Impression Small to moderate-sized left pleural effusion        Workstation performed: [id number]       No results found for this or any previous visit  No results found for this or any previous visit  Results for orders placed during the hospital encounter of 07/11/18   CT abdomen pelvis wo contrast    Narrative CT ABDOMEN AND PELVIS WITHOUT IV CONTRAST    INDICATION:   Gross hematuria rule out pathology, elevated creatinine cannot tolerate contrast     COMPARISON: 07/10/2017    TECHNIQUE:  CT examination of the abdomen and pelvis was performed without intravenous contrast   Axial, sagittal, and coronal 2D reformatted images were created from the source data and submitted for interpretation  Radiation dose length product (DLP) for this visit:  631 mGy-cm   This examination, like all CT scans performed in the [facility], was performed utilizing techniques to minimize radiation dose exposure, including the use of iterative   reconstruction and automated exposure control  Enteric contrast was administered  FINDINGS:    ABDOMEN    LOWER CHEST: Large left-sided pleural effusion  Mild atelectasis at the right lung base  Atelectasis at the left lung base  LIVER/BILIARY TREE:  Unremarkable  GALLBLADDER:  Multiple gallstones in the gallbladder  SPLEEN:  Scattered splenic granulomata  PANCREAS:  Unremarkable  ADRENAL GLANDS:  Unremarkable  KIDNEYS/URETERS:  Multiple bilateral renal cysts  Scattered calcifications which appear to be related to the cysts  STOMACH AND BOWEL:  No obvious hydronephrosis although difficult to evaluate in the presence of multiple cysts  APPENDIX:  No findings to suggest appendicitis  ABDOMINOPELVIC CAVITY:  No ascites or free intraperitoneal air  No lymphadenopathy  VESSELS:  Unremarkable for patient's age      PELVIS    REPRODUCTIVE ORGANS:  Penile implant and reservoir in place     URINARY BLADDER:  A cystectomy has been performed  ABDOMINAL WALL/INGUINAL REGIONS:  Ileal loop in the right lower quadrant  OSSEOUS STRUCTURES:  Degenerative changes in the lumbar spine  1st degree anterolisthesis of L4 with respect to L5  Impression 1  Large left-sided pleural effusion  2   Bibasilar atelectasis  3   Cholelithiasis  4   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  5   Cystectomy and ileal loop  6  Penile implant and reservoir  Workstation performed: [id number]       No results found for this or any previous visit  OBJECTIVE     Current Weight: Weight - Scale: 71 2 kg (156 lb 15 5 oz)  Vitals:    08/25/18 0851   BP: 141/80   Pulse: 89   Resp:    Temp:    SpO2:        Intake/Output Summary (Last 24 hours) at 08/25/18 1126  Last data filed at 08/25/18 0851   Gross per 24 hour   Intake              490 ml   Output             3050 ml   Net            -2560 ml       PHYSICAL EXAMINATION     Physical Exam   Constitutional: He is oriented to person, place, and time  He appears well-developed  No distress  Chronically ill   HENT:   Head: Normocephalic  Mouth/Throat: Oropharynx is clear and moist    Eyes: Conjunctivae are normal  Pupils are equal, round, and reactive to light  No scleral icterus  Neck: Normal range of motion  Neck supple  No JVD present  Cardiovascular: Normal rate and regular rhythm  Murmur heard  Pulmonary/Chest: Effort normal  He has no wheezes  Decreased air entry at both bases more on right than left   Abdominal: Soft  He exhibits no mass  There is no tenderness  Positive for ileal conduit   Musculoskeletal: Normal range of motion  He exhibits no edema  Neurological: He is alert and oriented to person, place, and time  Skin: Skin is warm  No rash noted  Psychiatric: He has a normal mood and affect   His behavior is normal         PLAN / RECOMMENDATIONS      CKD stage 4:  Due to cardiorenal syndrome  Fluctuating based on cardiac output and diuretic  Seems to be stable at this point    Shortness of breath:  Due to volume overload status at present sleeping peacefully suggesting responded to diuretic    Bladder cancer status post ileal conduit stable    Cardiomyopathy:  Will continue diuretic and will monitor    Anemia:  Seems to be stable    Thank you for the consultation to participate in patient's care  I have personally discussed my plan with the referring physician  Rocky Segura MD  Nephrology  8/25/2018        Portions of the record may have been created with voice recognition software  Occasional wrong word or "sound a like" substitutions may have occurred due to the inherent limitations of voice recognition software  Read the chart carefully and recognize, using context, where substitutions have occurred

## 2018-08-25 NOTE — PROGRESS NOTES
Approx Axrf-Repxdetwo-Vocfh 78 made aware pt is cherelle on telemetry monitor 4-54  Pt asleep and easily arousable  Denies cx pain or discomfort  Resp even and unlabored  Rafal Peters visited bedside talkng to wife  NNO received  Will continue to monitor  No acute distress noted

## 2018-08-25 NOTE — ASSESSMENT & PLAN NOTE
· Rate controlled  · Continue Eliquis  · Agree no acute evidence of bleeding  · Monitor patient closely

## 2018-08-25 NOTE — ASSESSMENT & PLAN NOTE
· Discussion with Nephrology patient does not have an acute kidney injury patient likely has CKD stage 4 the fluctuating creatinine    · Creatinine currently stable at 2 52

## 2018-08-25 NOTE — PROGRESS NOTES
NEPHROLOGY PROGRESS NOTE    Patient: Alex Orozco               Sex: male          DOA: 8/24/2018 10:54 AM   YOB: 1930        Age:  80 y o         LOS:  LOS: 1 day       HPI     Patient admitted with shortness of breath    SUBJECTIVE     Feeling much better  More awake and alert  Denies any acute complaint  He thinks all his symptoms was secondary to changing in medication  CURRENT MEDICATIONS       Current Facility-Administered Medications:     acetaminophen (TYLENOL) tablet 650 mg, 650 mg, Oral, Q6H PRN, Guillermo Munroe MD    albuterol (PROVENTIL HFA,VENTOLIN HFA) inhaler 2 puff, 2 puff, Inhalation, Q6H PRN, Guillermo Munroe MD    amLODIPine (NORVASC) tablet 2 5 mg, 2 5 mg, Oral, Daily, Guillermo Munroe MD, 2 5 mg at 08/25/18 0973    apixaban (ELIQUIS) tablet 2 5 mg, 2 5 mg, Oral, BID, Guillermo Munroe MD, 2 5 mg at 08/25/18 0851    carvedilol (COREG) tablet 6 25 mg, 6 25 mg, Oral, BID With Meals, Guillermo Munroe MD, 6 25 mg at 08/25/18 0851    citalopram (CeleXA) tablet 10 mg, 10 mg, Oral, Daily, Guillermo Munroe MD, 10 mg at 08/25/18 2612    furosemide (LASIX) tablet 40 mg, 40 mg, Oral, BID (diuretic), JESSENIA Us    ondansetron [facility]) injection 4 mg, 4 mg, Intravenous, Q6H PRN, Guillermo Munroe MD    OBJECTIVE     Current Weight: Weight - Scale: 71 2 kg (156 lb 15 5 oz)  Vitals:    08/25/18 0851   BP: 141/80   Pulse: 89   Resp:    Temp:    SpO2:        Intake/Output Summary (Last 24 hours) at 08/25/18 1124  Last data filed at 08/25/18 0851   Gross per 24 hour   Intake              490 ml   Output             3050 ml   Net            -2560 ml       PHYSICAL EXAMINATION     Physical Exam   Constitutional: He is oriented to person, place, and time  He appears well-developed  No distress  Chronically ill   HENT:   Head: Normocephalic  Mouth/Throat: Oropharynx is clear and moist    Eyes: Conjunctivae are normal  Pupils are equal, round, and reactive to light  No scleral icterus     Neck: Normal range of motion  Neck supple  No JVD present  Cardiovascular: Normal rate and regular rhythm  Murmur heard  Pulmonary/Chest: Effort normal and breath sounds normal  No respiratory distress  He has no wheezes  Abdominal: Soft  Bowel sounds are normal  He exhibits no mass  There is no tenderness  Musculoskeletal: Normal range of motion  He exhibits no edema  Neurological: He is alert and oriented to person, place, and time  Skin: Skin is warm  No rash noted  Psychiatric: He has a normal mood and affect  His behavior is normal         LAB RESULTS       Results from last 7 days  Lab Units 08/25/18  0438 08/24/18  1120   WBC Thousand/uL  --  4 75   HEMOGLOBIN g/dL  --  11 1*   HEMATOCRIT %  --  36 0*   PLATELETS Thousands/uL  --  145*   SODIUM mmol/L 139 136   POTASSIUM mmol/L 3 6 4 2   CHLORIDE mmol/L 106 103   CO2 mmol/L 25 24   BUN mg/dL 35* 34*   CREATININE mg/dL 2 52* 2 55*   EGFR ml/min/1 73sq m 22 22   CALCIUM mg/dL 8 7 8 8   MAGNESIUM mg/dL 2 0 2 1   TOTAL PROTEIN g/dL  --  8 1   GLUCOSE RANDOM mg/dL 84 116       RADIOLOGY RESULTS      No results found for this or any previous visit  Results for orders placed during the hospital encounter of 07/11/18   XR chest pa & lateral    Narrative CHEST     INDICATION:   Blood loss  Labored breathing    COMPARISON:  7/13/2017    EXAM PERFORMED/VIEWS:  XR CHEST AP & LATERAL      FINDINGS:  There are median sternotomy wires indicating prior cardiac surgery  Heart shadow is mildly enlarged but unchanged from prior exam     [place] left pleural effusion noted  This partially obscures visualization of the left base  Right upper lobe granuloma noted  Lungs otherwise clear  Osseous structures appear within normal limits for patient age  Impression Small to moderate-sized left pleural effusion        Workstation performed: [id number]       No results found for this or any previous visit    No results found for this or any previous visit  Results for orders placed during the hospital encounter of 07/11/18   CT abdomen pelvis wo contrast    Narrative CT ABDOMEN AND PELVIS WITHOUT IV CONTRAST    INDICATION:   Gross hematuria rule out pathology, elevated creatinine cannot tolerate contrast     COMPARISON: 07/10/2017    TECHNIQUE:  CT examination of the abdomen and pelvis was performed without intravenous contrast   Axial, sagittal, and coronal 2D reformatted images were created from the source data and submitted for interpretation  Radiation dose length product (DLP) for this visit:  631 mGy-cm   This examination, like all CT scans performed in the [place], was performed utilizing techniques to minimize radiation dose exposure, including the use of iterative   reconstruction and automated exposure control  Enteric contrast was administered  FINDINGS:    ABDOMEN    LOWER CHEST: Large left-sided pleural effusion  Mild atelectasis at the right lung base  Atelectasis at the left lung base  LIVER/BILIARY TREE:  Unremarkable  GALLBLADDER:  Multiple gallstones in the gallbladder  SPLEEN:  Scattered splenic granulomata  PANCREAS:  Unremarkable  ADRENAL GLANDS:  Unremarkable  KIDNEYS/URETERS:  Multiple bilateral renal cysts  Scattered calcifications which appear to be related to the cysts  STOMACH AND BOWEL:  No obvious hydronephrosis although difficult to evaluate in the presence of multiple cysts  APPENDIX:  No findings to suggest appendicitis  ABDOMINOPELVIC CAVITY:  No ascites or free intraperitoneal air  No lymphadenopathy  VESSELS:  Unremarkable for patient's age  PELVIS    REPRODUCTIVE ORGANS:  Penile implant and reservoir in place  URINARY BLADDER:  A cystectomy has been performed  ABDOMINAL WALL/INGUINAL REGIONS:  Ileal loop in the right lower quadrant  OSSEOUS STRUCTURES:  Degenerative changes in the lumbar spine  1st degree anterolisthesis of L4 with respect to L5  Impression 1  Large left-sided pleural effusion  2   Bibasilar atelectasis  3   Cholelithiasis  4   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  5   Cystectomy and ileal loop  6  Penile implant and reservoir  Workstation performed: [id number]       No results found for this or any previous visit  PLAN / RECOMMENDATIONS      CKD stage 4:  Due to cardiorenal syndrome and seems to be at baseline    Bladder cancer:  Status post bladder removal and ileal conduit seems to be stable    Cardiorenal syndrome:  Stable at this point and quite asymptomatic    Will continue to monitor    Katlyn Alan MD  Nephrology  8/25/2018        Portions of the record may have been created with voice recognition software  Occasional wrong word or "sound a like" substitutions may have occurred due to the inherent limitations of voice recognition software  Read the chart carefully and recognize, using context, where substitutions have occurred

## 2018-08-25 NOTE — PROGRESS NOTES
General Cardiology   Progress Note   Alex Orozco 80 y o  male MRN: [id number]  Unit/Bed#: -01 Encounter: [id number]        Subjective:   Dyspnea is improved but he complains of dizziness    Objective:   Vitals:  Vitals:    08/25/18 0851   BP: 141/80   Pulse: 89   Resp:    Temp:    SpO2:        Body mass index is 23 18 kg/m²  Systolic (42FOO), ORJ:228 , Min:117 , KHF:794     Diastolic (41FEK), EFT:86, Min:64, Max:85      Intake/Output Summary (Last 24 hours) at 08/25/18 1113  Last data filed at 08/25/18 0555   Gross per 24 hour   Intake              150 ml   Output             3050 ml   Net            -2900 ml     Weight (last 2 days)     Date/Time   Weight    08/25/18 0555  71 2 (156 97)    08/24/18 1445  77 (169 75)    08/24/18 1338  74 3 (163 8)    08/24/18 1058  71 2 (156 97)              PHYSICAL EXAMS:  General:  Patient is not in acute distress, laying in the bed comfortably, awake, alert responding to commands  Head: Normocephalic, Atraumatic  HEENT: White sclera, pink conjunctiva,  PERRLA,pharynx benign  Neck:  Supple, JVD present carotids+2/+2 no bruits, thyromegaly, adenopathy  Respiratory: clear to P/A  Cardiovascular:  PMI normal, S1-S2 normal, 3/6 holosystolic murmur thrills, gallops, rubs   Regular rhythm  GI:  Abdomen soft nontender   No hepatosplenomegaly, adenopathy, ascites,or rebound tenderness  Extremities:  1+ edema, normal pulses, no calf tenderness, no joint deformities, no venous disease   Integument:  No skin rashes or ulceration  Lymphatic:  No cervical or inguinal lymphadenopathy  Neurologic:  Patient is awake alert, responding to command, well-oriented to time and place and person moving all extremities      LABORATORY RESULTS:    Results from last 7 days  Lab Units 08/24/18  1653 08/24/18  1120   TROPONIN I ng/mL 0 04 0 04     CBC with diff:   Results from last 7 days  Lab Units 08/24/18  1120   WBC Thousand/uL 4 75   HEMOGLOBIN g/dL 11 1*   HEMATOCRIT % 36 0*   MCV fL 85 PLATELETS Thousands/uL 145*   MCH pg 26 2*   MCHC g/dL 30 8*   RDW % 15 5*   MPV fL 11 1   NRBC AUTO /100 WBCs 0       CMP:  Results from last 7 days  Lab Units 18  0438 18  1120   SODIUM mmol/L 139 136   POTASSIUM mmol/L 3 6 4 2   CHLORIDE mmol/L 106 103   CO2 mmol/L 25 24   ANION GAP mmol/L 8 9   BUN mg/dL 35* 34*   CREATININE mg/dL 2 52* 2 55*   GLUCOSE RANDOM mg/dL 84 116   CALCIUM mg/dL 8 7 8 8   AST U/L  --  17   ALT U/L  --  17   ALK PHOS U/L  --  92   TOTAL PROTEIN g/dL  --  8 1   BILIRUBIN TOTAL mg/dL  --  0 90   EGFR ml/min/1 73sq m 22 22       BMP:  Results from last 7 days  Lab Units 18  0438 18  1120   SODIUM mmol/L 139 136   POTASSIUM mmol/L 3 6 4 2   CHLORIDE mmol/L 106 103   CO2 mmol/L 25 24   BUN mg/dL 35* 34*   CREATININE mg/dL 2 52* 2 55*   GLUCOSE RANDOM mg/dL 84 116   CALCIUM mg/dL 8 7 8 8         Results from last 7 days  Lab Units 18  1120   NT-PRO BNP pg/mL 21,281*        Results from last 7 days  Lab Units 18  0438 18  1120   MAGNESIUM mg/dL 2 0 2 1               Results from last 7 days  Lab Units 18  1120   INR  1 68*       Lipid Profile:   No results found for: CHOL  Lab Results   Component Value Date    HDL 48 2017     Lab Results   Component Value Date    LDLCALC 79 2017     Lab Results   Component Value Date    TRIG 72 2017       Cardiac testing:   Results for orders placed during the hospital encounter of 18   Echo complete with contrast if indicated    Narrative [address] [place] [id number] [phone]    Transthoracic Echocardiogram  2D, M-mode, Doppler, and Color Doppler    Study date:  24-Aug-2018    Patient: Angela Mallory  MR number: [id number]  Account number: [de-identified]  : 1930  Age: 80 years  Gender: Male  Status: Inpatient  Location: Bedside  Height: 69 in  Weight: 163 lb  BP: 120/ 85 mmHg    Indications: Heart Failure    Diagnoses: I50 9 - Heart failure, unspecified    Sonographer:  LEILA Payton,RDCS  Interpreting Physician:  Kareem Morales MD  Referring Physician:  Micha Tang PA-C  Group:  [facility]    SUMMARY    LEFT VENTRICLE:  Ejection fraction was estimated to be 40 % to 45%  Inferior and lateral walls appear hypokinetic  RIGHT VENTRICLE:  Estimated peak pressure was at least 45 mmHg  LEFT ATRIUM:  The atrium was mildly to moderately dilated  RIGHT ATRIUM:  The atrium was dilated  MITRAL VALVE:  There was moderate to severe regurgitation  Similar to echocardiogram from Feb 2017  AORTIC VALVE:  There was trace regurgitation  TRICUSPID VALVE:  There was mild regurgitation  IVC, HEPATIC VEINS:  The inferior vena cava was dilated  PERICARDIUM:  There was a moderate to large -sized left pleural effusion  HISTORY: PRIOR HISTORY: Elevated Troponins, Hypertension, Coronary Artery Disease, Acute Kidney Injury, History of Congestive Heart Failure, History of Cerebral Vascular Accident, Shortness of Breath, Dizziness, Atrial Fibrillation    PROCEDURE: The procedure was performed at the bedside  This was a routine study  The transthoracic approach was used  The study included complete 2D imaging, M-mode, complete spectral Doppler, and color Doppler  The heart rate was 89 bpm,  at the start of the study  Images were obtained from the parasternal, apical, subcostal, and suprasternal notch acoustic windows  Echocardiographic views were limited due to poor acoustic window availability, decreased penetration, and  lung interference  This was a technically difficult study  LEFT VENTRICLE: Size was normal  Ejection fraction was estimated to be 40 % to 45%  Inferior and lateral walls appear hypokinetic  DOPPLER: There was an increased relative contribution of atrial contraction to ventricular filling      RIGHT VENTRICLE: The size was normal  Systolic function was normal  Wall thickness was normal  DOPPLER: Estimated peak pressure was at least 45 mmHg  LEFT ATRIUM: The atrium was mildly to moderately dilated  RIGHT ATRIUM: The atrium was dilated  MITRAL VALVE: There was annular calcification  DOPPLER: There was moderate to severe regurgitation  Similar to echocardiogram from 2017  AORTIC VALVE: The valve was not well visualized  DOPPLER: There was trace regurgitation  TRICUSPID VALVE: The valve structure was normal  There was normal leaflet separation  DOPPLER: The transtricuspid velocity was within the normal range  There was no evidence for stenosis  There was mild regurgitation  PULMONIC VALVE: Leaflets exhibited normal thickness, no calcification, and normal cuspal separation  DOPPLER: The transpulmonic velocity was within the normal range  There was no regurgitation  PERICARDIUM: There was no pericardial effusion  There was a moderate to large -sized left pleural effusion  The pericardium was normal in appearance  AORTA: The root exhibited normal size  SYSTEMIC VEINS: IVC: The inferior vena cava was dilated      SYSTEM MEASUREMENT TABLES    2D  %FS: 8 2 %  Ao Diam: 3 5 cm  EDV(Teich): 107 4 ml  EF Biplane: 35 1 %  EF(Teich): 18 2 %  ESV(Teich): 87 9 ml  IVSd: 1 2 cm  LA Area: 30 9 cm2  LA Diam: 4 1 cm  LVEDV MOD A2C: 167 1 ml  LVEDV MOD A4C: 170 7 ml  LVEDV MOD BP: 169 2 ml  LVEF MOD A2C: 26 6 %  LVEF MOD A4C: 42 6 %  LVESV MOD A2C: 122 6 ml  LVESV MOD A4C: 98 1 ml  LVESV MOD BP: 109 7 ml  LVIDd: 4 8 cm  LVIDs: 4 4 cm  LVLd A2C: 9 7 cm  LVLd A4C: 9 8 cm  LVLs A2C: 9 3 cm  LVLs A4C: 9 4 cm  LVPWd: 1 cm  RA Area: 21 6 cm2  RVIDd: 3 3 cm  SV MOD A2C: 44 5 ml  SV MOD A4C: 72 6 ml  SV(Teich): 19 6 ml    CW  MR VTI: 161 2 cm  MR Vmax: 5 7 m/s  MR Vmean: 4 3 m/s  MR maxP 3 mmHg  MR meanP 1 mmHg  TR Vmax: 3 4 m/s  TR maxP 4 mmHg    MM  TAPSE: 1 6 cm    [employer] Accredited Echocardiography Laboratory    Prepared and electronically signed by    Janette Macedo MD  Signed 24-Aug-2018 18:53:36       No results found for this or any previous visit  No results found for this or any previous visit  No procedure found  No results found for this or any previous visit  Meds/Allergies   all current active meds have been reviewed  Prescriptions Prior to Admission   Medication    amLODIPine (NORVASC) 5 mg tablet    carvedilol (COREG) 6 25 mg tablet    furosemide (LASIX) 40 mg tablet    isosorbide mononitrate (IMDUR) 60 mg 24 hr tablet    albuterol (PROVENTIL HFA,VENTOLIN HFA) 90 mcg/act inhaler            Assessment/Plan:  1  Acute on chronic systolic CHF, ischemic cardiomyopathy:  LVEF 40-45%  -   volume status improved, still appears mildly volume overloaded  - continue IV Lasix 40 mg b i d   - continue beta-blocker  No ACEI / ARB given elevated creatinine  - low-salt diet, daily weights, I/O     2   Paroxysmal atrial fibrillation:  -   Continue Eliquis for anticoagulation  Continue Coreg for rate control      3  Mitral regurgitation:  Moderate to severe  Not the best candidate for surgery  He might be a candidate for mitral clip  -continue IV Lasix for now     4  CAD S/ P CABG x4:  -  No anginal symptoms  Continue Coreg  Not on aspirin as patient is on Eliquis  Intolerant to statins  Stop Imdur, he has orthostatic dizziness     5  History of CVA/TIA:   No aspirin as patient is on Eliquis  Intolerant to statins      6  Hypertension:  Stable, continue present regimen     7  Hyperlipidemia:  Intolerant to statins  Counseling / Coordination of Care  Total floor / unit time spent today 30 minutes  Greater than 50% of total time was spent with the patient and / or family counseling and / or coordination of care  ** Please Note: Dragon 360 Dictation voice to text software may have been used in the creation of this document   **

## 2018-08-25 NOTE — PHYSICIAN ADVISOR
Current patient class: Inpatient  The patient is currently on Hospital Day: 2 at [address]      The patient was admitted to the hospital at [phone] on 8/24/18 for the following diagnosis:  Dizziness [R42]  Weakness [R53 1]  Dyspnea [R06 00]  Acute exacerbation of CHF (congestive heart failure) (HCC) [I50 9]  Generalized weakness [R53 1]  Intermittent left-sided chest pain [R07 89]       There is documentation in the medical record of an expected length of stay of at least 2 midnights  The patient is therefore expected to satisfy the 2 midnight benchmark and given the 2 midnight presumption is appropriate for INPATIENT ADMISSION  Given this expectation of a satisfying stay, CMS instructs us that the patient is most often appropriate for inpatient admission under part A provided medical necessity is documented in the chart  After review of the relevant documentation, labs, vital signs and test results, the patient is appropriate for INPATIENT ADMISSION  Admission to the hospital as an inpatient is a complex decision making process which requires the practitioner to consider the patients presenting complaint, history and physical examination and all relevant testing  With this in mind, in this case, the patient was deemed appropriate for INPATIENT ADMISSION  After review of the documentation and testing available at the time of the admission I concur with this clinical determination of medical necessity  Rationale is as follows: The patient is a 80 yrs old Male who presented to the ED at 8/24/2018 10:54 AM with a chief complaint of Weakness - Generalized (Patient c/o generalized weakness and sob )     Given the need for further hospitalization, and along with the documentation of medical necessity present in the chart, the patient is appropriate for inpatient admission  The patient is expected to satisfy the 2 midnight benchmark, and will require further acute medical care   The patient does have comorbid conditions which increases the risk for significant adverse outcome  Given this the patient is appropriate for inpatient admission  The patients vitals on arrival were ED Triage Vitals   Temperature Pulse Respirations Blood Pressure SpO2   08/24/18 1058 08/24/18 1058 08/24/18 1058 08/24/18 1101 08/24/18 1058   97 6 °F (36 4 °C) 75 20 128/86 98 %      Temp Source Heart Rate Source Patient Position - Orthostatic VS BP Location FiO2 (%)   08/24/18 1058 08/24/18 1058 08/24/18 1058 08/24/18 1058 --   Oral Monitor Sitting Right arm       Pain Score       08/24/18 1414       No Pain           Past Medical History:   Diagnosis Date    Atrial fibrillation (HCC)     CAD (coronary artery disease)     Cancer (HCC)     bladder    Cerebellar stroke, acute ([facility] [address] ) 7/16/2017    CHF (congestive heart failure) (HCC)     Chronic kidney disease     COPD (chronic obstructive pulmonary disease) (HCC)     Hepatitis C     Hypertension     TIA (transient ischemic attack)      Past Surgical History:   Procedure Laterality Date    CAROTID ARTERY ANGIOPLASTY      COLONOSCOPY N/A 3/24/2017    Procedure: COLONOSCOPY;  Surgeon: Elsi Nicolas MD;  Location: [facility]; Service:     CORONARY ARTERY BYPASS GRAFT      CYSTECTOMY      ESOPHAGOGASTRODUODENOSCOPY N/A 3/23/2017    Procedure: ESOPHAGOGASTRODUODENOSCOPY (EGD); Surgeon: Elsi Nicolas MD;  Location: [facility];   Service:     EYE SURGERY      ILEO CONDUIT             Consults have been placed to:   IP CONSULT TO CARDIOLOGY  IP CONSULT TO NEPHROLOGY    Vitals:    08/25/18 0747 08/25/18 0851 08/25/18 1100 08/25/18 1500   BP: 133/82 141/80 93/58 100/62   BP Location: Left arm Left arm     Pulse: 90 89 60 67   Resp: 18   20   Temp: 97 7 °F (36 5 °C)  97 5 °F (36 4 °C) (!) 97 3 °F (36 3 °C)   TempSrc: Oral   Oral   SpO2: 95%  99% 99%   Weight:       Height:           Most recent labs:    Recent Labs      08/24/18   1120  08/24/18   1653 08/25/18   0438   WBC  4 75   --    --    HGB  11 1*   --    --    HCT  36 0*   --    --    PLT  145*   --    --    K  4 2   --   3 6   NA  136   --   139   CALCIUM  8 8   --   8 7   BUN  34*   --   35*   CREATININE  2 55*   --   2 52*   INR  1 68*   --    --    TROPONINI  0 04  0 04   --    AST  17   --    --    ALT  17   --    --    ALKPHOS  92   --    --    BILITOT  0 90   --    --        Scheduled Meds:  Current Facility-Administered Medications:  acetaminophen 650 mg Oral Q6H PRN Marco Antonio Willingham MD   albuterol 2 puff Inhalation Q6H PRN Marco Antonio Willingham MD   amLODIPine 2 5 mg Oral Daily Marco Antonio Willingham MD   apixaban 2 5 mg Oral BID Marco Antonio Willingham MD   carvedilol 6 25 mg Oral BID With Meals Marco Antonio Willingham MD   citalopram 10 mg Oral Daily Marco Antonio Willingham MD   furosemide 40 mg Oral BID (diuretic) Dominguez Deiters, CRNP   ondansetron 4 mg Intravenous Q6H PRN Marco Antonio Willingham MD     Continuous Infusions:   PRN Meds:   acetaminophen    albuterol    ondansetron    Surgical procedures (if appropriate):

## 2018-08-25 NOTE — ASSESSMENT & PLAN NOTE
· Patient has a history of ileal conduit  · Family verbalized concern for UTI   · Patient currently afebrile without leukocytosis and has a normal procalcitonin would not put patient on antibiotics at this time

## 2018-08-26 VITALS
SYSTOLIC BLOOD PRESSURE: 124 MMHG | DIASTOLIC BLOOD PRESSURE: 78 MMHG | RESPIRATION RATE: 18 BRPM | HEART RATE: 90 BPM | BODY MASS INDEX: 23.61 KG/M2 | WEIGHT: 159.39 LBS | HEIGHT: 69 IN | TEMPERATURE: 97.9 F | OXYGEN SATURATION: 97 %

## 2018-08-26 PROCEDURE — 99232 SBSQ HOSP IP/OBS MODERATE 35: CPT | Performed by: INTERNAL MEDICINE

## 2018-08-26 PROCEDURE — 99239 HOSP IP/OBS DSCHRG MGMT >30: CPT | Performed by: PHYSICIAN ASSISTANT

## 2018-08-26 RX ORDER — CITALOPRAM 10 MG/1
10 TABLET ORAL DAILY
Qty: 30 TABLET | Refills: 0 | Status: SHIPPED | OUTPATIENT
Start: 2018-08-26 | End: 2018-09-01 | Stop reason: HOSPADM

## 2018-08-26 RX ORDER — POTASSIUM CHLORIDE 20 MEQ/1
20 TABLET, EXTENDED RELEASE ORAL 2 TIMES DAILY
Qty: 30 TABLET | Refills: 0 | Status: SHIPPED | OUTPATIENT
Start: 2018-08-26

## 2018-08-26 RX ORDER — AMLODIPINE BESYLATE 5 MG/1
TABLET ORAL
Qty: 30 TABLET | Refills: 0
Start: 2018-08-26 | End: 2018-08-26 | Stop reason: HOSPADM

## 2018-08-26 RX ORDER — FUROSEMIDE 40 MG/1
40 TABLET ORAL 2 TIMES DAILY
Qty: 30 TABLET | Refills: 0 | Status: SHIPPED | OUTPATIENT
Start: 2018-08-26

## 2018-08-26 RX ADMIN — CITALOPRAM HYDROBROMIDE 10 MG: 20 TABLET ORAL at 09:00

## 2018-08-26 RX ADMIN — APIXABAN 2.5 MG: 2.5 TABLET, FILM COATED ORAL at 09:00

## 2018-08-26 RX ADMIN — FUROSEMIDE 40 MG: 40 TABLET ORAL at 09:00

## 2018-08-26 RX ADMIN — CARVEDILOL 6.25 MG: 6.25 TABLET, FILM COATED ORAL at 10:55

## 2018-08-26 NOTE — PROGRESS NOTES
NEPHROLOGY PROGRESS NOTE    Patient: Doris Schwartz               Sex: male          DOA: 8/24/2018 10:54 AM   YOB: 1930        Age:  80 y o         LOS:  LOS: 2 days       HPI     Patient admitted with volume overload status    SUBJECTIVE     Feeling much better  Eating well  No acute complaint    CURRENT MEDICATIONS       Current Facility-Administered Medications:     acetaminophen (TYLENOL) tablet 650 mg, 650 mg, Oral, Q6H PRN, Courtney Mcmanus MD    albuterol (PROVENTIL HFA,VENTOLIN HFA) inhaler 2 puff, 2 puff, Inhalation, Q6H PRN, Courtney Mcmanus MD    amLODIPine (NORVASC) tablet 2 5 mg, 2 5 mg, Oral, Daily, Courtney Mcmanus MD, 2 5 mg at 08/25/18 7325    apixaban (ELIQUIS) tablet 2 5 mg, 2 5 mg, Oral, BID, Courtney Mcmanus MD, 2 5 mg at 08/26/18 0900    carvedilol (COREG) tablet 6 25 mg, 6 25 mg, Oral, BID With Meals, Courtney Mcmanus MD, 6 25 mg at 08/25/18 0851    citalopram (CeleXA) tablet 10 mg, 10 mg, Oral, Daily, Courtney Mcmanus MD, 10 mg at 08/26/18 0900    furosemide (LASIX) tablet 40 mg, 40 mg, Oral, BID (diuretic), Pamla Collin, CRNP, 40 mg at 08/26/18 0900    ondansetron (ZOFRAN) injection 4 mg, 4 mg, Intravenous, Q6H PRN, Courtney Mcmanus MD    OBJECTIVE     Current Weight: Weight - Scale: 72 3 kg (159 lb 6 3 oz)  Vitals:    08/26/18 0700   BP: 131/84   Pulse: 90   Resp: 18   Temp: 97 9 °F (36 6 °C)   SpO2: 97%       Intake/Output Summary (Last 24 hours) at 08/26/18 0914  Last data filed at 08/26/18 0300   Gross per 24 hour   Intake                0 ml   Output             1625 ml   Net            -1625 ml       PHYSICAL EXAMINATION     Physical Exam   Constitutional: He is oriented to person, place, and time  He appears well-developed  No distress  HENT:   Head: Normocephalic  Mouth/Throat: Oropharynx is clear and moist    Eyes: Conjunctivae are normal  No scleral icterus  Neck: Neck supple  No JVD present  Cardiovascular: Normal rate and normal heart sounds      Pulmonary/Chest: Effort normal and breath sounds normal  No respiratory distress  He has no wheezes  He has no rales  Abdominal: Soft  Bowel sounds are normal  He exhibits no distension  There is no tenderness  Musculoskeletal: Normal range of motion  He exhibits no edema  Neurological: He is alert and oriented to person, place, and time  Skin: Skin is warm  No rash noted  Psychiatric: He has a normal mood and affect  His behavior is normal         LAB RESULTS       Results from last 7 days  Lab Units 08/25/18  0438 08/24/18  1120   WBC Thousand/uL  --  4 75   HEMOGLOBIN g/dL  --  11 1*   HEMATOCRIT %  --  36 0*   PLATELETS Thousands/uL  --  145*   SODIUM mmol/L 139 136   POTASSIUM mmol/L 3 6 4 2   CHLORIDE mmol/L 106 103   CO2 mmol/L 25 24   BUN mg/dL 35* 34*   CREATININE mg/dL 2 52* 2 55*   EGFR ml/min/1 73sq m 22 22   CALCIUM mg/dL 8 7 8 8   MAGNESIUM mg/dL 2 0 2 1   TOTAL PROTEIN g/dL  --  8 1   GLUCOSE RANDOM mg/dL 84 116       RADIOLOGY RESULTS      No results found for this or any previous visit  Results for orders placed during the hospital encounter of 07/11/18   XR chest pa & lateral    Narrative CHEST     INDICATION:   Blood loss  Labored breathing    COMPARISON:  7/13/2017    EXAM PERFORMED/VIEWS:  XR CHEST AP & LATERAL      FINDINGS:  There are median sternotomy wires indicating prior cardiac surgery  Heart shadow is mildly enlarged but unchanged from prior exam     Megan Pham left pleural effusion noted  This partially obscures visualization of the left base  Right upper lobe granuloma noted  Lungs otherwise clear  Osseous structures appear within normal limits for patient age  Impression Small to moderate-sized left pleural effusion        Workstation performed: [id number]       No results found for this or any previous visit  No results found for this or any previous visit    Results for orders placed during the hospital encounter of 07/11/18   CT abdomen pelvis wo contrast    Narrative CT ABDOMEN AND PELVIS WITHOUT IV CONTRAST    INDICATION:   Gross hematuria rule out pathology, elevated creatinine cannot tolerate contrast     COMPARISON: 07/10/2017    TECHNIQUE:  CT examination of the abdomen and pelvis was performed without intravenous contrast   Axial, sagittal, and coronal 2D reformatted images were created from the source data and submitted for interpretation  Radiation dose length product (DLP) for this visit:  631 mGy-cm   This examination, like all CT scans performed in the [facility], was performed utilizing techniques to minimize radiation dose exposure, including the use of iterative   reconstruction and automated exposure control  Enteric contrast was administered  FINDINGS:    ABDOMEN    LOWER CHEST: Large left-sided pleural effusion  Mild atelectasis at the right lung base  Atelectasis at the left lung base  LIVER/BILIARY TREE:  Unremarkable  GALLBLADDER:  Multiple gallstones in the gallbladder  SPLEEN:  Scattered splenic granulomata  PANCREAS:  Unremarkable  ADRENAL GLANDS:  Unremarkable  KIDNEYS/URETERS:  Multiple bilateral renal cysts  Scattered calcifications which appear to be related to the cysts  STOMACH AND BOWEL:  No obvious hydronephrosis although difficult to evaluate in the presence of multiple cysts  APPENDIX:  No findings to suggest appendicitis  ABDOMINOPELVIC CAVITY:  No ascites or free intraperitoneal air  No lymphadenopathy  VESSELS:  Unremarkable for patient's age  PELVIS    REPRODUCTIVE ORGANS:  Penile implant and reservoir in place  URINARY BLADDER:  A cystectomy has been performed  ABDOMINAL WALL/INGUINAL REGIONS:  Ileal loop in the right lower quadrant  OSSEOUS STRUCTURES:  Degenerative changes in the lumbar spine  1st degree anterolisthesis of L4 with respect to L5  Impression 1  Large left-sided pleural effusion  2   Bibasilar atelectasis  3   Cholelithiasis    4   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  5   Cystectomy and ileal loop  6  Penile implant and reservoir  Workstation performed: [id number]       No results found for this or any previous visit  PLAN / RECOMMENDATIONS      CKD stage 4:  Stable at this point    Volume overload status:  Asymptomatic    Disposition can be discharged renal point of view and I will monitor him as outpatient    Dwight Andersen MD  Nephrology  8/26/2018        Portions of the record may have been created with voice recognition software  Occasional wrong word or "sound a like" substitutions may have occurred due to the inherent limitations of voice recognition software  Read the chart carefully and recognize, using context, where substitutions have occurred

## 2018-08-26 NOTE — ASSESSMENT & PLAN NOTE
· POA evident by progressive SOB x 1 week and elevated BNP >06846  Significantly improved   · Successfully diuresed by IV lasix, now back on oral form - continue 40 mg bid on discharge with potassium supplement  · Continue to monitor daily weight, 1500 mL fluid restriction, and low sodium diet  · Echocardiogram showing LVEF 40-45% inferior and lateral walls appear hypokinetic  Right ventricle has a peak pressure of at least 44 mmHg  Severe to moderate mitral valve regurgitation    And bilateral atrium are dilated  · Follow up Dr Ирина Vaca upon discharge

## 2018-08-26 NOTE — ASSESSMENT & PLAN NOTE
· Discussion with Nephrology patient does not have an acute kidney injury patient likely has CKD stage 4 the fluctuating creatinine    · Creatinine currently stable at 2 52  · Follow up Dr Tilford Habermann outpatient, BMP in the next week

## 2018-08-26 NOTE — PROGRESS NOTES
General Cardiology   Progress Note   Sierra Merchant 80 y o  male MRN: [id number]  Unit/Bed#: -01 Encounter: [id number]        Subjective:   Patient continues to feel dizzy, blood pressure is stable  Patient is euvolemic    Objective:   Vitals:  Vitals:    08/26/18 1122   BP: 124/78   Pulse:    Resp:    Temp:    SpO2:        Body mass index is 23 54 kg/m²  Systolic (33TPK), NRA:894 , Min:100 , HHO:876     Diastolic (36MFR), WTX:02, Min:59, Max:84      Intake/Output Summary (Last 24 hours) at 08/26/18 1124  Last data filed at 08/26/18 0900   Gross per 24 hour   Intake              240 ml   Output             1625 ml   Net            -1385 ml     Weight (last 2 days)     Date/Time   Weight    08/26/18 0600  72 3 (159 39)    08/25/18 0555  71 2 (156 97)    08/24/18 1445  77 (169 75)    08/24/18 1338  74 3 (163 8)    08/24/18 1058  71 2 (156 97)              PHYSICAL EXAMS:  General:  Patient is not in acute distress, laying in the bed comfortably, awake, alert responding to commands  Head: Normocephalic, Atraumatic  HEENT: White sclera, pink conjunctiva,  PERRLA,pharynx benign  Neck:  Supple, no neck vein distention, carotids+2/+2 no bruits, thyromegaly, adenopathy  Respiratory:  Decreased breath sounds on the left side  Cardiovascular:  PMI normal, S1-S2 normal, No  Murmurs, thrills, gallops, rubs   Irregularly irregular  GI:  Abdomen soft nontender   No hepatosplenomegaly, adenopathy, ascites,or rebound tenderness  Extremities: No edema, normal pulses, no calf tenderness, no joint deformities, no venous disease   Integument:  No skin rashes or ulceration  Lymphatic:  No cervical or inguinal lymphadenopathy  Neurologic:  Patient is awake alert, responding to command, well-oriented to time and place and person moving all extremities      LABORATORY RESULTS:    Results from last 7 days  Lab Units 08/24/18  1653 08/24/18  1120   TROPONIN I ng/mL 0 04 0 04     CBC with diff:   Results from last 7 days  Lab Units 18  1120   WBC Thousand/uL 4 75   HEMOGLOBIN g/dL 11 1*   HEMATOCRIT % 36 0*   MCV fL 85   PLATELETS Thousands/uL 145*   MCH pg 26 2*   MCHC g/dL 30 8*   RDW % 15 5*   MPV fL 11 1   NRBC AUTO /100 WBCs 0       CMP:  Results from last 7 days  Lab Units 18  0438 18  1120   SODIUM mmol/L 139 136   POTASSIUM mmol/L 3 6 4 2   CHLORIDE mmol/L 106 103   CO2 mmol/L 25 24   ANION GAP mmol/L 8 9   BUN mg/dL 35* 34*   CREATININE mg/dL 2 52* 2 55*   GLUCOSE RANDOM mg/dL 84 116   CALCIUM mg/dL 8 7 8 8   AST U/L  --  17   ALT U/L  --  17   ALK PHOS U/L  --  92   TOTAL PROTEIN g/dL  --  8 1   BILIRUBIN TOTAL mg/dL  --  0 90   EGFR ml/min/1 73sq m 22 22       BMP:  Results from last 7 days  Lab Units 18  0438 18  1120   SODIUM mmol/L 139 136   POTASSIUM mmol/L 3 6 4 2   CHLORIDE mmol/L 106 103   CO2 mmol/L 25 24   BUN mg/dL 35* 34*   CREATININE mg/dL 2 52* 2 55*   GLUCOSE RANDOM mg/dL 84 116   CALCIUM mg/dL 8 7 8 8         Results from last 7 days  Lab Units 18  1120   NT-PRO BNP pg/mL 21,281*        Results from last 7 days  Lab Units 18  0438 18  1120   MAGNESIUM mg/dL 2 0 2 1               Results from last 7 days  Lab Units 18  1120   INR  1 68*       Lipid Profile:   No results found for: CHOL  Lab Results   Component Value Date    HDL 48 2017     Lab Results   Component Value Date    LDLCALC 79 2017     Lab Results   Component Value Date    TRIG 72 2017       Cardiac testing:   Results for orders placed during the hospital encounter of 18   Echo complete with contrast if indicated    Narrative [address] [place] [address] [place] [id number] [phone]    Transthoracic Echocardiogram  2D, M-mode, Doppler, and Color Doppler    Study date:  24-Aug-2018    Patient: Nan Coughlin  MR number: [id number]  Account number: [de-identified]  : 1930  Age: 80 years  Gender: Male  Status: Inpatient  Location: Bedside  Height: 69 in  Weight: 163 lb  BP: 120/ 85 mmHg    Indications: Heart Failure    Diagnoses: I50 9 - Heart failure, unspecified    Sonographer:  LEILA Licea,RDCS  Interpreting Physician:  Rosette Mckay MD  Referring Physician:  Itz Almanza PA-C  Group:  [facility]    SUMMARY    LEFT VENTRICLE:  Ejection fraction was estimated to be 40 % to 45%  Inferior and lateral walls appear hypokinetic  RIGHT VENTRICLE:  Estimated peak pressure was at least 45 mmHg  LEFT ATRIUM:  The atrium was mildly to moderately dilated  RIGHT ATRIUM:  The atrium was dilated  MITRAL VALVE:  There was moderate to severe regurgitation  Similar to echocardiogram from Feb 2017  AORTIC VALVE:  There was trace regurgitation  TRICUSPID VALVE:  There was mild regurgitation  IVC, HEPATIC VEINS:  The inferior vena cava was dilated  PERICARDIUM:  There was a moderate to large -sized left pleural effusion  HISTORY: PRIOR HISTORY: Elevated Troponins, Hypertension, Coronary Artery Disease, Acute Kidney Injury, History of Congestive Heart Failure, History of Cerebral Vascular Accident, Shortness of Breath, Dizziness, Atrial Fibrillation    PROCEDURE: The procedure was performed at the bedside  This was a routine study  The transthoracic approach was used  The study included complete 2D imaging, M-mode, complete spectral Doppler, and color Doppler  The heart rate was 89 bpm,  at the start of the study  Images were obtained from the parasternal, apical, subcostal, and suprasternal notch acoustic windows  Echocardiographic views were limited due to poor acoustic window availability, decreased penetration, and  lung interference  This was a technically difficult study  LEFT VENTRICLE: Size was normal  Ejection fraction was estimated to be 40 % to 45%  Inferior and lateral walls appear hypokinetic   DOPPLER: There was an increased relative contribution of atrial contraction to ventricular filling  RIGHT VENTRICLE: The size was normal  Systolic function was normal  Wall thickness was normal  DOPPLER: Estimated peak pressure was at least 45 mmHg  LEFT ATRIUM: The atrium was mildly to moderately dilated  RIGHT ATRIUM: The atrium was dilated  MITRAL VALVE: There was annular calcification  DOPPLER: There was moderate to severe regurgitation  Similar to echocardiogram from 2017  AORTIC VALVE: The valve was not well visualized  DOPPLER: There was trace regurgitation  TRICUSPID VALVE: The valve structure was normal  There was normal leaflet separation  DOPPLER: The transtricuspid velocity was within the normal range  There was no evidence for stenosis  There was mild regurgitation  PULMONIC VALVE: Leaflets exhibited normal thickness, no calcification, and normal cuspal separation  DOPPLER: The transpulmonic velocity was within the normal range  There was no regurgitation  PERICARDIUM: There was no pericardial effusion  There was a moderate to large -sized left pleural effusion  The pericardium was normal in appearance  AORTA: The root exhibited normal size  SYSTEMIC VEINS: IVC: The inferior vena cava was dilated      SYSTEM MEASUREMENT TABLES    2D  %FS: 8 2 %  Ao Diam: 3 5 cm  EDV(Teich): 107 4 ml  EF Biplane: 35 1 %  EF(Teich): 18 2 %  ESV(Teich): 87 9 ml  IVSd: 1 2 cm  LA Area: 30 9 cm2  LA Diam: 4 1 cm  LVEDV MOD A2C: 167 1 ml  LVEDV MOD A4C: 170 7 ml  LVEDV MOD BP: 169 2 ml  LVEF MOD A2C: 26 6 %  LVEF MOD A4C: 42 6 %  LVESV MOD A2C: 122 6 ml  LVESV MOD A4C: 98 1 ml  LVESV MOD BP: 109 7 ml  LVIDd: 4 8 cm  LVIDs: 4 4 cm  LVLd A2C: 9 7 cm  LVLd A4C: 9 8 cm  LVLs A2C: 9 3 cm  LVLs A4C: 9 4 cm  LVPWd: 1 cm  RA Area: 21 6 cm2  RVIDd: 3 3 cm  SV MOD A2C: 44 5 ml  SV MOD A4C: 72 6 ml  SV(Teich): 19 6 ml    CW  MR VTI: 161 2 cm  MR Vmax: 5 7 m/s  MR Vmean: 4 3 m/s  MR maxP 3 mmHg  MR meanP 1 mmHg  TR Vmax: 3 4 m/s  TR maxP 4 mmHg    MM  TAPSE: 1 6 [facility]redited [facility]ch[facility]    Prepared and electronically signed by    Jag Ziegler MD  Signed 24-Aug-2018 18:53:36       No results found for this or any previous visit  No results found for this or any previous visit  No procedure found  No results found for this or any previous visit  Meds/Allergies   all current active meds have been reviewed  Prescriptions Prior to Admission   Medication    carvedilol (COREG) 6 25 mg tablet    isosorbide mononitrate (IMDUR) 60 mg 24 hr tablet    [DISCONTINUED] amLODIPine (NORVASC) 5 mg tablet    [DISCONTINUED] furosemide (LASIX) 40 mg tablet    albuterol (PROVENTIL HFA,VENTOLIN HFA) 90 mcg/act inhaler            Assessment/Plan:  1   Acute on chronic systolic CHF, ischemic cardiomyopathy:  LVEF 40-45%  Euvolemic, continue oral Lasix  - continue beta-blocker   No ACEI / ARB given elevated creatinine  - low-salt diet, daily weights, I/O     2   Paroxysmal atrial fibrillation:  -   Continue Eliquis for anticoagulation   Continue Coreg for rate control      3   Mitral regurgitation:  Moderate to severe  Not the best candidate for surgery  He might be a candidate for mitral clip  -on Lasix     4   CAD S/ P CABG x4:  -  No anginal symptoms   Continue Coreg  Not on aspirin as patient is on Eliquis   Intolerant to statins  Stop Imdur and Norvasc, he has orthostatic dizziness     5   History of CVA/TIA:   No aspirin as patient is on Eliquis   Intolerant to statins      6   Hypertension:  Stable, continue present regimen     7   Hyperlipidemia:  Intolerant to statins  8   Orthostatic dizziness, stop Norvasc and Imdur    DC home today, follow up within 1-2 weeks    Counseling / Coordination of Care  Total floor / unit time spent today 25 minutes  Greater than 50% of total time was spent with the patient and / or family counseling and / or coordination of care        ** Please Note: Dragon 360 Dictation voice to text software may have been used in the creation of this document   **

## 2018-08-26 NOTE — ASSESSMENT & PLAN NOTE
· Patient has a history of ileal conduit s/p cystectomy  · Family verbalized concern for UTI - afebrile, no leukocytosis, normal procalcitonin - no indication for abx at this time, monitor

## 2018-08-26 NOTE — DISCHARGE SUMMARY
Discharge- Jessica Rojas 6/21/1930, 80 y o  male MRN: [id number]    Unit/Bed#: -01 Encounter: [id number]    Primary Care Provider: Sonido Saunders MD   Date and time admitted to hospital: 8/24/2018 10:54 AM        * Acute systolic (congestive) heart failure ([facility] [address] )   Assessment & Plan    · POA evident by progressive SOB x 1 week and elevated BNP >51088  Significantly improved   · Successfully diuresed by IV lasix, now back on oral form - continue 40 mg bid on discharge with potassium supplement  · Continue to monitor daily weight, 1500 mL fluid restriction, and low sodium diet  · Echocardiogram showing LVEF 40-45% inferior and lateral walls appear hypokinetic  Right ventricle has a peak pressure of at least 44 mmHg  Severe to moderate mitral valve regurgitation  And bilateral atrium are dilated  · Follow up Dr Rubén White upon discharge         Chronic kidney disease (CKD), stage IV (severe) ([facility] )   Assessment & Plan    · Continue outpatient follow up        JUSTINE (acute kidney injury) [facility])   Assessment & Plan    · Discussion with Nephrology patient does not have an acute kidney injury patient likely has CKD stage 4 the fluctuating creatinine    · Creatinine currently stable at 2 52  · Follow up Dr Rosendo Bronson outpatient, BMP in the next week        Atrial fibrillation [facility])   Assessment & Plan    · Rate controlled, continue Eliquis (dose reduced for age and creatinine function)        CAD (coronary artery disease)   Assessment & Plan    · Continue Coreg        History of cerebellar stroke   Assessment & Plan    · Known history, no acute neurologic change this admission        History of ileal conduit   Assessment & Plan    · Patient has a history of ileal conduit s/p cystectomy  · Family verbalized concern for UTI - afebrile, no leukocytosis, normal procalcitonin - no indication for abx at this time, monitor            Discharging Physician / Practitioner: Sherri Jama PA-C  PCP: Sonido Saunders MD  Admission Date:   Admission Orders     Ordered        08/24/18 1221  Inpatient Admission (expected length of stay for this patient is greater than two midnights)  Once             Discharge Date: 08/26/18    Resolved Problems  Date Reviewed: 8/26/2018    None          Consultations During Hospital Stay:  · Nephrology  · Cardiology     Procedures Performed:     · Echo 8/24: EF 40-45% with hypokinetic LV walls; biatrial dilation, mod-severe mitral regurg, dilated IVC    Significant Findings / Test Results:     · BNP 21,281 on admission   Ref  Range 8/25/2018 04:38   Sodium Latest Ref Range: 136 - 145 mmol/L 139   Potassium Latest Ref Range: 3 5 - 5 3 mmol/L 3 6   Chloride Latest Ref Range: 100 - 108 mmol/L 106   CO2 Latest Ref Range: 21 - 32 mmol/L 25   Anion Gap Latest Ref Range: 4 - 13 mmol/L 8   BUN Latest Ref Range: 5 - 25 mg/dL 35 (H)   Creatinine Latest Ref Range: 0 60 - 1 30 mg/dL 2 52 (H)   Glucose Latest Ref Range: 65 - 140 mg/dL 84   Calcium Latest Ref Range: 8 3 - 10 1 mg/dL 8 7   eGFR Latest Units: ml/min/1 73sq m 22   Magnesium Latest Ref Range: 1 6 - 2 6 mg/dL 2 0   ·     Incidental Findings:   ·      Test Results Pending at Discharge (will require follow up):   ·      Outpatient Followup Requested:  · Cardiology in 1-2 weeks: requested transfer care to Dr Gonzalo Wright  · PCP in 1-2 weeks  · BMP within the week    Complications:  none    Reason for Admission: acute systolic CHF exacerbation    Hospital Course:     Noman Turner is a 80 y o  male patient who originally presented to the hospital on 8/24/2018 due to shortness of breath and edema  Patient has a history of heart failure and had notice progressive shortness of breath and weight gain x1 week  In the ER, patient was found to have volume overload with elevated BNP, he was admitted for diuresis  Cardiology nephrology consult, he has CKD which remained stable  After successful IV diuresis, patient was transition to oral Lasix, to continue 40 mg b i d  along with low-dose potassium replacement  Patient will follow up with Cardiology and Nephrology outpatient, he should get a BMP within week after discharge  He should continue with a low sodium and fluid restricted diet  He was advised on CHF monitoring including daily weights, signs and symptoms of fluid overload, and reasons to return to the ER  Patient also has AFib, currently rate controlled and on reduced dose of Eliquis secondary to age and renal function  Echo performed this admission as above shows reduced systolic function, moderate to severe mitral regurgitation, and biatrial dilation  Please see above list of diagnoses and related plan for additional information  Condition at Discharge: fair     Discharge Day Visit / Exam:     Subjective:  Patient seen lying in bed, easily woken  He reports feeling much better since admission  Denies any orthopnea or shortness of breath  No chest pain or palpitations  Has been urinating via ileal conduit without difficulty  Reports no lower extremity edema  He is feeling ready to go home  Vitals: Blood Pressure: 121/70 (08/26/18 1015)  Pulse: 90 (08/26/18 0700)  Temperature: 97 9 °F (36 6 °C) (08/26/18 0700)  Temp Source: Oral (08/26/18 0700)  Respirations: 18 (08/26/18 0700)  Height: 5' 9" (175 3 cm) (08/24/18 1338)  Weight - Scale: 72 3 kg (159 lb 6 3 oz) (08/26/18 0600)  SpO2: 97 % (08/26/18 0700)  Exam:   Physical Exam   Constitutional: Vital signs are normal  He appears well-developed and well-nourished  No distress  Cardiovascular: Normal rate, S1 normal, S2 normal and intact distal pulses  An irregularly irregular rhythm present  Murmur (Systolic) heard  Pulmonary/Chest: Effort normal and breath sounds normal  No respiratory distress  He has no wheezes  He has no rhonchi  He has no rales  Abdominal: Soft  Bowel sounds are normal  He exhibits no distension  There is no tenderness     Genitourinary:   Genitourinary Comments: Ileal conduit, draining yellow urine   Musculoskeletal: He exhibits no edema  Psychiatric: He has a normal mood and affect  Nursing note and vitals reviewed  Discussion with Family: cardiology spoke directly with family on day of discharge, I explained discharge plan to the patient in detail; all questions answered to the best of my     Discharge instructions/Information to patient and family:   See after visit summary for information provided to patient and family  Provisions for Follow-Up Care:  See after visit summary for information related to follow-up care and any pertinent home health orders  Disposition:     Home     Planned Readmission: none     Discharge Statement:  I spent approximately 40 minutes discharging the patient  This time was spent on the day of discharge  I had direct contact with the patient on the day of discharge  Greater than 50% of the total time was spent examining patient, answering all patient questions, arranging and discussing plan of care with patient as well as directly providing post-discharge instructions  Additional time then spent on discharge activities  Discharge Medications:  See after visit summary for reconciled discharge medications provided to patient and family        ** Please Note: This note has been constructed using a voice recognition system **

## 2018-08-26 NOTE — DISCHARGE INSTR - AVS FIRST PAGE
Please monitor your blood pressure at home, we have made some changes to your regimen  He will need to follow up with Dr Blanca Zendejas in the short future, recommend obtaining a BMP to re-evaluate your electrolytes and kidney functions  Please check your blood pressures at home, up to 1-2 times per day  If her starting to feel lightheaded or dizzy OR noticed low blood pressures, please contact your cardiology office, additionally you can decrease the carvedilol to 3 125 mg by cutting the tablets in half  Please be sure to be checking weight every day, 1st thing in the morning after 1st urination  Keep a record of your weights as well as symptoms  Maintain a low-salt diet along with fluid restriction of 1 5 L per day for the time being

## 2018-08-26 NOTE — DISCHARGE INSTRUCTIONS
A-fib (Atrial Fibrillation)   WHAT YOU NEED TO KNOW:   A-fib may come and go, or it may be a long-term condition  A-fib can cause blood clots, stroke, or heart failure  These conditions may become life-threatening  It is important to treat and manage a-fib to help prevent a blood clot, stroke, or heart failure  DISCHARGE INSTRUCTIONS:   Call 911 for any of the following:   · You have any of the following signs of a heart attack:      ¨ Squeezing, pressure, or pain in your chest that lasts longer than 5 minutes or returns    ¨ Discomfort or pain in your back, neck, jaw, stomach, or arm     ¨ Trouble breathing    ¨ Nausea or vomiting    ¨ Lightheadedness or a sudden cold sweat, especially with chest pain or trouble breathing    · You have any of the following signs of a stroke:      ¨ Numbness or drooping on one side of your face     ¨ Weakness in an arm or leg    ¨ Confusion or difficulty speaking    ¨ Dizziness, a severe headache, or vision loss  Seek care immediately if:  You have any of the following signs of a blood clot:  · You feel lightheaded, are short of breath, and have chest pain  · You cough up blood  · You have swelling, redness, pain, or warmth in your arm or leg  Contact your cardiologist or healthcare provider if:   · Your heart rate is higher than your healthcare provider said it should be  · You have new or worsening swelling in your legs, feet, ankles, or abdomen  · You are short of breath, even at rest      · You have questions or concerns about your condition or care  Medicines: You may need any of the following:  · Heart medicines  help control your heart rate and rhythm  You may need more than one medicine to treat your symptoms  · Blood thinners    help prevent blood clots  Examples of blood thinners include heparin and warfarin  Clots can cause strokes, heart attacks, and death   The following are general safety guidelines to follow while you are taking a blood thinner:    ¨ Watch for bleeding and bruising while you take blood thinners  Watch for bleeding from your gums or nose  Watch for blood in your urine and bowel movements  Use a soft washcloth on your skin, and a soft toothbrush to brush your teeth  This can keep your skin and gums from bleeding  If you shave, use an electric shaver  Do not play contact sports  ¨ Tell your dentist and other healthcare providers that you take anticoagulants  Wear a bracelet or necklace that says you take this medicine  ¨ Do not start or stop any medicines unless your healthcare provider tells you to  Many medicines cannot be used with blood thinners  ¨ Tell your healthcare provider right away if you forget to take the medicine, or if you take too much  ¨ Warfarin  is a blood thinner that you may need to take  The following are things you should be aware of if you take warfarin  § Foods and medicines can affect the amount of warfarin in your blood  Do not make major changes to your diet while you take warfarin  Warfarin works best when you eat about the same amount of vitamin K every day  Vitamin K is found in green leafy vegetables and certain other foods  Ask for more information about what to eat when you are taking warfarin  § You will need to see your healthcare provider for follow-up visits when you are on warfarin  You will need regular blood tests  These tests are used to decide how much medicine you need  · Antiplatelets , such as aspirin, help prevent blood clots  Take your antiplatelet medicine exactly as directed  These medicines make it more likely for you to bleed or bruise  If you are told to take aspirin, do not take acetaminophen or ibuprofen instead  · Take your medicine as directed  Contact your healthcare provider if you think your medicine is not helping or if you have side effects  Tell him or her if you are allergic to any medicine   Keep a list of the medicines, vitamins, and herbs you take  Include the amounts, and when and why you take them  Bring the list or the pill bottles to follow-up visits  Carry your medicine list with you in case of an emergency  Follow up with your cardiologist as directed: You will need regular blood tests and monitoring  Write down your questions so you remember to ask them during your visits  Manage A-fib:   · Know your target heart rate  Learn how to take your pulse and monitor your heart rate  · Manage other health conditions  This includes high blood pressure, sleep apnea, thyroid disease, diabetes, and other heart conditions  Take medicine as directed and follow your treatment plan  · Limit or do not drink alcohol  Alcohol can make a-fib hard to manage  Ask your healthcare provider if it is safe for you to drink alcohol  A drink of alcohol is 12 ounces of beer, 5 ounces of wine, or 1½ ounces of liquor  · Do not smoke  Nicotine and other chemicals in cigarettes and cigars can cause heart and lung damage  Ask your healthcare provider for information if you currently smoke and need help to quit  E-cigarettes or smokeless tobacco still contain nicotine  Talk to your healthcare provider before you use these products  · Eat heart-healthy foods  Heart healthy foods will help keep your cholesterol low  These include fruits, vegetables, whole-grain breads, low-fat dairy products, beans, lean meats, and fish  Replace butter and margarine with heart-healthy oils such as olive oil and canola oil  · Maintain a healthy weight  Ask your healthcare provider how much you should weigh  Ask him to help you create a weight loss plan if you are overweight  · Exercise for 30 minutes  most days of the week  Ask your healthcare provider about the best exercise plan for you  © 2017 [address] [employer] Information is for End User's use only and may not be sold, redistributed or otherwise used for commercial purposes   All illustrations and images included in CareNotes® are the copyrighted property of [employer]  or Pankaj Sheppard  The above information is an  only  It is not intended as medical advice for individual conditions or treatments  Talk to your doctor, nurse or pharmacist before following any medical regimen to see if it is safe and effective for you  Acute Kidney Injury   WHAT YOU NEED TO KNOW:   Acute kidney injury (JUSTINE) is also called acute kidney failure, or acute renal failure  JUSTINE happens when your kidneys suddenly stop working correctly  Normally, the kidneys remove fluid, chemicals, and waste from your blood  These wastes are turned into urine by your kidneys  JUSTINE usually happens over hours or days  When you have JUSTINE, your kidneys do not remove the waste, chemicals, or extra fluid from your body  A normal amount of urine is not produced  JUSTINE is usually temporary, it can take days to months to recover  JUSTINE can also become a chronic kidney condition  DISCHARGE INSTRUCTIONS:   Call 911 if:   · You have sudden chest pain or trouble breathing  Seek care immediately if:   · Your symptoms get worse  Contact your healthcare provider if:   · Your symptoms return  · Your blood sugar or blood pressure level is not within the range your healthcare provider recommends  · You have questions or concerns about your condition or care  Nutrition:  Your healthcare provider may tell you to eat food low in sodium (salt), potassium, phosphorus, or protein  A dietitian can help you plan your meals  Drink liquids as directed: Your healthcare provider may recommend that you drink a certain amount of liquids  This will help your kidneys work better and decrease your risk for dehydration  Ask how much liquid to drink each day and which liquids are best for you  Prevent acute kidney injury:   · Manage other health conditions  such as diabetes, high blood pressure, or heart disease   These conditions increase your risk for acute kidney injury  Take your medicines for these conditions as directed  Also, monitor your blood sugar and blood pressure levels as directed  Contact your healthcare provider if your levels are not in the range he or she says it should be  · Talk to your healthcare provider before you take over-the-counter-medicine  NSAIDs, stomach medicine, or laxatives may harm your kidneys and increase your risk for acute kidney injury  If it is okay to take the medicine, follow the directions on the package  Do not take more than directed  · Tell healthcare providers you have had acute kidney injury  before you get contrast liquid for an x-ray or CT scan  Your healthcare provider may give you medicine to prevent kidney problems caused by the liquid  Follow up with your healthcare provider as directed: You will need to return for more tests to make sure your kidneys are working properly  You may also be referred to a kidney specialist  Write down your questions so you remember to ask them during your visits  © 2017 [address] [employer]  Information is for End User's use only and may not be sold, redistributed or otherwise used for commercial purposes  All illustrations and images included in CareNotes® are the copyrighted property of A [employer] M [employer]  or Pankaj Sheppard  The above information is an  only  It is not intended as medical advice for individual conditions or treatments  Talk to your doctor, nurse or pharmacist before following any medical regimen to see if it is safe and effective for you  Chronic Kidney Disease   WHAT YOU NEED TO KNOW:   Chronic kidney disease (CKD) is the gradual and permanent loss of kidney function  It is also called chronic kidney failure, or chronic renal insufficiency  Normally, the kidneys remove fluid, chemicals, and waste from your blood  These wastes are turned into urine by your kidneys   CKD may worsen over time and lead to kidney failure  DISCHARGE INSTRUCTIONS:   Seek care immediately if:   · You are confused and very drowsy  · You have a seizure  · You have shortness of breath  Contact your healthcare provider if:   · You suddenly gain or lose more weight than your healthcare provider has told you is okay  · You have itchy skin or a rash  · You urinate more or less than you normally do  · You have blood in your urine  · You have nausea and repeated vomiting  · You have fatigue or muscle weakness  · You have hiccups that will not stop  · You have questions or concerns about your condition or care  Medicines:   · Medicines  may be given to decrease blood pressure and get rid of extra fluid  You may also receive medicine to manage health conditions that may occur with CKD, such as anemia, diabetes, and heart disease  · Take your medicine as directed  Contact your healthcare provider if you think your medicine is not helping or if you have side effects  Tell him or her if you are allergic to any medicine  Keep a list of the medicines, vitamins, and herbs you take  Include the amounts, and when and why you take them  Bring the list or the pill bottles to follow-up visits  Carry your medicine list with you in case of an emergency  Follow up with your healthcare provider as directed: You will need to return for tests to monitor your kidney function  You may also be referred to a kidney specialist  Write down your questions so you remember to ask them during your visits  Manage other health conditions: Follow your healthcare provider's directions on how to manage diabetes, high blood pressure, and heart disease  These conditions can make CKD worse  Talk to your healthcare provider before you take over-the-counter medicine  Medicines such as NSAIDs, stomach medicine, or laxatives may harm your kidneys  Weigh yourself daily:  Ask your healthcare provider what your weight should be   Ask how much liquid you should drink each day  CKD may cause you to gain or lose weight rapidly  Weigh yourself every day  Write down your weight, how much liquid you drink or eat, and how much you urinate each day  Contact your healthcare provider if your weight is higher or lower than it should be  Manage CKD:   · Maintain a healthy weight  Ask your healthcare provider how much you should weigh  Ask him to help you create a weight loss plan if you are overweight  · Exercise 30 to 60 minutes a day, 4 to 7 times a week, or as directed  Ask about the best exercise plan for you  Regular exercise can help you manage CKD, high blood pressure, and diabetes  · Follow your healthcare provider's advice about what to eat and drink  He may tell you to eat food low in sodium (salt), potassium, phosphorus, or protein  You may need to see a dietitian if you need help planning meals  Ask how much liquid to drink each day and which liquids are best for you  · Limit alcohol  Ask how much alcohol is safe for you to drink  A drink of alcohol is 12 ounces of beer, 5 ounces of wine, or 1½ ounces of liquor  · Do not smoke  Nicotine and other chemicals in cigarettes and cigars can cause lung and kidney damage  Ask your healthcare provider for information if you currently smoke and need help to quit  E-cigarettes or smokeless tobacco still contain nicotine  Talk to your healthcare provider before you use these products  · Ask your healthcare provider if you need vaccines  Infections such as pneumonia, influenza, and hepatitis can be more harmful or more likely to occur in a person who has CKD  Vaccines reduce your risk of infection with these viruses  © 2017 [address] [facility] Information is for End User's use only and may not be sold, redistributed or otherwise used for commercial purposes   All illustrations and images included in CareNotes® are the copyrighted property of [employer]  or [facility] [employer]  The above information is an  only  It is not intended as medical advice for individual conditions or treatments  Talk to your doctor, nurse or pharmacist before following any medical regimen to see if it is safe and effective for you  Dizziness   WHAT YOU NEED TO KNOW:   What is dizziness? Dizziness is a feeling of being off balance or unsteady  Common causes of dizziness are an inner ear fluid imbalance or a lack of oxygen in your blood  Dizziness may be acute (lasts 3 days or less) or chronic (lasts longer than 3 days)  You may have dizzy spells that last from seconds to a few hours  What increases my risk for dizziness? Dizziness may get worse during certain activities or when you move a certain way  The following may also increase your risk for dizziness:  · Older age    · An infection, ear surgery, or an inner ear condition, such as Ménière disease    · Stroke, a brain tumor, or a recent head trauma     · An injury that causes a large amount of blood loss    · Heart or blood pressure problems     · Exposure to chemicals, or long-term alcohol use     · Medicines used to treat high blood pressure, seizure disorders, or anxiety and depression     · A nerve disorder, such as multiple sclerosis  What signs and symptoms may happen with dizziness? · A feeling that your surroundings are moving even though you are standing still    · Ringing in your ears or hearing loss     · Feeling faint or lightheaded     · Weakness or unsteadiness     · Double vision or eye movements you cannot control    · Nausea or vomiting     · Confusion  How is the cause of dizziness diagnosed? Your healthcare provider may ask when the dizziness started  Tell the provider if you have dizzy spells, and how long they last  Tell him or her what happens before you become dizzy  The provider will ask if you have other health conditions and if you take any medicines   He or she will check your blood pressure and pulse to see if your dizziness may be related to your heart  Your balance, strength, reflexes, and the way you walk may also be checked  You may need any of the following tests to help find the cause of your dizziness:  · An EKG  records the electrical activity of your heart  An EKG can be used to check for an abnormal heart beat or heart damage  · Blood tests  will check your blood sugar level, infection, and your blood cell levels  · CT or MRI  pictures check for a stroke, head injury, or brain tumor  They also check for brain bleeding or swelling  You may be given contrast liquid to help your brain show up better in the pictures  Tell the healthcare provider if you have ever had an allergic reaction to contrast liquid  Do not enter the MRI room with anything metal  Metal can cause serious injury  Tell the healthcare provider if you have any metal in or on your body  How is dizziness treated? Treatment will depend on the cause of your dizziness  Your healthcare provider may give you oxygen or medicines to decrease your dizziness and nausea  He may also refer you to a specialist  Fredo Barrier may need to be admitted to the hospital for treatment  How can I manage my symptoms? · Do not drive  or operate heavy machinery when you are dizzy  · Get up slowly  from sitting or lying down  · Drink plenty of liquids  Liquids help prevent dehydration  Ask how much liquid to drink each day and which liquids are best for you  When should I seek immediate care? · You have a headache and a stiff neck  · You have shaking chills and a fever  · You vomit over and over with no relief  · Your vomit or bowel movements are red or black  · You have pain in your chest, back, or abdomen  · You have numbness, especially in your face, arms, or legs  · You have trouble moving your arms or legs  · You are confused  When should I contact my healthcare provider? · You have a fever       · Your symptoms do not get better with treatment  · You have questions or concerns about your condition or care  CARE AGREEMENT:   You have the right to help plan your care  Learn about your health condition and how it may be treated  Discuss treatment options with your caregivers to decide what care you want to receive  You always have the right to refuse treatment  The above information is an  only  It is not intended as medical advice for individual conditions or treatments  Talk to your doctor, nurse or pharmacist before following any medical regimen to see if it is safe and effective for you  © 2017 [address] [facility] Information is for End User's use only and may not be sold, redistributed or otherwise used for commercial purposes  All illustrations and images included in CareNotes® are the copyrighted property of [employer]  or Pankaj Sheppard  Heart Failure   WHAT YOU NEED TO KNOW:   Heart failure (HF) is a condition that does not allow your heart to fill or pump properly  Not enough oxygen in your blood gets to your organs and tissues  HF can occur in the right side, the left side, or both lower chambers of your heart  HF is often caused by damage or injury to your heart  The damage may be caused by heart attack, other heart conditions, or high blood pressure  HF is a long-term condition that tends to get worse over time  It is important to manage your health to improve your quality of life  HF can be worsened by heavy alcohol use, smoking, diabetes that is not controlled, or obesity          DISCHARGE INSTRUCTIONS:   Call 911 if:   · You have any of the following signs of a heart attack:      ¨ Squeezing, pressure, or pain in your chest that lasts longer than 5 minutes or returns    ¨ Discomfort or pain in your back, neck, jaw, stomach, or arm     ¨ Trouble breathing    ¨ Nausea or vomiting    ¨ Lightheadedness or a sudden cold sweat, especially with chest pain or trouble breathing    Seek care immediately if:   · You gain 3 or more pounds (1 4 kg) in a day, or more than your healthcare provider says you should  · Your heartbeat is fast, slow, or uneven all the time  Contact your healthcare provider if:   · You have symptoms of worsening HF:      ¨ Shortness of breath at rest, at night, or that is getting worse in any way     ¨ Weight gain of 5 or more pounds (2 2 kg) in a week     ¨ More swelling in your legs or ankles     ¨ Abdominal pain or swelling     ¨ More coughing     ¨ Loss of appetite     ¨ Feeling tired all the time    · You feel hopeless or depressed, or you have lost interest in things you used to enjoy  · You often feel worried or afraid  · You have questions or concerns about your condition or care  Medicines: You may  need any of the following:  · Medicines  may be given to help regulate your heart rhythm  You may also need medicines to lower your blood pressure, and to get rid of extra fluids  · Take your medicine as directed  Contact your healthcare provider if you think your medicine is not helping or if you have side effects  Tell him or her if you are allergic to any medicine  Keep a list of the medicines, vitamins, and herbs you take  Include the amounts, and when and why you take them  Bring the list or the pill bottles to follow-up visits  Carry your medicine list with you in case of an emergency  Follow up with your healthcare provider or cardiologist as directed: You may need to return for other tests  You may need home health care  A healthcare provider will monitor your vital signs, weight, and make sure your medicines are working  Write down your questions so you remember to ask them during your visits  Go to cardiac rehab as directed:  Cardiac rehab is a program run by specialists who will help you safely strengthen your heart  The program includes exercise, relaxation, stress management, and heart-healthy nutrition  Healthcare providers will also make sure your medicines are helping to reduce your symptoms  Manage your HF:  · Do not smoke  Nicotine and other chemicals in cigarettes and cigars can cause lung damage and make HF difficult to manage  Ask your healthcare provider for information if you currently smoke and need help to quit  E-cigarettes or smokeless tobacco still contain nicotine  Talk to your healthcare provider before you use these products  · Do not drink alcohol or take illegal drugs  Alcohol and drugs can worsen your symptoms quickly  · Weigh yourself every morning  Use the same scale, in the same spot  Do this after you use the bathroom, but before you eat or drink anything  Wear the same type of clothing  Do not wear shoes  Record your weight each day so you will notice any sudden weight gain  Swelling and weight gain are signs of fluid retention  If you are overweight, ask how to lose weight safely  · Check your blood pressure and heart rate every day  Ask for more information about how to measure your blood pressure and heart rate correctly  Ask what these numbers should be for you  · Manage any chronic health conditions you have  These include high blood pressure, diabetes, obesity, high cholesterol, metabolic syndrome, and COPD  You will have fewer symptoms if you manage these health conditions  Follow your healthcare provider's recommendations and follow up with him or her regularly  · Eat heart-healthy foods and limit sodium (salt)  An easy way to do this is to eat more fresh fruits and vegetables and fewer canned and processed foods  Replace butter and margarine with heart-healthy oils such as olive oil and canola oil  Other heart-healthy foods include walnuts, whole-grain breads, low-fat dairy products, beans, and lean meats  Fatty fish such as salmon and tuna are also heart healthy  Ask how much salt you can eat each day  Do not use salt substitutes       · Drink liquids as directed  You may need to limit the amount of liquids you drink if you retain fluid  Ask how much liquid to drink each day and which liquids are best for you  · Stay active  If you are not active, your symptoms are likely to worsen quickly  Walking, bicycling, and other types of physical activity help maintain your strength and improve your mood  Physical activity also helps you manage your weight  Work with your healthcare provider to create an exercise plan that is right for you  · Get vaccines as directed  Get a flu shot every year  You may also need the pneumonia vaccine  The flu and pneumonia can be severe for a person who has HF  Vaccines protect you from these infections  Join a support group:  Living with HF can be difficult  It may be helpful to talk with others who have HF  You may learn how to better manage your condition or get emotional support  For more information:   · [address]  [place] , [place]   Phone: [phone]  Web Address: https://www strong com/  [employer]   © 2017 [address] [employer] Information is for End User's use only and may not be sold, redistributed or otherwise used for commercial purposes  All illustrations and images included in CareNotes® are the copyrighted property of A D A M , Inc  or [employer]  The above information is an  only  It is not intended as medical advice for individual conditions or treatments  Talk to your doctor, nurse or pharmacist before following any medical regimen to see if it is safe and effective for you  Low-Sodium Diet   WHAT YOU NEED TO KNOW:   A low-sodium diet limits foods that are high in sodium (salt)  You will need to follow a low-sodium diet if you have high blood pressure, kidney disease, or heart failure  You may also need to follow this diet if you have a condition that is causing your body to retain (hold) extra fluid   You may need to limit the amount of sodium you eat to 1,500 mg  Ask your healthcare provider how much sodium you can have each day  DISCHARGE INSTRUCTIONS:   How to use food labels to choose foods that are low in sodium:  Read food labels to find the amount of sodium they contain  The amount of sodium is listed in milligrams (mg)  The % Daily Value (DV) column tells you how much of your daily needs are met by 1 serving of the food for each nutrient listed  Choose foods that have less than 5% of the DV of sodium  These foods are considered low in sodium  Foods that have 20% or more of the DV of sodium are considered high in sodium  Some food labels may also list any of the following terms that tell you about the sodium content in the food:  Sodium-free:  Less than 5 mg in each serving    Very low sodium:  35 mg of sodium or less in each serving    Low sodium:  140 mg of sodium or less in each serving    Reduced sodium: At least 25% less sodium in each serving than the regular type    Light in sodium:  50% less sodium in each serving    Unsalted or no added salt:  No extra salt is added during processing (the food may still contain sodium)  Foods to avoid:  Salty foods are high in sodium   You should avoid the following:  Processed foods:      Mixes for cornbread, biscuits, cake, and pudding     Instant foods, such as potatoes, cereals, noodles, and rice     Packaged foods, such as bread stuffing, rice and pasta mixes, snack dip mixes, and macaroni and cheese     Canned foods, such as canned vegetables, soups, broths, sauces, and vegetable or tomato juice    Snack foods, such as salted chips, popcorn, pretzels, pork rinds, salted crackers, and salted nuts    Frozen foods, such as dinners, entrees, vegetables with sauces, and breaded meats    Sauerkraut, pickled vegetables, and other foods prepared in brine    Meats and cheeses:      Smoked or cured meat, such as corned beef, rincon, ham, hot dogs, and sausage    Canned meats or spreads, such as potted meats, sardines, anchovies, and imitation seafood    Deli or lunch meats, such as bologna, ham, turkey, and roast beef    Processed cheese, such as American cheese and cheese spreads    Condiments, sauces, and seasonings:      Salt (¼ teaspoon of salt contains 575 mg of sodium)    Seasonings made with salt, such as garlic salt, celery salt, onion salt, and seasoned salt    Regular soy sauce, barbecue sauce, teriyaki sauce, steak sauce, Worcestershire sauce, and most flavored vinegars    Canned gravy and mixes     Regular condiments, such as mustard, ketchup, and salad dressings    Pickles and olives    Meat tenderizers and monosodium glutamate (MSG)  Foods to include:  Read the food label to find the amount of sodium in each serving  Bread and cereal:  Try to choose breads with less than 80 mg of sodium per serving  Bread, roll, zoila, tortilla, or unsalted crackers  Ready-to-eat cereals with less than 5% DV of sodium (examples include shredded wheat and puffed rice)    Pasta    Vegetables and fruits:      Unsalted fresh, frozen, or canned vegetables    Fresh, frozen, or canned fruits    Fruit juice    Dairy:  One serving has about 150 mg of sodium  Milk, all types    Yogurt    Hard cheese, such as cheddar, Swiss, [employer], or [employer] and other protein foods:  Some raw meats may have added sodium  Plain meats, fish, and poultry     Egg    Other foods:      Homemade pudding    Unsalted nuts, popcorn, or pretzels    Unsalted butter or margarine  Ways to decrease sodium:   Add spices and herbs to foods instead of salt during cooking  Use salt-free seasonings to add flavor to foods  Examples include onion powder, garlic powder, basil, ruby powder, paprika, and parsley  Try lemon or lime juice or vinegar to give foods a tart flavor  Use hot peppers, pepper, or cayenne pepper to add a spicy flavor to foods  Do not keep a salt shaker at your kitchen table    This may help keep you from adding salt to food at the table  It may take time to get used to enjoying the natural flavor of food instead of adding salt  Talk to your healthcare provider before you use salt substitutes  Some salt substitutes have a high amount of potassium and need to be avoided if you have kidney disease  Choose low-sodium foods at restaurants  Meals from restaurants are often high in sodium  Some restaurants have nutrition information on the menu that tells you the amount of sodium in their foods  If possible, ask for your food to be prepared with less, or no salt  Shop for unsalted or low-sodium foods and snacks at the grocery store  Examples include unsalted or low-sodium broths, soups, and canned vegetables  Choose fresh or frozen vegetables instead  Choose unsalted nuts or seeds or fresh fruits or vegetables as snacks  Read food labels and choose salt-free, very low-sodium, or low-sodium foods  © 2017 [address] [employer] Information is for End User's use only and may not be sold, redistributed or otherwise used for commercial purposes  All illustrations and images included in CareNotes® are the copyrighted property of A D A M , Inc  or Pankaj Sheppard  The above information is an  only  It is not intended as medical advice for individual conditions or treatments  Talk to your doctor, nurse or pharmacist before following any medical regimen to see if it is safe and effective for you

## 2018-08-27 ENCOUNTER — TELEPHONE (OUTPATIENT)
Dept: CARDIOLOGY CLINIC | Facility: CLINIC | Age: 83
End: 2018-08-27

## 2018-08-30 ENCOUNTER — TELEPHONE (OUTPATIENT)
Dept: CARDIOLOGY CLINIC | Facility: CLINIC | Age: 83
End: 2018-08-30

## 2018-08-30 NOTE — TELEPHONE ENCOUNTER
Please ask patient to hold apixaban until he is seen in the office  Patient should go to the ER if urine becomes frankly bloody, if clots start passing, or if patient develops urge to urinate, but cannot (implying obstruction)      [address]

## 2018-08-30 NOTE — TELEPHONE ENCOUNTER
Spoke with patients wife rosy as per Nickolas Flow she is to hold the eliquis until patient can come to the office for a visit, also gave her instructions that he should go to the ED should there be fresh bloody urine, clots or if the patient cant pass urine  She did express concern about holding the Eliquis for that long patients follow up in on 9/11 please advise if it is okay for patient to hold for that long? Or should patient be seen sooner?

## 2018-08-30 NOTE — TELEPHONE ENCOUNTER
PT's wife [facility] called and informed me her  has blood in urine  She says this is  a reoccurring situation  She stopped Eliquis today and also states they do not want to go to the ED as this is something they have dealt with before  She saw Dr Cedrick Crisostomo at [facility] and has f/u with Dr Reynaldo Christianson on 9/11/18 please advise

## 2018-08-31 ENCOUNTER — APPOINTMENT (EMERGENCY)
Dept: CT IMAGING | Facility: HOSPITAL | Age: 83
End: 2018-08-31
Payer: MEDICARE

## 2018-08-31 ENCOUNTER — APPOINTMENT (EMERGENCY)
Dept: RADIOLOGY | Facility: HOSPITAL | Age: 83
End: 2018-08-31
Payer: MEDICARE

## 2018-08-31 ENCOUNTER — HOSPITAL ENCOUNTER (OUTPATIENT)
Facility: HOSPITAL | Age: 83
Setting detail: OBSERVATION
Discharge: HOME | End: 2018-09-01
Attending: EMERGENCY MEDICINE | Admitting: INTERNAL MEDICINE
Payer: MEDICARE

## 2018-08-31 DIAGNOSIS — R31.0 GROSS HEMATURIA: Primary | ICD-10-CM

## 2018-08-31 DIAGNOSIS — I50.22 CHRONIC SYSTOLIC CONGESTIVE HEART FAILURE (HCC): ICD-10-CM

## 2018-08-31 DIAGNOSIS — Z93.6 STATUS POST ILEAL CONDUIT (HCC): ICD-10-CM

## 2018-08-31 DIAGNOSIS — R53.1 GENERALIZED WEAKNESS: ICD-10-CM

## 2018-08-31 LAB
ABO GROUP BLD: NORMAL
ALBUMIN SERPL BCP-MCNC: 2.9 G/DL (ref 3.5–5)
ALP SERPL-CCNC: 94 U/L (ref 46–116)
ALT SERPL W P-5'-P-CCNC: 19 U/L (ref 12–78)
ANION GAP SERPL CALCULATED.3IONS-SCNC: 6 MMOL/L (ref 4–13)
APTT PPP: 37 SECONDS (ref 24–36)
AST SERPL W P-5'-P-CCNC: 21 U/L (ref 5–45)
BASOPHILS # BLD AUTO: 0.04 THOUSANDS/ΜL (ref 0–0.1)
BASOPHILS NFR BLD AUTO: 1 % (ref 0–1)
BILIRUB SERPL-MCNC: 0.6 MG/DL (ref 0.2–1)
BLD GP AB SCN SERPL QL: POSITIVE
BLOOD GROUP ANTIBODIES SERPL: NORMAL
BUN SERPL-MCNC: 40 MG/DL (ref 5–25)
CALCIUM SERPL-MCNC: 8.7 MG/DL (ref 8.3–10.1)
CHLORIDE SERPL-SCNC: 102 MMOL/L (ref 100–108)
CO2 SERPL-SCNC: 31 MMOL/L (ref 21–32)
CREAT SERPL-MCNC: 2.35 MG/DL (ref 0.6–1.3)
EOSINOPHIL # BLD AUTO: 0.15 THOUSAND/ΜL (ref 0–0.61)
EOSINOPHIL NFR BLD AUTO: 3 % (ref 0–6)
ERYTHROCYTE [DISTWIDTH] IN BLOOD BY AUTOMATED COUNT: 15.8 % (ref 11.6–15.1)
GFR SERPL CREATININE-BSD FRML MDRD: 24 ML/MIN/1.73SQ M
GLUCOSE SERPL-MCNC: 122 MG/DL (ref 65–140)
HCT VFR BLD AUTO: 35.5 % (ref 36.5–49.3)
HGB BLD-MCNC: 11.1 G/DL (ref 12–17)
IMM GRANULOCYTES # BLD AUTO: 0.01 THOUSAND/UL (ref 0–0.2)
IMM GRANULOCYTES NFR BLD AUTO: 0 % (ref 0–2)
INR PPP: 1.36 (ref 0.86–1.17)
LACTATE SERPL-SCNC: 1.1 MMOL/L (ref 0.5–2)
LIPASE SERPL-CCNC: 226 U/L (ref 73–393)
LYMPHOCYTES # BLD AUTO: 1.17 THOUSANDS/ΜL (ref 0.6–4.47)
LYMPHOCYTES NFR BLD AUTO: 21 % (ref 14–44)
MAGNESIUM SERPL-MCNC: 2 MG/DL (ref 1.6–2.6)
MCH RBC QN AUTO: 26.1 PG (ref 26.8–34.3)
MCHC RBC AUTO-ENTMCNC: 31.3 G/DL (ref 31.4–37.4)
MCV RBC AUTO: 83 FL (ref 82–98)
MONOCYTES # BLD AUTO: 0.68 THOUSAND/ΜL (ref 0.17–1.22)
MONOCYTES NFR BLD AUTO: 12 % (ref 4–12)
NEUTROPHILS # BLD AUTO: 3.5 THOUSANDS/ΜL (ref 1.85–7.62)
NEUTS SEG NFR BLD AUTO: 63 % (ref 43–75)
NRBC BLD AUTO-RTO: 0 /100 WBCS
NT-PROBNP SERPL-MCNC: ABNORMAL PG/ML
PLATELET # BLD AUTO: 153 THOUSANDS/UL (ref 149–390)
PMV BLD AUTO: 11.5 FL (ref 8.9–12.7)
POTASSIUM SERPL-SCNC: 3.3 MMOL/L (ref 3.5–5.3)
PROT SERPL-MCNC: 8.4 G/DL (ref 6.4–8.2)
PROTHROMBIN TIME: 16.7 SECONDS (ref 11.8–14.2)
RBC # BLD AUTO: 4.26 MILLION/UL (ref 3.88–5.62)
RH BLD: NEGATIVE
SODIUM SERPL-SCNC: 139 MMOL/L (ref 136–145)
SPECIMEN EXPIRATION DATE: NORMAL
TROPONIN I SERPL-MCNC: 0.02 NG/ML
WBC # BLD AUTO: 5.55 THOUSAND/UL (ref 4.31–10.16)

## 2018-08-31 PROCEDURE — 83735 ASSAY OF MAGNESIUM: CPT | Performed by: EMERGENCY MEDICINE

## 2018-08-31 PROCEDURE — 93005 ELECTROCARDIOGRAM TRACING: CPT

## 2018-08-31 PROCEDURE — 84484 ASSAY OF TROPONIN QUANT: CPT | Performed by: EMERGENCY MEDICINE

## 2018-08-31 PROCEDURE — 80053 COMPREHEN METABOLIC PANEL: CPT | Performed by: EMERGENCY MEDICINE

## 2018-08-31 PROCEDURE — 85025 COMPLETE CBC W/AUTO DIFF WBC: CPT | Performed by: EMERGENCY MEDICINE

## 2018-08-31 PROCEDURE — 83605 ASSAY OF LACTIC ACID: CPT | Performed by: EMERGENCY MEDICINE

## 2018-08-31 PROCEDURE — 85730 THROMBOPLASTIN TIME PARTIAL: CPT | Performed by: EMERGENCY MEDICINE

## 2018-08-31 PROCEDURE — 99220 PR INITIAL OBSERVATION CARE/DAY 70 MINUTES: CPT | Performed by: INTERNAL MEDICINE

## 2018-08-31 PROCEDURE — 86900 BLOOD TYPING SEROLOGIC ABO: CPT | Performed by: EMERGENCY MEDICINE

## 2018-08-31 PROCEDURE — 83690 ASSAY OF LIPASE: CPT | Performed by: EMERGENCY MEDICINE

## 2018-08-31 PROCEDURE — 83880 ASSAY OF NATRIURETIC PEPTIDE: CPT | Performed by: EMERGENCY MEDICINE

## 2018-08-31 PROCEDURE — 99285 EMERGENCY DEPT VISIT HI MDM: CPT

## 2018-08-31 PROCEDURE — 36415 COLL VENOUS BLD VENIPUNCTURE: CPT | Performed by: EMERGENCY MEDICINE

## 2018-08-31 PROCEDURE — 74176 CT ABD & PELVIS W/O CONTRAST: CPT

## 2018-08-31 PROCEDURE — 86850 RBC ANTIBODY SCREEN: CPT | Performed by: EMERGENCY MEDICINE

## 2018-08-31 PROCEDURE — 71046 X-RAY EXAM CHEST 2 VIEWS: CPT

## 2018-08-31 PROCEDURE — 85610 PROTHROMBIN TIME: CPT | Performed by: EMERGENCY MEDICINE

## 2018-08-31 PROCEDURE — 86870 RBC ANTIBODY IDENTIFICATION: CPT | Performed by: EMERGENCY MEDICINE

## 2018-08-31 PROCEDURE — 86901 BLOOD TYPING SEROLOGIC RH(D): CPT | Performed by: EMERGENCY MEDICINE

## 2018-08-31 RX ORDER — ACETAMINOPHEN 325 MG/1
650 TABLET ORAL EVERY 6 HOURS PRN
Status: DISCONTINUED | OUTPATIENT
Start: 2018-08-31 | End: 2018-09-01 | Stop reason: HOSPADM

## 2018-08-31 RX ORDER — POTASSIUM CHLORIDE 20MEQ/15ML
40 LIQUID (ML) ORAL ONCE
Status: COMPLETED | OUTPATIENT
Start: 2018-08-31 | End: 2018-08-31

## 2018-08-31 RX ORDER — FUROSEMIDE 40 MG/1
40 TABLET ORAL 2 TIMES DAILY
Status: DISCONTINUED | OUTPATIENT
Start: 2018-08-31 | End: 2018-09-01 | Stop reason: HOSPADM

## 2018-08-31 RX ORDER — CARVEDILOL 6.25 MG/1
6.25 TABLET ORAL 2 TIMES DAILY WITH MEALS
Status: DISCONTINUED | OUTPATIENT
Start: 2018-08-31 | End: 2018-09-01 | Stop reason: HOSPADM

## 2018-08-31 RX ORDER — ONDANSETRON 2 MG/ML
4 INJECTION INTRAMUSCULAR; INTRAVENOUS EVERY 6 HOURS PRN
Status: DISCONTINUED | OUTPATIENT
Start: 2018-08-31 | End: 2018-09-01 | Stop reason: HOSPADM

## 2018-08-31 RX ORDER — POTASSIUM CHLORIDE 20 MEQ/1
20 TABLET, EXTENDED RELEASE ORAL 2 TIMES DAILY
Status: DISCONTINUED | OUTPATIENT
Start: 2018-08-31 | End: 2018-09-01 | Stop reason: HOSPADM

## 2018-08-31 RX ORDER — POTASSIUM CHLORIDE 20 MEQ/1
40 TABLET, EXTENDED RELEASE ORAL ONCE
Status: DISCONTINUED | OUTPATIENT
Start: 2018-08-31 | End: 2018-08-31

## 2018-08-31 RX ORDER — CARVEDILOL 6.25 MG/1
6.25 TABLET ORAL 2 TIMES DAILY WITH MEALS
Status: DISCONTINUED | OUTPATIENT
Start: 2018-09-01 | End: 2018-08-31

## 2018-08-31 RX ADMIN — POTASSIUM CHLORIDE 40 MEQ: 20 SOLUTION ORAL at 17:38

## 2018-08-31 RX ADMIN — CEFTRIAXONE SODIUM 1000 MG: 10 INJECTION, POWDER, FOR SOLUTION INTRAVENOUS at 18:00

## 2018-08-31 RX ADMIN — FUROSEMIDE 40 MG: 40 TABLET ORAL at 20:57

## 2018-08-31 RX ADMIN — CARVEDILOL 6.25 MG: 6.25 TABLET, FILM COATED ORAL at 21:03

## 2018-08-31 NOTE — TELEPHONE ENCOUNTER
SHILOH FROM [facility] CALLED TO REPORT PT OXYGEN WAS %  HR  THE NEXT READING WAS 84% OXYGEN AND 50 HR, THE THIRD TIME WAS 92% OXYGEN AND 70HR  SHILOH SAID PT URINE IS ALL RED WITH BLOOD  TRANSFERRED CALL TO RENETTA

## 2018-08-31 NOTE — TELEPHONE ENCOUNTER
LM for wife as per Dr Arora Fat pt can hold the eliquis until his next zeke on 9/11  I advised her to call back with more questions or concerns

## 2018-08-31 NOTE — TELEPHONE ENCOUNTER
It is ok to hold the apixaban for that long  His CHADS2-Vasc score is 7 (CHF, HTN, Age x2, CVA x2, Vascular disease), which translates to an 11 2% risk of systemic embolism per year  His daily risk of CVA is 0 03%, which is exceedingly low

## 2018-08-31 NOTE — ED PROVIDER NOTES
History  Chief Complaint   Patient presents with    Blood in Urine     pt with blood in urine, was just started on eliquis      Patient is an 27-year-old male with past medical history significant for bladder cancer status post cystectomy, chemotherapy, radiation and has a current ileal conduit/urostomy, atrial fibrillation on Eliquis, coronary artery disease status post CABG, prior cerebellar stroke, congestive heart failure, stage 4 kidney disease, hypertension, hepatitis-C, COPD on home oxygen PRN, presents to the emergency department with his wife for gross hematuria coming from his urostomy/ileal conduit  Patient's wife provided majority of the history and states that he was recently discharged from the hospital this past Saturday for congestive heart failure exacerbation  He states he felt fine on Saturday however earlier in the week, about Wednesday he started having grossly bloody urine output from his ileal conduit/urostomy bag  He states the hematuria has been persistent since then  He spoke with his cardiologist, Dr Fredo Ceja, who recommended he stop taking his Eliquis until he is seen by his cardiologist on 09/11  Patient states he has not taken his Eliquis since Wednesday but is still having hematuria  He states over the past 2 days he has also felt generally weak and mildly short of breath  He denies ever having this type of hematuria before  Denies any current passage of blood clots  He denies being on any other blood thinners other than the Eliquis  He denies any fever, but reports chills  He denies headache, dizziness or syncope, URI symptoms, worsening of chronic cough, hemoptysis, chest pain, palpitations, abdominal pain or distention, nausea, vomiting, diarrhea, constipation, blood per rectum, melena, flank pain, skin rash or color change, lateralizing weakness or extremity paresthesia or other focal neurologic deficits    He does follow with a urologist and according to records, he was last seen in mid July  History provided by:  Patient, spouse and medical records   used: No        Prior to Admission Medications   Prescriptions Last Dose Informant Patient Reported? Taking? albuterol (PROVENTIL HFA,VENTOLIN HFA) 90 mcg/act inhaler  Self Yes No   Sig: Inhale 2 puffs every 6 (six) hours as needed for wheezing   apixaban (ELIQUIS) 2 5 mg   No No   Sig: Take 1 tablet (2 5 mg total) by mouth 2 (two) times a day   carvedilol (COREG) 6 25 mg tablet  Self Yes No   Sig: Take 6 25 mg by mouth 2 (two) times a day with meals   citalopram (CeleXA) 10 mg tablet   No No   Sig: Take 1 tablet (10 mg total) by mouth daily   furosemide (LASIX) 40 mg tablet   No No   Sig: Take 1 tablet (40 mg total) by mouth 2 (two) times a day   potassium chloride (K-DUR,KLOR-CON) 20 mEq tablet   No No   Sig: Take 1 tablet (20 mEq total) by mouth 2 (two) times a day      Facility-Administered Medications: None       Past Medical History:   Diagnosis Date    Atrial fibrillation (HCC)     CAD (coronary artery disease)     Cancer (HCC)     bladder    Cerebellar stroke, acute ([facility] [address] ) 7/16/2017    CHF (congestive heart failure) (HCC)     Chronic kidney disease     COPD (chronic obstructive pulmonary disease) (HCC)     Hepatitis C     Hypertension     TIA (transient ischemic attack)        Past Surgical History:   Procedure Laterality Date    CAROTID ARTERY ANGIOPLASTY      COLONOSCOPY N/A 3/24/2017    Procedure: COLONOSCOPY;  Surgeon: Carly Dick MD;  Location: [facility]; Service:     CORONARY ARTERY BYPASS GRAFT      CYSTECTOMY      ESOPHAGOGASTRODUODENOSCOPY N/A 3/23/2017    Procedure: ESOPHAGOGASTRODUODENOSCOPY (EGD); Surgeon: Carly Dick MD;  Location: [facility]; Service:     EYE SURGERY      ILEO CONDUIT         Family History   Problem Relation Age of Onset    Coronary artery disease Mother      I have reviewed and agree with the history as documented      Social History Substance Use Topics    Smoking status: Former Smoker     Quit date: 2/28/1973    Smokeless tobacco: Former User    Alcohol use No        Review of Systems   Constitutional: Positive for chills and fatigue  Negative for diaphoresis and fever  HENT: Negative for congestion, ear pain, rhinorrhea and sore throat  Eyes: Negative for photophobia, pain and visual disturbance  Respiratory: Positive for shortness of breath  Negative for cough, chest tightness and wheezing  Cardiovascular: Negative for chest pain and palpitations  Gastrointestinal: Negative for abdominal distention, abdominal pain, blood in stool, constipation, diarrhea, nausea and vomiting  Genitourinary: Positive for hematuria  Negative for dysuria, flank pain, frequency, scrotal swelling and testicular pain  Musculoskeletal: Negative for back pain, neck pain and neck stiffness  Skin: Negative for color change, pallor, rash and wound  Allergic/Immunologic: Negative for immunocompromised state  Neurological: Positive for weakness  Negative for dizziness, syncope, facial asymmetry, speech difficulty, light-headedness, numbness and headaches  Hematological: Negative for adenopathy  Bruises/bleeds easily  Psychiatric/Behavioral: Negative for confusion and decreased concentration  All other systems reviewed and are negative  Physical Exam  Physical Exam   Constitutional: He is oriented to person, place, and time  He appears well-developed and well-nourished  No distress  HENT:   Head: Normocephalic and atraumatic  Mouth/Throat: Oropharynx is clear and moist  No oropharyngeal exudate  Eyes: Conjunctivae and EOM are normal  Pupils are equal, round, and reactive to light  Neck: Normal range of motion  Neck supple  No JVD present  Cardiovascular: Normal rate, normal heart sounds and intact distal pulses  Exam reveals no gallop and no friction rub  No murmur heard  Irregularly irregular rhythm     Pulmonary/Chest: Effort normal and breath sounds normal  No respiratory distress  He has no wheezes  He has no rales  He exhibits no tenderness  Abdominal: Soft  Bowel sounds are normal  He exhibits no distension  There is no tenderness  There is no rebound and no guarding  Skin around urostomy/ileal conduit stoma appears clean and nonerythematous  Urostomy bag is filled with dark blood  No CVA tenderness  No significant abdominal tenderness  Musculoskeletal: Normal range of motion  He exhibits no edema or tenderness  Lymphadenopathy:     He has no cervical adenopathy  Neurological: He is alert and oriented to person, place, and time  No gross motor or sensory deficits  Skin: Skin is warm and dry  No rash noted  He is not diaphoretic  No pallor  Psychiatric: He has a normal mood and affect  His behavior is normal    Nursing note and vitals reviewed        Vital Signs  ED Triage Vitals   Temperature Pulse Respirations Blood Pressure SpO2   08/31/18 1657 08/31/18 1441 08/31/18 1441 08/31/18 1441 08/31/18 1441   97 9 °F (36 6 °C) 79 18 170/77 99 %      Temp Source Heart Rate Source Patient Position - Orthostatic VS BP Location FiO2 (%)   08/31/18 1441 08/31/18 1441 08/31/18 1441 08/31/18 1441 --   Oral Monitor Sitting Left arm       Pain Score       08/31/18 1441       No Pain         Vitals:    08/31/18 1657 08/31/18 1700 08/31/18 1730 08/31/18 1823   BP: 142/93 142/93 151/79 154/76   BP Location: Left arm   Left arm   Pulse: 79 82 79 66   Resp: (!) 28 18 18    Temp: 97 9 °F (36 6 °C)      TempSrc: Oral      SpO2: 100% 99% 98% 98%   Weight:       Height:         Visual Acuity      ED Medications  Medications   potassium chloride 10 % oral solution 40 mEq (40 mEq Oral Given 8/31/18 1738)   cefTRIAXone (ROCEPHIN) 1,000 mg in dextrose 5 % 50 mL IVPB (1,000 mg Intravenous New Bag 8/31/18 1800)       Diagnostic Studies  Results Reviewed     Procedure Component Value Units Date/Time    Magnesium [[id number]]  (Normal) Collected:  08/31/18 1620    Lab Status:  Final result Specimen:  Blood from Arm, Right Updated:  08/31/18 1649     Magnesium 2 0 mg/dL     Lipase [[id number]]  (Normal) Collected:  08/31/18 1620    Lab Status:  Final result Specimen:  Blood from Arm, Right Updated:  08/31/18 1649     Lipase 226 u/L     Troponin I [[id number]]  (Normal) Collected:  08/31/18 1620    Lab Status:  Final result Specimen:  Blood from Arm, Right Updated:  08/31/18 1649     Troponin I 0 02 ng/mL     B-type natriuretic peptide [[id number]]  (Abnormal) Collected:  08/31/18 1620    Lab Status:  Final result Specimen:  Blood from Arm, Right Updated:  08/31/18 1649     NT-proBNP 11,821 (H) pg/mL     Lactic acid, plasma [[id number]]  (Normal) Collected:  08/31/18 1620    Lab Status:  Final result Specimen:  Blood from Arm, Right Updated:  08/31/18 1648     LACTIC ACID 1 1 mmol/L     Narrative:         Result may be elevated if tourniquet was used during collection  Comprehensive metabolic panel [[id number]]  (Abnormal) Collected:  08/31/18 1620    Lab Status:  Final result Specimen:  Blood from Arm, Right Updated:  08/31/18 1642     Sodium 139 mmol/L      Potassium 3 3 (L) mmol/L      Chloride 102 mmol/L      CO2 31 mmol/L      ANION GAP 6 mmol/L      BUN 40 (H) mg/dL      Creatinine 2 35 (H) mg/dL      Glucose 122 mg/dL      Calcium 8 7 mg/dL      AST 21 U/L      ALT 19 U/L      Alkaline Phosphatase 94 U/L      Total Protein 8 4 (H) g/dL      Albumin 2 9 (L) g/dL      Total Bilirubin 0 60 mg/dL      eGFR 24 ml/min/1 73sq m     Narrative:         National Kidney Disease Education Program recommendations are as follows:  GFR calculation is accurate only with a steady state creatinine  Chronic Kidney disease less than 60 ml/min/1 73 sq  meters  Kidney failure less than 15 ml/min/1 73 sq  meters      APTT [[id number]]  (Abnormal) Collected:  08/31/18 1620    Lab Status:  Final result Specimen:  Blood from Arm, Right Updated:  08/31/18 1639     PTT 37 (H) seconds     Protime-INR [[id number]]  (Abnormal) Collected:  08/31/18 1620    Lab Status:  Final result Specimen:  Blood from Arm, Right Updated:  08/31/18 1639     Protime 16 7 (H) seconds      INR 1 36 (H)    CBC and differential [[id number]]  (Abnormal) Collected:  08/31/18 1620    Lab Status:  Final result Specimen:  Blood from Arm, Right Updated:  08/31/18 1628     WBC 5 55 Thousand/uL      RBC 4 26 Million/uL      Hemoglobin 11 1 (L) g/dL      Hematocrit 35 5 (L) %      MCV 83 fL      MCH 26 1 (L) pg      MCHC 31 3 (L) g/dL      RDW 15 8 (H) %      MPV 11 5 fL      Platelets 206 Thousands/uL      nRBC 0 /100 WBCs      Neutrophils Relative 63 %      Immat GRANS % 0 %      Lymphocytes Relative 21 %      Monocytes Relative 12 %      Eosinophils Relative 3 %      Basophils Relative 1 %      Neutrophils Absolute 3 50 Thousands/µL      Immature Grans Absolute 0 01 Thousand/uL      Lymphocytes Absolute 1 17 Thousands/µL      Monocytes Absolute 0 68 Thousand/µL      Eosinophils Absolute 0 15 Thousand/µL      Basophils Absolute 0 04 Thousands/µL                  CT abdomen pelvis wo contrast   Final Result by Gerry Yeboah MD (08/31 1714)      Right lower quadrant ileal conduit without complications  Polycystic kidneys unchanged from the prior study  No new renal findings  Persistent partially imaged left basilar effusion and resolved right basilar effusion  Previously described supraumbilical hernia no longer contains a segment of bowel  Workstation performed: [id number]         XR chest 2 views   Final Result by Maria Elena Curtis MD (08/31 3308)      Moderate cardiomegaly with mild interstitial pulmonary edema  Stable left pleural effusion with left lower lobe atelectasis  Superimposed pneumonia is not entirely excluded              Workstation performed: [id number]                    Procedures  ECG 12 Lead Documentation  Date/Time: 8/31/2018 6:34 PM  Performed by: Aline Waggoner JAVI BAIG  Authorized by: Rekha Vasquez     ECG reviewed by me, the ED Provider: yes    Patient location:  ED  Previous ECG:     Previous ECG:  Compared to current    Comparison ECG info:  8-24-18    Similarity:  Changes noted  Rate:     ECG rate:  80    ECG rate assessment: normal    Rhythm:     Rhythm: atrial flutter    Ectopy:     Ectopy: PVCs      PVCs:  Infrequent  QRS:     QRS axis:  Right    QRS intervals: Wide  Conduction:     Conduction: abnormal      Abnormal conduction: complete RBBB, LPFB and bifascicular block    ST segments:     ST segments:  Normal  T waves:     T waves: normal    Comments:      [address]           Phone Contacts  ED Phone Contact    ED Course  ED Course as of Aug 31 1837   Fri Aug 31, 2018   1630 Stable from 1 week ago  Hemoglobin: (!) 11 1   1630 Platelets: 720   7413 Improved from 1 week ago  NT-proBNP: (!) 11,821   1715 Kidney function stable  Creatinine: (!) 2 35   [id number] Spoke with Dr Silvina Bowie, on-call urologist, who did recommend observing patient inpatient overnight to trend hemoglobin and monitoring symptoms  He also recommended empirically treating with antibiotics  Identification of Seniors at Risk      Most Recent Value   (ISAR) Identification of Seniors at Risk   Before the illness or injury that brought you to the Emergency, did you need someone to help you on a regular basis? 1 Filed at: 08/31/2018 1441   In the last 24 hours, have you needed more help than usual?  1 Filed at: 08/31/2018 1441   Have you been hospitalized for one or more nights during the past 6 months? 1 Filed at: 08/31/2018 1441   In general, do you see well?  0 Filed at: 08/31/2018 1441   In general, do you have serious problems with your memory? 0 Filed at: 08/31/2018 1441   Do you take more than three different medications every day?   1 Filed at: 08/31/2018 1441   ISAR Score  4 Filed at: 08/31/2018 1441                          MDM  Number of Diagnoses or Management Options  Generalized weakness:   Gross hematuria:   Status post ileal conduit [facility]):   Diagnosis management comments: 80-year-old male currently on Eliquis which has been on hold for 2 days, presents with 2 days of gross hematuria coming from ileal conduit/urostomy  Will do full septic workup and check cardiac labs given his other complaints of dyspnea and generalized weakness  This could be secondary to acute blood loss anemia  Will ultimately consult Urology but most likely admit for serial hemoglobin and urology consultation  Will obtain a noncontrast CT scan of the abdomen and pelvis to evaluate for possible source of bleeding or evidence of pyelo  I do not feel urine specimen will be of use as it will likely be contaminated so will hold off at this time  Amount and/or Complexity of Data Reviewed  Clinical lab tests: ordered and reviewed  Tests in the radiology section of CPT®: ordered and reviewed  Tests in the medicine section of CPT®: ordered and reviewed  Obtain history from someone other than the patient: yes  Review and summarize past medical records: yes  Discuss the patient with other providers: yes  Independent visualization of images, tracings, or specimens: yes      CritCare Time    Disposition  Final diagnoses:   Gross hematuria   Status post ileal conduit (HCC)   Generalized weakness     Time reflects when diagnosis was documented in both MDM as applicable and the Disposition within this note     Time User Action Codes Description Comment    8/31/2018  5:58 PM Julio To E Add [R31 0] Gross hematuria     8/31/2018  5:58 PM Julio Stephonu E Add [Z93 6] Status post ileal conduit ([facility] [address] )     8/31/2018  5:58 PM Julio Stephonu E Add [R53 1] Generalized weakness       ED Disposition     ED Disposition Condition Comment    Admit  Case was discussed with KAREN and the patient's admission status was agreed to be Admission Status: observation status to the service of Dr Alicia Wise           Follow-up Information    None Patient's Medications   Discharge Prescriptions    No medications on file     No discharge procedures on file      ED Provider  Electronically Signed by           Rhiannon Duke DO  08/31/18 [id number]

## 2018-09-01 VITALS
SYSTOLIC BLOOD PRESSURE: 136 MMHG | TEMPERATURE: 98 F | WEIGHT: 153.66 LBS | HEART RATE: 68 BPM | RESPIRATION RATE: 18 BRPM | DIASTOLIC BLOOD PRESSURE: 74 MMHG | BODY MASS INDEX: 22.76 KG/M2 | HEIGHT: 69 IN | OXYGEN SATURATION: 97 %

## 2018-09-01 PROBLEM — E87.6 HYPOKALEMIA: Status: ACTIVE | Noted: 2018-09-01

## 2018-09-01 LAB
ANION GAP SERPL CALCULATED.3IONS-SCNC: 5 MMOL/L (ref 4–13)
BASOPHILS # BLD AUTO: 0.04 THOUSANDS/ΜL (ref 0–0.1)
BASOPHILS NFR BLD AUTO: 1 % (ref 0–1)
BUN SERPL-MCNC: 35 MG/DL (ref 5–25)
CALCIUM SERPL-MCNC: 8.5 MG/DL (ref 8.3–10.1)
CHLORIDE SERPL-SCNC: 103 MMOL/L (ref 100–108)
CO2 SERPL-SCNC: 29 MMOL/L (ref 21–32)
CREAT SERPL-MCNC: 2.16 MG/DL (ref 0.6–1.3)
EOSINOPHIL # BLD AUTO: 0.15 THOUSAND/ΜL (ref 0–0.61)
EOSINOPHIL NFR BLD AUTO: 3 % (ref 0–6)
ERYTHROCYTE [DISTWIDTH] IN BLOOD BY AUTOMATED COUNT: 15.6 % (ref 11.6–15.1)
GFR SERPL CREATININE-BSD FRML MDRD: 26 ML/MIN/1.73SQ M
GLUCOSE P FAST SERPL-MCNC: 89 MG/DL (ref 65–99)
GLUCOSE SERPL-MCNC: 89 MG/DL (ref 65–140)
HCT VFR BLD AUTO: 33.6 % (ref 36.5–49.3)
HGB BLD-MCNC: 10.6 G/DL (ref 12–17)
IMM GRANULOCYTES # BLD AUTO: 0.02 THOUSAND/UL (ref 0–0.2)
IMM GRANULOCYTES NFR BLD AUTO: 0 % (ref 0–2)
LYMPHOCYTES # BLD AUTO: 1.44 THOUSANDS/ΜL (ref 0.6–4.47)
LYMPHOCYTES NFR BLD AUTO: 29 % (ref 14–44)
MCH RBC QN AUTO: 26 PG (ref 26.8–34.3)
MCHC RBC AUTO-ENTMCNC: 31.5 G/DL (ref 31.4–37.4)
MCV RBC AUTO: 83 FL (ref 82–98)
MONOCYTES # BLD AUTO: 0.67 THOUSAND/ΜL (ref 0.17–1.22)
MONOCYTES NFR BLD AUTO: 14 % (ref 4–12)
NEUTROPHILS # BLD AUTO: 2.64 THOUSANDS/ΜL (ref 1.85–7.62)
NEUTS SEG NFR BLD AUTO: 53 % (ref 43–75)
NRBC BLD AUTO-RTO: 0 /100 WBCS
PLATELET # BLD AUTO: 129 THOUSANDS/UL (ref 149–390)
PMV BLD AUTO: 11.9 FL (ref 8.9–12.7)
POTASSIUM SERPL-SCNC: 3.5 MMOL/L (ref 3.5–5.3)
RBC # BLD AUTO: 4.07 MILLION/UL (ref 3.88–5.62)
SODIUM SERPL-SCNC: 137 MMOL/L (ref 136–145)
WBC # BLD AUTO: 4.96 THOUSAND/UL (ref 4.31–10.16)

## 2018-09-01 PROCEDURE — 85025 COMPLETE CBC W/AUTO DIFF WBC: CPT | Performed by: INTERNAL MEDICINE

## 2018-09-01 PROCEDURE — 99214 OFFICE O/P EST MOD 30 MIN: CPT | Performed by: UROLOGY

## 2018-09-01 PROCEDURE — 99217 PR OBSERVATION CARE DISCHARGE MANAGEMENT: CPT | Performed by: INTERNAL MEDICINE

## 2018-09-01 PROCEDURE — 80048 BASIC METABOLIC PNL TOTAL CA: CPT | Performed by: INTERNAL MEDICINE

## 2018-09-01 RX ADMIN — FUROSEMIDE 40 MG: 40 TABLET ORAL at 08:59

## 2018-09-01 RX ADMIN — CARVEDILOL 6.25 MG: 6.25 TABLET, FILM COATED ORAL at 08:59

## 2018-09-01 RX ADMIN — POTASSIUM CHLORIDE 20 MEQ: 1500 TABLET, EXTENDED RELEASE ORAL at 08:59

## 2018-09-01 NOTE — H&P
H&P- Kp Hall 6/21/1930, 80 y o  male MRN: [id number]    Unit/Bed#: -01 Encounter: [id number]    Primary Care Provider: Itz Francisco MD   Date and time admitted to hospital: 8/31/2018  3:40 PM        Gross hematuria   Assessment & Plan    Patient has history of hematuria in the past and GI bleed on Eliquis  Patient recently restarted Eliquis and has developed gross hematuria  He spoke with Cardiology who recommended stopping the medication however he continued to have hematuria and presented to the hospital for admission  Urology will see the patient in the a m  Malignant neoplasm of urinary bladder [facility])   Assessment & Plan    As stated Urology to assess in a m         CAD (coronary artery disease)   Assessment & Plan    A patient recently was discharged from the hospital for exacerbation of CHF  Currently the seems to be compensated will continue to monitor  Will ask Cardiology to assist as patient feels that his medications are causing him generalized weakness  Will continue Coreg and Lasix  His        Status post ileal conduit [facility])   Assessment & Plan    Patient with known bladder cancer ileal conduit in place positive dark wine colored hematuria  Currently holding anticoagulation  Urology will see in the a m  Chronic kidney disease (CKD), stage IV (severe) ([facility])   Assessment & Plan    At baseline creatinine of 2 35  Continue to monitor with serial        Chronic systolic congestive heart failure ([address] )   Assessment & Plan    Currently appears compensated  Patient states he did not take his Lasix this morning  As he has been feeling generally weak  Exam does not show volume overload at this time  Will consult Cardiology to help manage his heart failure        Atrial fibrillation [facility])   Assessment & Plan    Rate controlled on Coreg continue to monitor        Hypokalemia   Assessment & Plan    Repleted p o  In emergency room  Will continue following replete  VTE Prophylaxis: Pharmacologic VTE Prophylaxis contraindicated due to Hematuria  / sequential compression device   Code Status:  Full code at patient's request   Patient confirmed that last dizzy which resuscitate but he has we thought about this process  POLST: POLST form is not discussed and not completed at this time  Discussion with family:  Spouse updated by patient while I was present on the phone    Anticipated Length of Stay:  Patient will be admitted on an Observation basis with an anticipated length of stay of  less than 2 midnights  Justification for Hospital Stay:  To evaluate by Urology and monitor hematuria    Total Time for Visit, including Counseling / Coordination of Care: 1 hour  Greater than 50% of this total time spent on direct patient counseling and coordination of care  Chief Complaint:   Hematuria painless    History of Present Illness:    Charles Smith is a 80 y o  male who presents with hematuria x2 days  Patient spoke with his cardiologist and was told to discontinue Eliquis which was recently restarted  Patient did so and thought that the symptoms were improving however on the day of admission he noted there was an increase in the amount and color of the hematuria and therefore he came to the hospital   Urology would like him evaluated overnight  Review of Systems:    Review of Systems   Constitutional: Negative for appetite change, chills, diaphoresis, fatigue, fever and unexpected weight change  HENT: Negative for dental problem, ear discharge, ear pain, facial swelling, hearing loss, mouth sores, nosebleeds and rhinorrhea  Eyes: Negative for pain, discharge, redness and visual disturbance  Respiratory: Negative for cough, chest tightness, shortness of breath and wheezing  Cardiovascular: Negative for chest pain, palpitations and leg swelling     Gastrointestinal: Negative for abdominal distention, abdominal pain, blood in stool, constipation, diarrhea, nausea and vomiting  Endocrine: Negative for cold intolerance, heat intolerance, polydipsia, polyphagia and polyuria  Genitourinary: Positive for hematuria  Negative for dysuria, frequency and urgency  Musculoskeletal: Negative for arthralgias and back pain  Skin: Negative for rash and wound  Neurological: Positive for weakness  Negative for dizziness, light-headedness, numbness and headaches  Hematological: Negative for adenopathy  Does not bruise/bleed easily  Psychiatric/Behavioral: Negative for confusion and dysphoric mood  Past Medical and Surgical History:     Past Medical History:   Diagnosis Date    [facility] fibrillation ([facility] )     CAD (coronary artery disease)     Cancer ([place] [address] )     bladder    Cerebellar stroke, acute ([place] [address] ) 7/16/2017    CHF (congestive heart failure) (HCC)     Chronic kidney disease     COPD (chronic obstructive pulmonary disease) (HCC)     Hepatitis C     Hypertension     TIA (transient ischemic attack)        Past Surgical History:   Procedure Laterality Date    CAROTID ARTERY ANGIOPLASTY      COLONOSCOPY N/A 3/24/2017    Procedure: COLONOSCOPY;  Surgeon: Yanci Gaston MD;  Location: [facility]; Service:     CORONARY ARTERY BYPASS GRAFT      CYSTECTOMY      ESOPHAGOGASTRODUODENOSCOPY N/A 3/23/2017    Procedure: ESOPHAGOGASTRODUODENOSCOPY (EGD); Surgeon: Yanci Gaston MD;  Location: [facility]; Service:    [facility] EYE SURGERY      ILEO CONDUIT         Meds/Allergies:    Prior to Admission medications    Medication Sig Start Date End Date Taking?  Authorizing Provider   carvedilol (COREG) 6 25 mg tablet Take 6 25 mg by mouth 2 (two) times a day with meals   Yes Historical Provider, MD   citalopram (CeleXA) 10 mg tablet Take 1 tablet (10 mg total) by mouth daily 8/26/18  Yes Onofre Antunez PA-C   furosemide (LASIX) 40 mg tablet Take 1 tablet (40 mg total) by mouth 2 (two) times a day 8/26/18  Yes Vanessa Patel PA-C   potassium chloride (K-DUR,KLOR-CON) 20 mEq tablet Take 1 tablet (20 mEq total) by mouth 2 (two) times a day 8/26/18  Yes Franklyn Robins PA-C     I have reviewed home medications with patient personally  Allergies: No Known Allergies    Social History:     Marital Status:  rosy  Occupation:  Owns a [employer]  Patient Pre-hospital Living Situation: This at home without restriction  Patient Pre-hospital Level of Mobility:  Gets around generally with house assistance  Patient Pre-hospital Diet Restrictions:  None  Substance Use History:   History   Alcohol Use No     History   Smoking Status    Former Smoker    Quit date: 2/28/1973   Smokeless Tobacco    Former User     History   Drug Use No       Family History:    Family History   Problem Relation Age of Onset    Coronary artery disease Mother        Physical Exam:     Vitals:   Blood Pressure: 114/63 (08/31/18 2300)  Pulse: 76 (08/31/18 2300)  Temperature: 98 1 °F (36 7 °C) (08/31/18 2300)  Temp Source: Oral (08/31/18 2300)  Respirations: 18 (08/31/18 2300)  Height: 5' 9" (175 3 cm) (08/31/18 1841)  Weight - Scale: 69 7 kg (153 lb 10 6 oz) (08/31/18 2014)  SpO2: 94 % (08/31/18 2300)    Physical Exam   Constitutional: He is oriented to person, place, and time  He appears well-developed  No distress  Thin disheveled   HENT:   Head: Normocephalic and atraumatic  Right Ear: External ear normal    Left Ear: External ear normal    Nose: Nose normal    Mouth/Throat: Oropharynx is clear and moist  No oropharyngeal exudate  Eyes: Conjunctivae and EOM are normal  Pupils are equal, round, and reactive to light  Right eye exhibits no discharge  Left eye exhibits no discharge  No scleral icterus  Neck: Normal range of motion  Neck supple  No JVD present  No thyromegaly present  Cardiovascular: Normal rate, regular rhythm, normal heart sounds and intact distal pulses  Exam reveals no gallop and no friction rub  No murmur heard    Pulmonary/Chest: Effort normal and breath sounds normal  No respiratory distress  He has no wheezes  He has no rales  Abdominal: Soft  Bowel sounds are normal  He exhibits no distension  There is no tenderness  There is no rebound and no guarding  Mildly tender in the right inguinal area consistent with the inguinal ring no evidence for hernia noted  Right-sided ileal conduit present with some cranberry colored blood  Stoma pink   Musculoskeletal: Normal range of motion  He exhibits no edema or deformity  Lymphadenopathy:     He has no cervical adenopathy  Neurological: He is alert and oriented to person, place, and time  He has normal reflexes  No cranial nerve deficit  He exhibits normal muscle tone  Skin: Skin is warm and dry  No rash noted  He is not diaphoretic  No erythema  Psychiatric: He has a normal mood and affect  Vitals reviewed  Additional Data:     Lab Results: I have personally reviewed pertinent reports  Results from last 7 days  Lab Units 08/31/18  1620   WBC Thousand/uL 5 55   HEMOGLOBIN g/dL 11 1*   HEMATOCRIT % 35 5*   PLATELETS Thousands/uL 153   NEUTROS PCT % 63   LYMPHS PCT % 21   MONOS PCT % 12   EOS PCT % 3       Results from last 7 days  Lab Units 08/31/18  1620   SODIUM mmol/L 139   POTASSIUM mmol/L 3 3*   CHLORIDE mmol/L 102   CO2 mmol/L 31   BUN mg/dL 40*   CREATININE mg/dL 2 35*   CALCIUM mg/dL 8 7   ALK PHOS U/L 94   ALT U/L 19   AST U/L 21       Results from last 7 days  Lab Units 08/31/18  1620   INR  1 36*               Imaging: I have personally reviewed pertinent reports  CT abdomen pelvis wo contrast   Final Result by Sunni Altamirano MD (08/31 8224)      Right lower quadrant ileal conduit without complications  Polycystic kidneys unchanged from the prior study  No new renal findings  Persistent partially imaged left basilar effusion and resolved right basilar effusion  Previously described supraumbilical hernia no longer contains a segment of bowel                       Workstation performed: [id number]         XR chest 2 views   Final Result by Ran Reardon MD (08/31 1649)      Moderate cardiomegaly with mild interstitial pulmonary edema  Stable left pleural effusion with left lower lobe atelectasis  Superimposed pneumonia is not entirely excluded  Workstation performed: [id number]             EKG, Pathology, and Other Studies Reviewed on Admission:   · EKG:  Right bundle-branch block, left fascicular anterior block  Consider bifascicular block  Allscripts / Epic Records Reviewed: Yes     ** Please Note: This note has been constructed using a voice recognition system   **

## 2018-09-01 NOTE — CASE MANAGEMENT
Initial Clinical Review    Admission: Date/Time/Statement  08/31/18 1759    Orders Placed This Encounter   Procedures    Place in Observation (expected length of stay for this patient is less than two midnights)     Standing Status:   Standing     Number of Occurrences:   1     Order Specific Question:   Admitting Physician     Answer:   Samara Serna [[id number]]     Order Specific Question:   Level of Care     Answer:   Med Surg [[id number]]         ED: Date/Time/Mode of Arrival:   ED Arrival Information     Expected Arrival Acuity Means of Arrival Escorted By Service Admission Type    - 8/31/2018 14:35 Urgent Walk-In Self General Medicine Urgent    Arrival Complaint    blood in urine          Chief Complaint:   Chief Complaint   Patient presents with    Blood in Urine     pt with blood in urine, was just started on eliquis        History of Illness:     Wm Zuluaga is a 80 y o  male who presents with hematuria x2 days     Patient did so and thought that the symptoms were improving however on the day of admission he noted there was an increase in the amount and color of the hematuria and therefore he came to the hospital   Patient spoke with his cardiologist and was told to discontinue Eliquis which was recently restarted    past medical history significant for bladder cancer status post cystectomy, chemotherapy, radiation and has a current ileal conduit/urostomy, atrial fibrillation on Eliquis, coronary artery disease status post CABG, prior cerebellar stroke, congestive heart failure, stage 4 kidney disease, hypertension, hepatitis-C, COPD on home oxygen PRN,      ED Vital Signs:   ED Triage Vitals   Temperature Pulse Respirations Blood Pressure SpO2   08/31/18 1657 08/31/18 1441 08/31/18 1441 08/31/18 1441 08/31/18 1441   97 9 °F (36 6 °C) 79 18 170/77 99 %      No Pain       08/31/18 69 7 kg (153 lb 10 6 oz)       Abnormal Labs/Diagnostic Test Results:     Right    Hemoglobin 11 1 (L)    Hematocrit 35 5 (L) MCH 26 1 (L)    MCHC 31 3 (L)    RDW 15 8 (H)      INR 1 36 (H)     BUN 40 (H)   Creatinine 2 35 (H)     Potassium 3 3 (L)     Urostomy bag is filled with dark blood      · EKG:  Right bundle-branch block, left fascicular anterior block  Consider bifascicular block        CT ABDOMEN   Right lower quadrant ileal conduit without complications        Polycystic kidneys unchanged from the prior study  No new renal findings        Persistent partially imaged left basilar effusion and resolved right basilar effusion  ED Treatment:   Medication Administration from 08/31/2018 1435 to 08/31/2018 1840       Date/Time Order Dose Route     08/31/2018 1738 potassium chloride 10 % oral solution 40 mEq 40 mEq Oral     08/31/2018 1800 cefTRIAXone (ROCEPHIN) 1,000 mg in dextrose 5 % 50 mL IVPB 1,000 mg Intravenous             Past Medical History:   Diagnosis Date    Atrial fibrillation (HCC)     CAD (coronary artery disease)     [facility] )     Cerebellar stroke, acute ([facility] ) 7/16/2017    CHF (congestive heart failure) (HCC)     Chronic kidney disease     COPD (chronic obstructive pulmonary disease) (HCC)     Hepatitis C     Hypertension     TIA (transient ischemic attack)        Admitting Diagnosis: Blood in urine [R31 9]  Gross hematuria [R31 0]  Status post ileal conduit (HCC) [Z93 6]  Generalized weakness [R53 1]    Age/Sex: 80 y o  male    Assessment/Plan    Diagnosis management comments: 40-year-old male currently on Eliquis which has been on hold for 2 days, presents with 2 days of gross hematuria coming from ileal conduit/urostomy  Will do full septic workup and check cardiac labs given his other complaints of dyspnea and generalized weakness  This could be secondary to acute blood loss anemia    Will ultimately consult Urology and  admit for serial hemoglobin and urology consultation    Admission Orders:    Consult urology  REPEAT CBC AND DIFF  REPEAT BMP          Scheduled Meds:     acetaminophen 650 mg Oral Q6H PRN   carvedilol 6 25 mg Oral BID With Meals   cefTRIAXone 1,000 mg Intravenous Q24H   furosemide 40 mg Oral BID   ondansetron 4 mg Intravenous Q6H PRN   potassium chloride 20 mEq Oral BID

## 2018-09-01 NOTE — CONSULTS
Consultation - Cardiology Team One  Kecia Lopez 80 y o  male MRN: [id number]  Unit/Bed#: -01 Encounter: [id number]    Inpatient consult to Cardiology  Consult performed by: John Britt  Consult ordered by: Sierra Ramirez          Physician Requesting Consult: Kathie Patel DO  Reason for Consult / Principal Problem: Hematuria    HPI: Cardiologist Dr Huff Nathan is a 80y o  year old male who has a history of CAD, CKD 4, atrial fibrillation previously on Eliquis, chronic systolic Congestive heart failure, moderate to severe MR, hepatitis-C, history of CVA/TIA, history of bladder cancer s/p cystectomy presenting with hematuria  Patient was recently discharged on 08/26/2018 for acute on chronic systolic Congestive heart failure exacerbation with LVEF 40-45%  Upon discharge, patient noted to have hematuria while on Eliquis, was told to hold Eliquis per cardiologist   However, hematuria continued to persist for 2 more days  Hemoglobin 10 6  Denies chest pain, SOB, lightheadedness, palpitations, leg swelling, syncope        REVIEW OF SYSTEMS:  Constitutional:  Denies fever or chills   Eyes:  Denies change in visual acuity   HENT:  Denies nasal congestion or sore throat   Respiratory:  Denies cough or shortness of breath   Cardiovascular:  Denies chest pain or edema   GI:  Denies abdominal pain, nausea, vomiting, bloody stools or diarrhea   :  +hematuria  Musculoskeletal:  Denies back pain or joint pain   Neurologic:  Denies headache, focal weakness or sensory changes   Endocrine:  Denies polyuria or polydipsia   Lymphatic:  Denies swollen glands   Psychiatric:  Denies depression or anxiety     Historical Information   Past Medical History:   Diagnosis Date    [facility] )     CAD (coronary artery disease)     Cancer ([facility] )     bladder    [facility]erebellar stroke, acute ([facility] ) 7/16/2017    CHF (congestive heart failure) (HCC)     Chronic kidney disease     COPD (chronic obstructive pulmonary disease) (HCC)     Hepatitis C     Hypertension     TIA (transient ischemic attack)      Past Surgical History:   Procedure Laterality Date    CAROTID ARTERY ANGIOPLASTY      COLONOSCOPY N/A 3/24/2017    Procedure: COLONOSCOPY;  Surgeon: Noni Felix MD;  Location: [facility]; Service:     CORONARY ARTERY BYPASS GRAFT      CYSTECTOMY      ESOPHAGOGASTRODUODENOSCOPY N/A 3/23/2017    Procedure: ESOPHAGOGASTRODUODENOSCOPY (EGD); Surgeon: Noni Felix MD;  Location: [facility]; Service:     EYE SURGERY      ILEO CONDUIT       History   Alcohol Use No     History   Drug Use No     History   Smoking Status    Former Smoker    Quit date: 2/28/1973   Smokeless Tobacco    Former User       Family History:   Family History   Problem Relation Age of Onset    Coronary artery disease Mother        MEDS & ALLERGIES:  all current active meds have been reviewed and current meds: Current Facility-Administered Medications   Medication Dose Route Frequency    acetaminophen (TYLENOL) tablet 650 mg  650 mg Oral Q6H PRN    carvedilol (COREG) tablet 6 25 mg  6 25 mg Oral BID With Meals    cefTRIAXone (ROCEPHIN) 1,000 mg in dextrose 5 % 50 mL IVPB  1,000 mg Intravenous Q24H    furosemide (LASIX) tablet 40 mg  40 mg Oral BID    ondansetron (ZOFRAN) injection 4 mg  4 mg Intravenous Q6H PRN    potassium chloride (K-DUR,KLOR-CON) CR tablet 20 mEq  20 mEq Oral BID        No Known Allergies    OBJECTIVE:  Vitals:   Vitals:    09/01/18 0700   BP: 136/74   Pulse: 68   Resp: 18   Temp: 98 °F (36 7 °C)   SpO2: 97%     Body mass index is 22 69 kg/m²      Systolic (27LUS), LWO:963 , Min:114 , LCF:909     Diastolic (39HSD), PQF:86, Min:63, Max:93      Intake/Output Summary (Last 24 hours) at 09/01/18 1059  Last data filed at 09/01/18 0800   Gross per 24 hour   Intake              300 ml   Output             1800 ml   Net            -1500 ml     Weight (last 2 days)     Date/Time   Weight 08/31/18 2014  69 7 (153 66)    08/31/18 1841  69 7 (153 66)    08/31/18 1441  72 3 (159 39)            Invasive Devices     Peripheral Intravenous Line            Peripheral IV 08/31/18 Right Antecubital less than 1 day          Drain            Urostomy Ileal conduit RLQ -- days    Urostomy Ileal conduit less than 1 day                PHYSICAL EXAMS:  General:  Patient is not in acute distress, laying in the bed comfortably, awake, alert responding to commands  Head: Normocephalic, Atraumatic  HEENT: White sclera, pink conjunctiva,  PERRLA,pharynx benign  Neck:  Supple, no neck vein distention, carotids+2/+2 no bruits, thyromegaly, adenopathy  Respiratory: clear to P/A  Cardiovascular:  +murmur  PMI normal, S1-S2 normal, No  thrills, gallops, rubs   Regular rhythm  GI:  Abdomen soft nontender   No hepatosplenomegaly, adenopathy, ascites,or rebound tenderness  Extremities: No edema, normal pulses, no calf tenderness, no joint deformities, no venous disease   Integument:  No skin rashes or ulceration  Lymphatic:  No cervical or inguinal lymphadenopathy  Neurologic:  Patient is awake alert, responding to command, well-oriented to time and place and person moving all extremities    LABORATORY RESULTS:    Results from last 7 days  Lab Units 08/31/18  1620   TROPONIN I ng/mL 0 02     CBC with diff:   Results from last 7 days  Lab Units 09/01/18  0525 08/31/18  1620   WBC Thousand/uL 4 96 5 55   HEMOGLOBIN g/dL 10 6* 11 1*   HEMATOCRIT % 33 6* 35 5*   MCV fL 83 83   PLATELETS Thousands/uL 129* 153   MCH pg 26 0* 26 1*   MCHC g/dL 31 5 31 3*   RDW % 15 6* 15 8*   MPV fL 11 9 11 5   NRBC AUTO /100 WBCs 0 0       CMP:  Results from last 7 days  Lab Units 09/01/18  0525 08/31/18  1620   SODIUM mmol/L 137 139   POTASSIUM mmol/L 3 5 3 3*   CHLORIDE mmol/L 103 102   CO2 mmol/L 29 31   BUN mg/dL 35* 40*   CREATININE mg/dL 2 16* 2 35*   CALCIUM mg/dL 8 5 8 7   AST U/L  --  21   ALT U/L  --  19   ALK PHOS U/L  --  94   EGFR ml/min/1 73sq m 26 24       BMP:  Results from last 7 days  Lab Units 18  0525 18  1620   SODIUM mmol/L 137 139   POTASSIUM mmol/L 3 5 3 3*   CHLORIDE mmol/L 103 102   CO2 mmol/L 29 31   BUN mg/dL 35* 40*   CREATININE mg/dL 2 16* 2 35*   CALCIUM mg/dL 8 5 8 7         Results from last 7 days  Lab Units 18  1620   NT-PRO BNP pg/mL 11,821*        Results from last 7 days  Lab Units 18  1620   MAGNESIUM mg/dL 2 0               Results from last 7 days  Lab Units 18  1620   INR  1 36*       Lipid Profile:   No results found for: CHOL  Lab Results   Component Value Date    HDL 48 2017     Lab Results   Component Value Date    LDLCALC 79 2017     Lab Results   Component Value Date    TRIG 72 2017       Cardiac testing:   Results for orders placed during the hospital encounter of 18   Echo complete with contrast if indicated    Narrative [address] [place], [place] [address] [phone]    Transthoracic Echocardiogram  2D, M-mode, Doppler, and Color Doppler    Study date:  24-Aug-2018    Patient: Nan Coughlin  MR number: [id number]  Account number: [de-identified]  : 1930  Age: 80 years  Gender: Male  Status: Inpatient  Location: Bedside  Height: 69 in  Weight: 163 lb  BP: 120/ 85 mmHg    Indications: Heart Failure    Diagnoses: I50 9 - Heart failure, unspecified    Sonographer:  LEILA Chavez,RDCS  Interpreting Physician:  Gavino Jean MD  Referring Physician:  Thien Ng PA-C  Group:  [facility]    SUMMARY    LEFT VENTRICLE:  Ejection fraction was estimated to be 40 % to 45%  Inferior and lateral walls appear hypokinetic  RIGHT VENTRICLE:  Estimated peak pressure was at least 45 mmHg  LEFT ATRIUM:  The atrium was mildly to moderately dilated  RIGHT ATRIUM:  The atrium was dilated  MITRAL VALVE:  There was moderate to severe regurgitation   Similar to echocardiogram from Feb 2017  AORTIC VALVE:  There was trace regurgitation  TRICUSPID VALVE:  There was mild regurgitation  IVC, HEPATIC VEINS:  The inferior vena cava was dilated  PERICARDIUM:  There was a moderate to large -sized left pleural effusion  HISTORY: PRIOR HISTORY: Elevated Troponins, Hypertension, Coronary Artery Disease, Acute Kidney Injury, History of Congestive Heart Failure, History of Cerebral Vascular Accident, Shortness of Breath, Dizziness, Atrial Fibrillation    PROCEDURE: The procedure was performed at the bedside  This was a routine study  The transthoracic approach was used  The study included complete 2D imaging, M-mode, complete spectral Doppler, and color Doppler  The heart rate was 89 bpm,  at the start of the study  Images were obtained from the parasternal, apical, subcostal, and suprasternal notch acoustic windows  Echocardiographic views were limited due to poor acoustic window availability, decreased penetration, and  lung interference  This was a technically difficult study  LEFT VENTRICLE: Size was normal  Ejection fraction was estimated to be 40 % to 45%  Inferior and lateral walls appear hypokinetic  DOPPLER: There was an increased relative contribution of atrial contraction to ventricular filling  RIGHT VENTRICLE: The size was normal  Systolic function was normal  Wall thickness was normal  DOPPLER: Estimated peak pressure was at least 45 mmHg  LEFT ATRIUM: The atrium was mildly to moderately dilated  RIGHT ATRIUM: The atrium was dilated  MITRAL VALVE: There was annular calcification  DOPPLER: There was moderate to severe regurgitation  Similar to echocardiogram from Feb 2017  AORTIC VALVE: The valve was not well visualized  DOPPLER: There was trace regurgitation  TRICUSPID VALVE: The valve structure was normal  There was normal leaflet separation  DOPPLER: The transtricuspid velocity was within the normal range   There was no evidence for stenosis  There was mild regurgitation  PULMONIC VALVE: Leaflets exhibited normal thickness, no calcification, and normal cuspal separation  DOPPLER: The transpulmonic velocity was within the normal range  There was no regurgitation  PERICARDIUM: There was no pericardial effusion  There was a moderate to large -sized left pleural effusion  The pericardium was normal in appearance  AORTA: The root exhibited normal size  SYSTEMIC VEINS: IVC: The inferior vena cava was dilated  SYSTEM MEASUREMENT TABLES    2D  %FS: 8 2 %  Ao Diam: 3 5 cm  EDV(Teich): 107 4 ml  EF Biplane: 35 1 %  EF(Teich): 18 2 %  ESV(Teich): 87 9 ml  IVSd: 1 2 cm  LA Area: 30 9 cm2  LA Diam: 4 1 cm  LVEDV MOD A2C: 167 1 ml  LVEDV MOD A4C: 170 7 ml  LVEDV MOD BP: 169 2 ml  LVEF MOD A2C: 26 6 %  LVEF MOD A4C: 42 6 %  LVESV MOD A2C: 122 6 ml  LVESV MOD A4C: 98 1 ml  LVESV MOD BP: 109 7 ml  LVIDd: 4 8 cm  LVIDs: 4 4 cm  LVLd A2C: 9 7 cm  LVLd A4C: 9 8 cm  LVLs A2C: 9 3 cm  LVLs A4C: 9 4 cm  LVPWd: 1 cm  RA Area: 21 6 cm2  RVIDd: 3 3 cm  SV MOD A2C: 44 5 ml  SV MOD A4C: 72 6 ml  SV(Teich): 19 6 ml    CW  MR VTI: 161 2 cm  MR Vmax: 5 7 m/s  MR Vmean: 4 3 m/s  MR maxP 3 mmHg  MR meanP 1 mmHg  TR Vmax: 3 4 m/s  TR maxP 4 mmHg    MM  TAPSE: 1 6 cm    [employer] Accredited Echocardiography Laboratory    Prepared and electronically signed by    Aristides Wilkes MD  Signed 24-Aug-2018 18:53:36         Imaging:   I have personally reviewed pertinent reports  EKG reviewed personally:  A Fib, bifascicular block      Assessment/Plan:  1  Hematuria:    -Eliquis held, hematuria seems to be improving  Hemoglobin 10 6    -Eliquis is likely not the appropriate anticoagulation for this patient  Discussed with patient possibility of Coumadin  Will consider restarting anticoagulation once hematuria is resolved  -Urology evaluation pending, has history of bladder cancer s/p cystectomy      2   Chronic systolic CHF, ischemic cardiomyopathy:  -LVEF 40-45%  -euvolemic, continue p o  Lasix  -continue Coreg, no ACEI/ARB given elevated creatinine  -low-salt diet, daily weights, I/O    3  Paroxysmal atrial fibrillation:  Continue Coreg for rate control  Eliquis held given hematuria  Will consider Coumadin when hematuria is resolved  4   Mitral regurgitation:  Moderate to severe per last echocardiogram   Patient is not the best candidate for surgery, however may be a candidate for mitral clip  Continue Lasix  5   CAD S/P CABG x4:  No anginal symptoms  Continue Coreg  Not on aspirin as patient is on anticoagulation, intolerant to statins  Imdur and Norvasc recently stopped in last hospitalization given orthostatic dizziness  6   History of CVA/TIA:  Not on aspirin as patient is on home anticoagulation  Intolerant to statins  7   Hypertension:  Stable, continue present regimen  8   Hyperlipidemia:  Intolerant to statins  Code Status: Level 1 - Full Code    Counseling / Coordination of Care  Total floor / unit time spent today 35 minutes  Greater than 50% of total time was spent with the patient and / or family counseling and / or coordination of care  A description of the counseling / coordination of care: Review of history, current assessment, development of a plan      Livier Owens PA-C  9/1/2018,10:59 AM

## 2018-09-01 NOTE — ASSESSMENT & PLAN NOTE
Patient with known bladder cancer ileal conduit in place positive dark wine colored hematuria  Currently holding anticoagulation  Urology will see in the a m

## 2018-09-01 NOTE — CONSULTS
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 greater than 20 years ago-no evidence of recurrence to date  Polycystic kidney disease  Acute kidney injury/chronic kidney disease-improving    Plan:  Patient is scheduled for looposcopy in approximately 2 weeks with Dr Roberth Rodríguez  Consider outpatient loopogram prior to this visit as this may allow visualization of the ureters and renal pelvis  History of Present Illness      66-year-old male with a remote history of small cell bladder cancer status post chemotherapy and cystectomy with ileal loop urinary diversion admitted with gross hematuria  This has occurred in the past and has generally been associated with anticoagulation  The patient has been seen by Urology as an outpatient and is scheduled for looposcopy within the next several weeks  The patient reported to the hospital with a 2 day history of gross hematuria  He had again started anticoagulation with Eliquis  There is no fever or flank pain  He spoke to the cardiologist who recommend he discontinue Eliquis  The hematuria persisted prompting his presentation to the hospital   He was admitted for observation  Overnight his urine has cleared  He presently feels well    He is not eager to have further testing in the hospital         Attending: Gabe Munoz MD  Reason for Consult / Principal Problem:  Patient Active Problem List   Diagnosis    Elevated troponin    JUSTINE [facility]acute kidney injury) ([facility] [address] )    H/O carotid angioplasty    HTN (hypertension)    History of CHF (congestive heart failure)    Melena    SOB (shortness of breath)    Dizziness    Chronic systolic congestive heart failure (HCC)    Atrial fibrillation ([facility] [address] )  CKD (chronic kidney disease) stage 4, GFR 15-29 ml/min (HCC)    Anemia    Hypertensive CKD (chronic kidney disease)    Status post ileal conduit (HCC)    Generalized weakness    Urinary tract infection    History of cerebellar stroke    Hematuria    History of CVA (cerebrovascular accident)    CAD (coronary artery disease)    Malignant neoplasm of urinary bladder (HCC)    Acute systolic (congestive) heart failure (HCC)    Chronic kidney disease (CKD), stage IV (severe) ([facility] [address] )    Gross hematuria    Hypokalemia       Inpatient consult to Urology  Consult performed by: Vaibhav Mcclain ordered by: SONIDO Morrell          Review of Systems   Constitutional: Positive for activity change, appetite change and fatigue  Negative for chills, diaphoresis and fever  HENT: Negative  Eyes: Negative  Respiratory: Negative  Cardiovascular: Negative  Endocrine: Negative  Genitourinary: Negative for discharge, flank pain, penile pain, penile swelling, scrotal swelling and testicular pain  Musculoskeletal: Negative  Skin: Negative  Allergic/Immunologic: Negative  Neurological: Positive for weakness  Hematological: Negative  Psychiatric/Behavioral: Negative  Historical Information   No Known Allergies  Past Medical History:   Diagnosis Date    Atrial fibrillation (HCC)     CAD (coronary artery disease)     Cancer (HCC)     bladder    Cerebellar stroke, acute ([facility] [address] ) 7/16/2017    CHF (congestive heart failure) (HCC)     Chronic kidney disease     COPD (chronic obstructive pulmonary disease) (HCC)     Hepatitis C     Hypertension     TIA (transient ischemic attack)      Past Surgical History:   Procedure Laterality Date    CAROTID ARTERY ANGIOPLASTY      COLONOSCOPY N/A 3/24/2017    Procedure: COLONOSCOPY;  Surgeon: Carmen Rees MD;  Location: [facility];   Service:     CORONARY ARTERY BYPASS GRAFT      CYSTECTOMY      ESOPHAGOGASTRODUODENOSCOPY N/A 3/23/2017    Procedure: ESOPHAGOGASTRODUODENOSCOPY (EGD); Surgeon: Devonte Benton MD;  Location: [facility]; Service:    [facility] EYE SURGERY      ILEO CONDUIT       Social History   History   Alcohol Use No     History   Drug Use No     History   Smoking Status    Former Smoker    Quit date: 2/28/1973   Smokeless Tobacco    Former User     Family History: non-contributory    Meds/Allergies   all current active meds have been reviewed    Current Facility-Administered Medications:     acetaminophen (TYLENOL) tablet 650 mg, 650 mg, Oral, Q6H PRN, Jannie Stark MD    carvedilol (COREG) tablet 6 25 mg, 6 25 mg, Oral, BID With Meals, Jannie Stark MD, 6 25 mg at 09/01/18 0859    cefTRIAXone (ROCEPHIN) 1,000 mg in dextrose 5 % 50 mL IVPB, 1,000 mg, Intravenous, Q24H, Jannie Stark MD    furosemide (LASIX) tablet 40 mg, 40 mg, Oral, BID, Jannie Stark MD, 40 mg at 09/01/18 0859    ondansetron (ZOFRAN) injection 4 mg, 4 mg, Intravenous, Q6H PRN, Jannie Stark MD    potassium chloride (K-DUR,KLOR-CON) CR tablet 20 mEq, 20 mEq, Oral, BID, Jannie Stark MD, 20 mEq at 09/01/18 3502    Current Facility-Administered Medications:  acetaminophen 650 mg Oral Q6H PRN Jannie Stark MD   carvedilol 6 25 mg Oral BID With Meals Jannie Stark MD   cefTRIAXone 1,000 mg Intravenous Q24H Jannie Stark MD   furosemide 40 mg Oral BID Jannie Stark MD   ondansetron 4 mg Intravenous Q6H PRN Jannie Stark MD   potassium chloride 20 mEq Oral BID Jannie Stark MD       acetaminophen    ondansetron       Objective   Vitals: Blood pressure 136/74, pulse 68, temperature 98 °F (36 7 °C), temperature source Oral, resp  rate 18, height 5' 9" (1 753 m), weight 69 7 kg (153 lb 10 6 oz), SpO2 97 %  I/O last 24 hours: In: 300 [P O :240;  I V :10; IV Piggyback:50]  Out: 1920 [Urine:1920]    Invasive Devices     Peripheral Intravenous Line            Peripheral IV 08/31/18 Right Antecubital less than 1 day          Drain            Urostomy Ileal conduit RLQ -- days    Urostomy Ileal conduit less than 1 day                Physical Exam   Constitutional: He is oriented to person, place, and time  He appears well-developed and well-nourished  HENT:   Head: Normocephalic and atraumatic  Eyes: Conjunctivae are normal    Neck: Neck supple  Cardiovascular: Normal rate  Pulmonary/Chest: Effort normal    Abdominal: Soft  He exhibits no distension and no mass  There is no tenderness  There is no rebound and no guarding  Ileal loop with appliance in place, stoma is pink  Urine is now clear      No hernia   Genitourinary: Penis normal    Genitourinary Comments: Testes descended and normal to palpation   Musculoskeletal:   No CVA tenderness   Neurological: He is alert and oriented to person, place, and time  Skin: Skin is warm and dry  Psychiatric: He has a normal mood and affect  His behavior is normal  Judgment and thought content normal    Nursing note and vitals reviewed  Lab Results:   I have personally reviewed pertinent reports      CBC:   Lab Results   Component Value Date    WBC 4 96 09/01/2018    HGB 10 6 (L) 09/01/2018    HCT 33 6 (L) 09/01/2018    MCV 83 09/01/2018     (L) 09/01/2018    MCH 26 0 (L) 09/01/2018    MCHC 31 5 09/01/2018    RDW 15 6 (H) 09/01/2018    MPV 11 9 09/01/2018    NRBC 0 09/01/2018     CMP:   Lab Results   Component Value Date     09/01/2018     09/01/2018    CO2 29 09/01/2018    BUN 35 (H) 09/01/2018    CREATININE 2 16 (H) 09/01/2018    CALCIUM 8 5 09/01/2018    AST 21 08/31/2018    ALT 19 08/31/2018    ALKPHOS 94 08/31/2018    EGFR 26 09/01/2018     Urine Culture: No results found for: URINECX    Imaging Studies: I have personally reviewed pertinent films in PACS  Ct Abdomen Pelvis Wo Contrast    Result Date: 8/31/2018  Narrative: CT ABDOMEN AND PELVIS WITHOUT IV CONTRAST INDICATION:   gross hematuria from ileal conduit/urostomy   "gross hematuria, h/o bladder ca" COMPARISON:  CT chest on pelvis 8/24/2018  TECHNIQUE:  CT examination of the abdomen and pelvis was performed without intravenous contrast   Axial, sagittal, and coronal 2D reformatted images were created from the source data and submitted for interpretation  Radiation dose length product (DLP) for this visit:  576 mGy-cm   This examination, like all CT scans performed in the [facility], was performed utilizing techniques to minimize radiation dose exposure, including the use of iterative reconstruction and automated exposure control  Enteric contrast was administered  FINDINGS: ABDOMEN LOWER CHEST:  Persistent, partially imaged left pleural effusion  Right-sided effusion is largely resolved  LIVER/BILIARY TREE:  Hepatic cirrhosis  GALLBLADDER:  There are gallstone(s) within the gallbladder, without pericholecystic inflammatory changes  SPLEEN:  Scattered calcified granulomas  PANCREAS:  Unremarkable  ADRENAL GLANDS:  Unremarkable  KIDNEYS/URETERS:  Polycystic kidney disease; no change from May 2014  No hydronephrosis or perinephric collection  STOMACH AND BOWEL:  Colonic diverticulosis  No acute bowel findings  APPENDIX:  No findings to suggest appendicitis  ABDOMINOPELVIC CAVITY:  Right lower quadrant ileal conduit  No apparent complications  VESSELS:  Unremarkable for patient's age  PELVIS REPRODUCTIVE ORGANS:  Prostatectomy  URINARY BLADDER:  Status post cystectomy  ABDOMINAL WALL/INGUINAL REGIONS:  Previously seen supraumbilical hernia no longer contains bowel  Only a small amount of herniated fat is present within the hernia  OSSEOUS STRUCTURES:  No acute fracture or destructive osseous lesion  Impression: Right lower quadrant ileal conduit without complications  Polycystic kidneys unchanged from the prior study  No new renal findings  Persistent partially imaged left basilar effusion and resolved right basilar effusion   Previously described supraumbilical hernia no longer contains a segment of bowel  Workstation performed: [id number]     Ct Chest Abdomen Pelvis Wo Contrast    Result Date: 8/24/2018  Narrative: CT CHEST, ABDOMEN AND PELVIS WITHOUT IV CONTRAST INDICATION:   cough, SOB, also with abd pain  COMPARISON:  CT abdomen and pelvis 7/11/2018 TECHNIQUE: CT examination of the chest, abdomen and pelvis was performed without intravenous contrast   Axial, sagittal, and coronal 2D reformatted images were created from the source data and submitted for interpretation  Radiation dose length product (DLP) for this visit:  456 mGy-cm   This examination, like all CT scans performed in the [facility], was performed utilizing techniques to minimize radiation dose exposure, including the use of iterative reconstruction and automated exposure control  Enteric contrast was administered  FINDINGS: CHEST LUNGS:  Minimal lingular and left basilar chronic relaxation atelectasis  Subpleural and interstitial fibrosis and scattered areas of honeycombing somewhat more predominant in the upper lobes attributed to chronic interstitial lung disease  Mild emphysematous disease in the upper lobes  No acute infiltrate, pneumothorax, or suspicious pulmonary mass  Calcified granulomata are present in the right upper lobe and left lower lobe  PLEURA:  Moderate left and small right layering pleural effusion  This has enlarged on the right  HEART/GREAT VESSELS:  Sternal wire sutures and mediastinal clips are present compatible with prior coronary artery bypass graft surgery  Heart is enlarged  No pericardial effusion  Aortic and coronary artery calcification  MEDIASTINUM AND BRIAN:  Multiple shoddy mediastinal lymph nodes  1 cm short axis AP window and left paratracheal and 1 1 cm short axis subcarinal lymph nodes image 18, 20, and 27 series 2 respectively  Right hilar calcified granulomata  CHEST WALL AND LOWER NECK:   Unremarkable   ABDOMEN LIVER/BILIARY TREE:  Slightly nodular liver surface suggestive of hepatic cirrhosis  Punctate calcified granulomata in the right lobe  Otherwise unremarkable  GALLBLADDER:  There are gallstone(s) within the gallbladder, without pericholecystic inflammatory changes  SPLEEN:  Calcified granulomata are noted in the spleen  No suspicious splenic mass  PANCREAS:  Unremarkable  ADRENAL GLANDS:  Unremarkable  KIDNEYS/URETERS: One or more sharply circumscribed subcentimeter renal hypodensities are noted  These lesions are too small to accurately characterize, but are statistically most likely to represent benign cortical renal cyst(s)  According to the guidelines published in the [facility] Paper of the ACR Incidental Findings Committee (Radiology 2010), no further workup of these lesions is recommended  Additional bilateral renal simple cyst(s) is(are) present  Multiple cyst wall calcifications in both kidneys  No hydronephrosis or perinephric collection  STOMACH AND BOWEL:  Ascending through sigmoid colonic diverticulosis without immediate adjacent stranding  New left supraumbilical ventral abdominal wall hernia containing a loop of small bowel  No bowel obstruction  APPENDIX:  No findings to suggest appendicitis  ABDOMINOPELVIC CAVITY:  Trace presacral free fluid  No free gas or enlarged lymph nodes  VESSELS:  Aortoiliac and mesenteric vascular  No aneurysm  Medial displacement of the short segment of infrarenal aortic calcification attributed to sequela of old dissection  No change  PELVIS REPRODUCTIVE ORGANS:  Prostatectomy  Penile prosthesis reservoir left iliac fossa  URINARY BLADDER:  Cystectomy  ABDOMINAL WALL/INGUINAL REGIONS:  Right lower quadrant ileal conduit  As above  Tiny fat-containing bilateral inguinal hernias  OSSEOUS STRUCTURES:  No acute fracture or osseous destructive lesion identified  Degenerative changes of the spine, pubic symphysis, and multiple joints  Mild dextroconvex thoracic and levoconvex lumbar scoliosis    Stable grade 1 retrolisthesis T12 on L1 and L1 on L2 and grade 1 anterolisthesis L4 on L5  Impression: CT chest: Moderate left and small right layering pleural effusions  This has enlarged on the right  Chronic bibasilar and lingular left greater than right relaxation atelectasis  Chronic interstitial lung disease and mild upper lobe emphysema  Cardiomegaly  No pericardial effusion  Old calcified granulomatous disease  Borderline enlarged mediastinal lymph nodes likely related to chronic granulomatous disease  This could be followed up with noncontrast chest CT in 3 months  CT abdomen and pelvis: Trace nonspecific presacral free fluid  New small left supraumbilical hernia containing a loop of unobstructed small bowel  Otherwise, no acute intra-abdominal or intrapelvic process insofar as can be detected on a noncontrast CT or significant interval change  Cholelithiasis  Polycystic kidney disease  No perinephric hemorrhage  Colonic diverticulosis  The examination demonstrates a significant  finding and was documented as such in [facility] for liaison and referring practitioner notification  Workstation performed: [id number]     Xr Chest 2 Views    Result Date: 8/31/2018  Narrative: CHEST INDICATION:   chest pain, palpitations  COMPARISON:  July 11, 2018, CT chest dated August 24, 2018  EXAM PERFORMED/VIEWS:  XR CHEST PA & LATERAL FINDINGS:  Status post CABG  Stable moderate enlargement of the cardiac silhouette, with partial obscuration of the left heart border by the left lower lobe opacity  Mild pulmonary vascular congestion with perivascular indistinctness with prominent delmy B lines, in keeping with interstitial pulmonary edema  Small calcified granuloma in the left upper lobe, unchanged  There is grossly unchanged moderate left pleural effusion with left lower lobe atelectasis  Degenerative changes are noted in the thoracic spine  Impression: Moderate cardiomegaly with mild interstitial pulmonary edema   Stable left pleural effusion with left lower lobe atelectasis  Superimposed pneumonia is not entirely excluded  Workstation performed: [id number]     Ct Head Without Contrast    Result Date: 8/24/2018  Narrative: CT BRAIN - WITHOUT CONTRAST INDICATION:   dizziness, h/o prior stroke  Generalized weakness  Shortness of breath  COMPARISON:  CT brain July 13, 2017 TECHNIQUE:  CT examination of the brain was performed  In addition to axial images, coronal 2D reformatted images were created and submitted for interpretation  Radiation dose length product (DLP) for this visit:  949 mGy-cm   This examination, like all CT scans performed in the [facility], was performed utilizing techniques to minimize radiation dose exposure, including the use of iterative reconstruction and automated exposure control  IMAGE QUALITY:  Diagnostic  FINDINGS: PARENCHYMA:  No acute intraparenchymal hemorrhage or extra-axial fluid collections  No acute infarctions  Decreased attenuation in the periventricular regions and centrum semiovale bilaterally, consistent with proximal vessel ischemic change  Chronic lacunar infarctions are present in the basal ganglia bilaterally, central michelle and bilateral thalami  Bilateral internal carotid artery and vertebral artery calcifications  VENTRICLES AND EXTRA-AXIAL SPACES:  Enlargement of ventricles and extra-axial CSF spaces, out of proportion to the patient's age most consistent with cerebral and cerebellar atrophy  VISUALIZED ORBITS AND PARANASAL SINUSES:  No acute abnormality involving the orbits  Prior left-sided lens surgery  Mild scattered sinus mucosal thickening is noted  No fluid levels are seen  CALVARIUM AND EXTRACRANIAL SOFT TISSUES:  Normal      Impression: Stable cerebral atrophy with chronic small vessel ischemic white matter disease and chronic infarctions  No acute intracranial abnormality   Workstation performed: [id number]       EKG, Pathology, and Other Studies: I have personally reviewed pertinent reports        Code Status: Level 1 - Full Code     Rodrigue Nagy MD

## 2018-09-01 NOTE — ASSESSMENT & PLAN NOTE
Patient has history of hematuria in the past and GI bleed on Eliquis  Patient recently restarted Eliquis and has developed gross hematuria  He spoke with Cardiology who recommended stopping the medication however he continued to have hematuria and presented to the hospital for admission  Urology will see the patient in the a m

## 2018-09-01 NOTE — DISCHARGE SUMMARY
Discharge Summary - [facility] Internal Medicine    Patient Information: Jose Yan 80 y o  male MRN: [id number]  Unit/Bed#: -01 Encounter: [id number]    Discharging Physician / Practitioner: Franklin Lassiter MD  PCP: Sven Cuevas MD  Admission Date: 8/31/2018  Discharge Date: 09/01/18    Reason for Admission:  Hematuria    Discharge Diagnoses:     Principal Problem:  ·  Gross hematuria:  Patient with known history of ileal conduit  Recently restarted Eliquis and presented to the hospital with dark hematuria  At the time of discharge improved patient was seen by Urology and follow up as an outpatient  Eliquis discontinued  Patient and spouse aware of risk and benefit desire to discharged home for further follow-up as an outpatient  Active Problems:  ·  Status post ileal conduit [facility]):  Patient with history of bladder cancer, will follow up with Urology as an outpatient  ·  CAD (coronary artery disease):  Clinically stable at this time  Patient recently had admission for exacerbation of congestive heart failure  Patient is euvolemic at this time will continue with Coreg and Lasix  Will follow up as an outpatient  Of note patient has not been compliant with medications and make decisions is soft medications at home  I have encouraged him not to do this without communicating with his primary doctors  ·   Malignant neoplasm of urinary bladder ([facility] ): To follow up with Urology  ·   Chronic systolic congestive heart failure [facility]):  Patient will follow up with his cardiologist   He is encouraged to continue his Coreg and his Lasix despite his thought that perhaps he is feeling generally weak because of these medications  Patient admits that he was not taking his medications as prescribed  ·   Chronic kidney disease (CKD), stage IV (severe) ([facility] ):  Patient's baseline creatinine currently 2 35    Continued to be followed as an outpatient  ·   Atrial fibrillation [facility]):  Currently rate controlled on Coreg  Patient understands the risks and benefits of being off of Eliquis  Will follow up with Cardiology as an outpatient  Cardiology recommending warfarin therapy  But this needs to be initiated in conjunction with Urology  Currently will be held  ·   Hypokalemia:  Repleted  Resolved Problems:    * No resolved hospital problems  *      Consultations During Hospital Stay:  · Cardiology  · Urology    Procedures Performed:     · CT scan of the abdomen showed no acute fracture or destructive bone lesions  Right lower quadrant ileal conduit is present without complications  Patient has polycystic kidneys unchanged from previous study  No new renal findings documented  Persistent partially imaged left basilar effusion remains present and resolved right basilar effusion noted  Patient also has a supraumbilical hernia which does not contain any bowel at this time    Significant Findings / Test Results:     · As above  · Creatinine noted discharge 2 16  · Hemoglobin on discharge 10 6    Incidental Findings:   · None    Test Results Pending at Discharge (will require follow up): · None     Outpatient Tests Requested:  · None    Complications:  None    Hospital Course:     Raghav Kat is a 80 y o  male patient who originally presented to the hospital on 8/31/2018 due to gross hematuria  Patient had recently been started on Eliquis  He has had previous issues in the past with a GI bleed and was taken off  Patient subsequently was recently restarted  He states he noted a little pink-tinged that subsequently went away and then the next day noted is increasing and concentration  He had no pain  He denied nausea vomiting or shortness of breath  Patient was admitted overnight to monitor his renal function as well as his hemoglobins  Urology saw the patient in the a m  and determined that he would be followed up as an outpatient  Patient evidently had a study already planned    Patient desires to discharged home to have outpatient follow-up  His wife was included in the discharge planning  Cardiology saw the patient and deemed him in definitely not a candidate for Eliquis  They are recommending warfarin  They want him to be seen in follow-up by Urology and cleared before starting warfarin therapy  The patient will follow up with Cardiology for further blood thinning treatments once cleared by Urology  Condition at Discharge: fair     Discharge Day Visit / Exam:     Subjective:  Patient desires to discharged home for further evaluation is now patient  Describes no pain  Urine is less grossly hematuric today hemoglobin is stable  Vitals: Blood Pressure: 136/74 (09/01/18 0700)  Pulse: 68 (09/01/18 0700)  Temperature: 98 °F (36 7 °C) (09/01/18 0700)  Temp Source: Oral (09/01/18 0700)  Respirations: 18 (09/01/18 0700)  Height: 5' 9" (175 3 cm) (08/31/18 1841)  Weight - Scale: 69 7 kg (153 lb 10 6 oz) (08/31/18 2014)  SpO2: 97 % (09/01/18 0700)  Exam:   Physical Exam    Dishevelled elderly male in no acute distress normocephalic atraumatic pupils equal round and reactive to light extraocular muscles intact mucous membranes are moist neck is supple there is no JVD no lymph nodes no carotid bruits chest is decreased with poor air entry there is no rhonchi rales or wheezes  Cardiovascular regular rate rhythm positive S1 and S2 no S3-S4 murmur or gallop  Abdomen is soft nontender nondistended with positive bowel sounds no hepatosplenomegaly no guarding or rebound  Right lower quadrant contains ileostomy    Discharge instructions/Information to patient and family:   See after visit summary for information provided to patient and family  Provisions for Follow-Up Care:  See after visit summary for information related to follow-up care and any pertinent home health orders        Disposition:     Home    For Discharges to [facility] SNF:   · Not Applicable to this Patient - Not Applicable to this Patient    Planned Readmission:  None     Discharge Statement:  I spent 25 minutes discharging the patient  This time was spent on the day of discharge  I had direct contact with the patient on the day of discharge  Greater than 50% of the total time was spent examining patient, answering all patient questions, arranging and discussing plan of care with patient as well as directly providing post-discharge instructions  Additional time then spent on discharge activities  Discharge Medications:  See after visit summary for reconciled discharge medications provided to patient and family        ** Please Note: This note has been constructed using a voice recognition system **

## 2018-09-01 NOTE — PLAN OF CARE
DISCHARGE PLANNING     Discharge to home or other facility with appropriate resources Progressing        GENITOURINARY - ADULT     Maintains or returns to baseline urinary function Progressing        INFECTION - ADULT     Absence or prevention of progression during hospitalization Progressing        Knowledge Deficit     Patient/family/caregiver demonstrates understanding of disease process, treatment plan, medications, and discharge instructions Progressing        Nutrition/Hydration-ADULT     Nutrient/Hydration intake appropriate for improving, restoring or maintaining nutritional needs Progressing        PAIN - ADULT     Verbalizes/displays adequate comfort level or baseline comfort level Progressing        Potential for Falls     Patient will remain free of falls Progressing        SAFETY ADULT     Patient will remain free of falls Progressing     Maintain or return to baseline ADL function Progressing     Maintain or return mobility status to optimal level Progressing

## 2018-09-01 NOTE — ASSESSMENT & PLAN NOTE
Currently appears compensated  Patient states he did not take his Lasix this morning  As he has been feeling generally weak  Exam does not show volume overload at this time    Will consult Cardiology to help manage his heart failure

## 2018-09-01 NOTE — ASSESSMENT & PLAN NOTE
A patient recently was discharged from the hospital for exacerbation of CHF  Currently the seems to be compensated will continue to monitor  Will ask Cardiology to assist as patient feels that his medications are causing him generalized weakness  Will continue Coreg and Lasix    His

## 2018-09-04 LAB
ATRIAL RATE: 95 BPM
P AXIS: 241 DEGREES
PR INTERVAL: 306 MS
QRS AXIS: 112 DEGREES
QRSD INTERVAL: 160 MS
QT INTERVAL: 436 MS
QTC INTERVAL: 502 MS
T WAVE AXIS: -8 DEGREES
VENTRICULAR RATE: 80 BPM

## 2018-09-04 PROCEDURE — 93010 ELECTROCARDIOGRAM REPORT: CPT | Performed by: INTERNAL MEDICINE

## 2018-09-11 ENCOUNTER — TELEPHONE (OUTPATIENT)
Dept: CARDIOLOGY CLINIC | Facility: CLINIC | Age: 83
End: 2018-09-11

## 2018-09-11 NOTE — TELEPHONE ENCOUNTER
Delmer from [address] [place] [address] at Home called & said that pt has been experiencing intermiton sharp chest pains for the last week that he states is relieved when he takes his meds  She also said pt has been taking aspirin & they were not aware pt was to be taking this, she wants to make sure that this is ok for him to take? She said pt is pretty non compliant with his meds & "just does his own thing"   Emerson Carmichael can be reached at [phone] or [phone]

## 2018-09-11 NOTE — TELEPHONE ENCOUNTER
The patient should be on ASA 81 mg daily  He does have a history of Afib and vascular disease, so ASA would be beneficial   If the patient's chest discomfort seems to be related to pill ingestion, I suggest giving pills one a time, as the patient likely has GERD vs  Pill esophagitis  A PPI could also be added  If there is concern that this is ischemic in any well, the patient should be brought to the emergency room for evaluation

## 2018-09-13 ENCOUNTER — TELEPHONE (OUTPATIENT)
Dept: CARDIOLOGY CLINIC | Facility: CLINIC | Age: 83
End: 2018-09-13

## 2018-09-13 NOTE — TELEPHONE ENCOUNTER
If patient's blood pressure continues to be that low, he needs to be seen emergently  He may be dehydrated or septic or extremely anemic, given his propensity to bleed

## 2018-09-13 NOTE — TELEPHONE ENCOUNTER
Barbara bolivar Haines was calling to inform Dr Ferny Bello that patient was seen today & his blood pressure was really low   Call got disconnected I could not get bp readings

## 2018-09-13 NOTE — TELEPHONE ENCOUNTER
ERIKA CALLED BACK TO REPORT PT BP IS LOW  ERIKA SAW PT TODAY, RIGHT ARM BP WAS 76/58 AND LEFT ARM BP 95/63  PT DID NOT TAKE AMLODIPINE OR LASIX TODAY BUT PT DID TAKE COREG  PT WAS COMPLAINING OF LEFT RIB PAIN WITH DEEP BREATHS  ERIKA ADVISED PT TO GO TO THE ER BUT PT REFUSED  ERIKA WANTED DR CARRILLO TO HAVE THIS INFO FOR PT APPT TOMORROW  ANY QUESTIONS PLEASE CALL Melvi Klein [phone]   [place] [address]

## 2018-09-14 ENCOUNTER — OFFICE VISIT (OUTPATIENT)
Dept: CARDIOLOGY CLINIC | Facility: CLINIC | Age: 83
End: 2018-09-14
Payer: MEDICARE

## 2018-09-14 VITALS
OXYGEN SATURATION: 98 % | BODY MASS INDEX: 21.18 KG/M2 | HEART RATE: 70 BPM | DIASTOLIC BLOOD PRESSURE: 68 MMHG | HEIGHT: 69 IN | SYSTOLIC BLOOD PRESSURE: 120 MMHG | WEIGHT: 143 LBS

## 2018-09-14 DIAGNOSIS — I10 ESSENTIAL HYPERTENSION: Chronic | ICD-10-CM

## 2018-09-14 DIAGNOSIS — I48.0 PAROXYSMAL ATRIAL FIBRILLATION (HCC): ICD-10-CM

## 2018-09-14 DIAGNOSIS — R53.83 FATIGUE: Primary | ICD-10-CM

## 2018-09-14 DIAGNOSIS — I50.21 ACUTE SYSTOLIC (CONGESTIVE) HEART FAILURE (HCC): ICD-10-CM

## 2018-09-14 DIAGNOSIS — I50.22 CHRONIC SYSTOLIC CONGESTIVE HEART FAILURE (HCC): ICD-10-CM

## 2018-09-14 PROCEDURE — 99213 OFFICE O/P EST LOW 20 MIN: CPT | Performed by: INTERNAL MEDICINE

## 2018-09-14 RX ORDER — CARVEDILOL 6.25 MG/1
6.25 TABLET ORAL 2 TIMES DAILY WITH MEALS
Qty: 30 TABLET | Refills: 0 | Status: SHIPPED | OUTPATIENT
Start: 2018-09-14 | End: 2018-11-03 | Stop reason: SDUPTHER

## 2018-09-14 NOTE — TELEPHONE ENCOUNTER
lmom for John Xaviervilmalanre @ [phone]- Per Dr Coy Arreaga if BP continues to run low  he is to go immediately to the ED- as stated in previous message pt refuses to go to the ED   He has an appt today (9/14/18)@ 3:20pm with Dr Coy Arreaga

## 2018-09-14 NOTE — LETTER
September 14, 2018     MD Jannie Calderon 55 [address] [facility]    Patient: Morales Lay   YOB: 1930   Date of Visit: 9/14/2018       Dear Dr Rekha Brown: Thank you for referring Morales Lay to me for evaluation  Below are my notes for this consultation  If you have questions, please do not hesitate to call me  I look forward to following your patient along with you  Sincerely,        Queenie Pierce DO        CC: No Recipients  Queenie Pierce DO  9/14/2018 10:59 PM  Sign at close encounter                                             Cardiology Consultation     Morales Lay  [id number]  6/21/1930  [address] [facility]  [address] [facility]    Dear Dr Nica Jones is a 79 y/o gentleman who follows with the [facility] in [place] for the following issues:    1  Paroxysmal atrial fibrillaiton, not currently anticoagulated  2  HTN  3  CAD s/p CABG  4  Systolic Cardiomyopathy, EF 40-45%  5  HLD  6  Moderate to severe mitral regurgitation    Mr Nereida Ling was recently hospitalized with hematuria  He was having some hematuria at home and his NOAC was stopped  He continued to have some hematuria and was instructed to go to the hospital   Eventually the hematuria stopped  He was seen by cardiology and urology while in hospital   Urology has scheduled him for looposcopy for his ureterostomy this coming Monday  He was stable and discharged  He remained off of anticoagulation  He is currently taking approximately half of his lasix dose prescribed in the hospital (furosemide 40 mg daily instead of BID), but occasionally takes an extra dose when he feels as though his breathing is more labored  His wife accompanies him and complains that the patient is "self medicating" by altering his medical regimen        Patient Active Problem List   Diagnosis    Elevated troponin    JUSTINE (acute kidney injury) ([facility] )    H/O carotid angioplasty    HTN (hypertension)    History of CHF (congestive heart failure)    Melena    SOB (shortness of breath)    Dizziness    Chronic systolic congestive heart failure (HCC)    Atrial fibrillation (HCC)    CKD (chronic kidney disease) stage 4, GFR 15-29 ml/min (HCC)    Anemia    Hypertensive CKD (chronic kidney disease)    Status post ileal conduit (HCC)    Generalized weakness    Urinary tract infection    History of cerebellar stroke    Hematuria    History of CVA (cerebrovascular accident)    CAD (coronary artery disease)    Malignant neoplasm of urinary bladder (HCC)    Acute systolic (congestive) heart failure (HCC)    Chronic kidney disease (CKD), stage IV (severe) (HCC)    Gross hematuria    Hypokalemia     Past Medical History:   Diagnosis Date    Atrial fibrillation (HCC)     CAD (coronary artery disease)     Cancer (HCC)     bladder    [facility] 7[facility]16/2017    CHF (congestive heart failure) (HCC)     Chronic kidney disease     COPD (chronic obstructive pulmonary disease) (HCC)     Hepatitis C     Hypertension     TIA (transient ischemic attack)      Social History     Social History    Marital status: /Civil Union     Spouse name: N/A    Number of children: N/A    Years of education: N/A     Occupational History    retired      Social History Main Topics    Smoking status: Former Smoker     Quit date: 2/28/1973    Smokeless tobacco: Former User    Alcohol use No    Drug use: No    Sexual activity: No     Other Topics Concern    Not on file     Social History Narrative    No narrative on file      Family History   Problem Relation Age of Onset    Coronary artery disease Mother      Past Surgical History:   Procedure Laterality Date    CAROTID ARTERY ANGIOPLASTY      COLONOSCOPY N/A 3/24/2017    Procedure: COLONOSCOPY;  Surgeon: Robetr Salomon MD;  Location: [facility];  Service:     CORONARY ARTERY BYPASS GRAFT      CYSTECTOMY      ESOPHAGOGASTRODUODENOSCOPY N/A 3/23/2017    Procedure: ESOPHAGOGASTRODUODENOSCOPY (EGD); Surgeon: Kayode Solis MD;  Location: [facility]; Service:     EYE SURGERY      ILEO CONDUIT         Current Outpatient Prescriptions:     carvedilol (COREG) 6 25 mg tablet, Take 6 25 mg by mouth 2 (two) times a day with meals, Disp: , Rfl:     furosemide (LASIX) 40 mg tablet, Take 1 tablet (40 mg total) by mouth 2 (two) times a day, Disp: 30 tablet, Rfl: 0    potassium chloride (K-DUR,KLOR-CON) 20 mEq tablet, Take 1 tablet (20 mEq total) by mouth 2 (two) times a day, Disp: 30 tablet, Rfl: 0  No Known Allergies     Vitals:    09/14/18 1521   BP: 120/68   Pulse: 70   SpO2: 98%   Weight: 64 9 kg (143 lb)   Height: 5' 9" (1 753 m)       Labs:  Lab Results   Component Value Date     09/01/2018     08/31/2018     08/25/2018    K 3 5 09/01/2018    K 3 3 (L) 08/31/2018    K 3 6 08/25/2018    CO2 29 09/01/2018    CO2 31 08/31/2018    CO2 25 08/25/2018    BUN 35 (H) 09/01/2018    BUN 40 (H) 08/31/2018    BUN 35 (H) 08/25/2018    CREATININE 2 16 (H) 09/01/2018    CREATININE 2 35 (H) 08/31/2018    CREATININE 2 52 (H) 08/25/2018    CALCIUM 8 5 09/01/2018    CALCIUM 8 7 08/31/2018    CALCIUM 8 7 08/25/2018     Lab Results   Component Value Date    WBC 4 96 09/01/2018    WBC 5 55 08/31/2018    WBC 4 75 08/24/2018    HGB 10 6 (L) 09/01/2018    HGB 11 1 (L) 08/31/2018    HGB 11 1 (L) 08/24/2018    HCT 33 6 (L) 09/01/2018    HCT 35 5 (L) 08/31/2018    HCT 36 0 (L) 08/24/2018    MCV 83 09/01/2018    MCV 83 08/31/2018    MCV 85 08/24/2018     (L) 09/01/2018     08/31/2018     (L) 08/24/2018     Imaging:   CT Abdomen 8/31/2018  IMPRESSION:  Right lower quadrant ileal conduit without complications  Polycystic kidneys unchanged from the prior study  No new renal findings    Persistent partially imaged left basilar effusion and resolved right basilar effusion  Previously described supraumbilical hernia no longer contains a segment of bowel  TTE 8/24/2018  SUMMARY  LEFT VENTRICLE:  Ejection fraction was estimated to be 40 % to 45%  Inferior and lateral walls appear hypokinetic      RIGHT VENTRICLE:  Estimated peak pressure was at least 45 mmHg      LEFT ATRIUM:  The atrium was mildly to moderately dilated      RIGHT ATRIUM:  The atrium was dilated      MITRAL VALVE:  There was moderate to severe regurgitation  Similar to echocardiogram from Feb 2017      AORTIC VALVE:  There was trace regurgitation      TRICUSPID VALVE:  There was mild regurgitation      IVC, HEPATIC VEINS:  The inferior vena cava was dilated      PERICARDIUM:  There was a moderate to large -sized left pleural effusion  Review of Systems:  Review of Systems   Constitutional: Negative  HENT: Negative  Eyes: Negative  Respiratory: Negative  Cardiovascular: Negative  Gastrointestinal: Negative  Endocrine: Negative  Genitourinary: Negative  Musculoskeletal: Negative  Skin: Negative  Allergic/Immunologic: Negative  Neurological: Negative  Hematological: Negative  Psychiatric/Behavioral: Negative  Physical Exam:  Physical Exam   Constitutional: He is oriented to person, place, and time  He appears well-developed and well-nourished  HENT:   Head: Normocephalic and atraumatic  Eyes: EOM are normal    Neck: No JVD present  No tracheal deviation present  No thyromegaly present  Cardiovascular: S1 normal and S2 normal   An irregularly irregular rhythm present  PMI is not displaced  Exam reveals no gallop  Murmur heard  Systolic murmur is present with a grade of 3/6   Pulses:       Carotid pulses are 2+ on the right side, and 2+ on the left side  Radial pulses are 2+ on the right side, and 2+ on the left side  Pulmonary/Chest: Breath sounds normal  No accessory muscle usage  No tachypnea  No respiratory distress  Abdominal: Soft  Bowel sounds are normal  He exhibits no distension  There is no tenderness  Neurological: He is alert and oriented to person, place, and time  Skin: Skin is warm and dry  Psychiatric: He has a normal mood and affect  His behavior is normal  Judgment and thought content normal        Discussion/Summary:  1  Paroxysmal atrial fibrillaiton, not currently anticoagulated  2  HTN  3  CAD s/p CABG  4  Systolic Cardiomyopathy, EF 40-45%  5  HLD  6  Moderate to severe mitral regurgitation    I had a long a magda discussion about the benefits of anticoagulation  His CHADS2-Vasc score is 7, which translates to an 11 2% risk of a CVA/Embolic event  His daily risk is 0 03%  He will remain off of anticoagulation through his looposcopy on Wednesday  If there is a lower risk of bleeding, we will reconsider anticoagulation, but the issue will be with what agent  He would prefer to stay off of warfarin to avoid blood draws, and his tendency to ad shen with this medications make this less attractive  We may consider edoxaban, as the other NOACs will likely pose a problem with his renal function  We will address this after Monday  His blood pressure and CHF are all well controlled and compensated  He continues on his carvedilol for rate control  He will follow up in this office in the next few weeks  Thank you for the opportunity to consult on this patient  If you have any questions, please feel free to call my office

## 2018-09-15 NOTE — PROGRESS NOTES
Cardiology Consultation     Charles Smith  [id number]  6/21/1930  [facility]    Dear Dr Marii Hughes is a 81 y/o gentleman who follows with the [facility] Department of Cardiology in [place] for the following issues:    1  Paroxysmal atrial fibrillaiton, not currently anticoagulated  2  HTN  3  CAD s/p CABG  4  Systolic Cardiomyopathy, EF 40-45%  5  HLD  6  Moderate to severe mitral regurgitation    Mr Verónica Llanes was recently hospitalized with hematuria  He was having some hematuria at home and his NOAC was stopped  He continued to have some hematuria and was instructed to go to the hospital   Eventually the hematuria stopped  He was seen by cardiology and urology while in hospital   Urology has scheduled him for looposcopy for his ureterostomy this coming Monday  He was stable and discharged  He remained off of anticoagulation  He is currently taking approximately half of his lasix dose prescribed in the hospital (furosemide 40 mg daily instead of BID), but occasionally takes an extra dose when he feels as though his breathing is more labored  His wife accompanies him and complains that the patient is "self medicating" by altering his medical regimen        Patient Active Problem List   Diagnosis    Elevated troponin    JUSTINE [facility] kidney [facility]) [facility]    H/O carotid angioplasty    HTN (hypertension)    History of CHF (congestive heart failure)    Melena    SOB (shortness of breath)    Dizziness    Chronic systolic congestive heart failure (HCC)    Atrial fibrillation (HCC)    CKD (chronic kidney disease) stage 4, GFR 15-29 ml/min (HCC)    Anemia    Hypertensive CKD (chronic kidney disease)    Status post ileal conduit (HCC)    Generalized weakness    Urinary tract infection    History of cerebellar stroke    Hematuria    History of CVA (cerebrovascular accident)    CAD (coronary artery disease)    Malignant neoplasm of urinary bladder (HCC)    Acute systolic (congestive) heart failure (HCC)    Chronic kidney disease (CKD), stage IV (severe) [facility] )    Gross hematuria    Hypokalemia     Past Medical History:   Diagnosis Date    [facility]     CAD (coronary artery disease)     Cancer (HCC)     bladder    Cerebellar stroke, acute ([facility] ) 7/16/2017    CHF (congestive heart failure) (HCC)     Chronic kidney disease     COPD (chronic obstructive pulmonary disease) (HCC)     Hepatitis C     Hypertension     TIA (transient ischemic attack)      Social History     Social History    Marital status: /Civil Union     Spouse name: N/A    Number of children: N/A    Years of education: N/A     Occupational History    retired      Social History Main Topics    Smoking status: Former Smoker     Quit date: 2/28/1973    Smokeless tobacco: Former User    Alcohol use No    Drug use: No    Sexual activity: No     Other Topics Concern    Not on file     Social History Narrative    No narrative on file      Family History   Problem Relation Age of Onset    Coronary artery disease Mother      Past Surgical History:   Procedure Laterality Date    CAROTID ARTERY ANGIOPLASTY      COLONOSCOPY N/A 3/24/2017    Procedure: COLONOSCOPY;  Surgeon: Beth Del Valle MD;  Location: [facility]; Service:     CORONARY ARTERY BYPASS GRAFT      CYSTECTOMY      ESOPHAGOGASTRODUODENOSCOPY N/A 3/23/2017    Procedure: ESOPHAGOGASTRODUODENOSCOPY (EGD); Surgeon: Beth Del Valle MD;  Location: [facility];   Service:     EYE SURGERY      ILEO CONDUIT         Current Outpatient Prescriptions:     carvedilol (COREG) 6 25 mg tablet, Take 6 25 mg by mouth 2 (two) times a day with meals, Disp: , Rfl:     furosemide (LASIX) 40 mg tablet, Take 1 tablet (40 mg total) by mouth 2 (two) times a day, Disp: 30 tablet, Rfl: 0    potassium chloride (K-DUR,KLOR-CON) 20 mEq tablet, Take 1 tablet (20 mEq total) by mouth 2 (two) times a day, Disp: 30 tablet, Rfl: 0  No Known Allergies     Vitals:    09/14/18 1521   BP: 120/68   Pulse: 70   SpO2: 98%   Weight: 64 9 kg (143 lb)   Height: 5' 9" (1 753 m)       Labs:  Lab Results   Component Value Date     09/01/2018     08/31/2018     08/25/2018    K 3 5 09/01/2018    K 3 3 (L) 08/31/2018    K 3 6 08/25/2018    CO2 29 09/01/2018    CO2 31 08/31/2018    CO2 25 08/25/2018    BUN 35 (H) 09/01/2018    BUN 40 (H) 08/31/2018    BUN 35 (H) 08/25/2018    CREATININE 2 16 (H) 09/01/2018    CREATININE 2 35 (H) 08/31/2018    CREATININE 2 52 (H) 08/25/2018    CALCIUM 8 5 09/01/2018    CALCIUM 8 7 08/31/2018    CALCIUM 8 7 08/25/2018     Lab Results   Component Value Date    WBC 4 96 09/01/2018    WBC 5 55 08/31/2018    WBC 4 75 08/24/2018    HGB 10 6 (L) 09/01/2018    HGB 11 1 (L) 08/31/2018    HGB 11 1 (L) 08/24/2018    HCT 33 6 (L) 09/01/2018    HCT 35 5 (L) 08/31/2018    HCT 36 0 (L) 08/24/2018    MCV 83 09/01/2018    MCV 83 08/31/2018    MCV 85 08/24/2018     (L) 09/01/2018     08/31/2018     (L) 08/24/2018     Imaging:   CT Abdomen 8/31/2018  IMPRESSION:  Right lower quadrant ileal conduit without complications  Polycystic kidneys unchanged from the prior study  No new renal findings  Persistent partially imaged left basilar effusion and resolved right basilar effusion  Previously described supraumbilical hernia no longer contains a segment of bowel  TTE 8/24/2018  SUMMARY  LEFT VENTRICLE:  Ejection fraction was estimated to be 40 % to 45%  Inferior and lateral walls appear hypokinetic      RIGHT VENTRICLE:  Estimated peak pressure was at least 45 mmHg      LEFT ATRIUM:  The atrium was mildly to moderately dilated      RIGHT ATRIUM:  The atrium was dilated      MITRAL VALVE:  There was moderate to severe regurgitation   Similar to echocardiogram from Feb 2017      AORTIC VALVE:  There was trace regurgitation      TRICUSPID VALVE:  There was mild regurgitation      IVC, HEPATIC VEINS:  The inferior vena cava was dilated      PERICARDIUM:  There was a moderate to large -sized left pleural effusion  Review of Systems:  Review of Systems   Constitutional: Negative  HENT: Negative  Eyes: Negative  Respiratory: Negative  Cardiovascular: Negative  Gastrointestinal: Negative  Endocrine: Negative  Genitourinary: Negative  Musculoskeletal: Negative  Skin: Negative  Allergic/Immunologic: Negative  Neurological: Negative  Hematological: Negative  Psychiatric/Behavioral: Negative  Physical Exam:  Physical Exam   Constitutional: He is oriented to person, place, and time  He appears well-developed and well-nourished  HENT:   Head: Normocephalic and atraumatic  Eyes: EOM are normal    Neck: No JVD present  No tracheal deviation present  No thyromegaly present  Cardiovascular: S1 normal and S2 normal   An irregularly irregular rhythm present  PMI is not displaced  Exam reveals no gallop  Murmur heard  Systolic murmur is present with a grade of 3/6   Pulses:       Carotid pulses are 2+ on the right side, and 2+ on the left side  Radial pulses are 2+ on the right side, and 2+ on the left side  Pulmonary/Chest: Breath sounds normal  No accessory muscle usage  No tachypnea  No respiratory distress  Abdominal: Soft  Bowel sounds are normal  He exhibits no distension  There is no tenderness  Neurological: He is alert and oriented to person, place, and time  Skin: Skin is warm and dry  Psychiatric: He has a normal mood and affect  His behavior is normal  Judgment and thought content normal        Discussion/Summary:  1  Paroxysmal atrial fibrillaiton, not currently anticoagulated  2  HTN  3  CAD s/p CABG  4  Systolic Cardiomyopathy, EF 40-45%  5  HLD  6   Moderate to severe mitral regurgitation    I had a long a magda discussion about the benefits of anticoagulation  His CHADS2-Vasc score is 7, which translates to an 11 2% risk of a CVA/Embolic event  His daily risk is 0 03%  He will remain off of anticoagulation through his looposcopy on Wednesday  If there is a lower risk of bleeding, we will reconsider anticoagulation, but the issue will be with what agent  He would prefer to stay off of warfarin to avoid blood draws, and his tendency to ad shen with this medications make this less attractive  We may consider edoxaban, as the other NOACs will likely pose a problem with his renal function  We will address this after Monday  His blood pressure and CHF are all well controlled and compensated  He continues on his carvedilol for rate control  He will follow up in this office in the next few weeks  Thank you for the opportunity to consult on this patient  If you have any questions, please feel free to call my office

## 2018-09-17 ENCOUNTER — PROCEDURE VISIT (OUTPATIENT)
Dept: UROLOGY | Facility: CLINIC | Age: 83
End: 2018-09-17
Payer: MEDICARE

## 2018-09-17 ENCOUNTER — TELEPHONE (OUTPATIENT)
Dept: CARDIOLOGY CLINIC | Facility: CLINIC | Age: 83
End: 2018-09-17

## 2018-09-17 VITALS
SYSTOLIC BLOOD PRESSURE: 130 MMHG | BODY MASS INDEX: 21.03 KG/M2 | DIASTOLIC BLOOD PRESSURE: 70 MMHG | HEART RATE: 72 BPM | HEIGHT: 69 IN | WEIGHT: 142 LBS

## 2018-09-17 DIAGNOSIS — C67.9 MALIGNANT NEOPLASM OF URINARY BLADDER, UNSPECIFIED SITE (HCC): Primary | ICD-10-CM

## 2018-09-17 DIAGNOSIS — Z93.6 STATUS POST ILEAL CONDUIT (HCC): ICD-10-CM

## 2018-09-17 DIAGNOSIS — R31.0 GROSS HEMATURIA: ICD-10-CM

## 2018-09-17 PROCEDURE — 52000 CYSTOURETHROSCOPY: CPT | Performed by: UROLOGY

## 2018-09-17 RX ORDER — ALPRAZOLAM 0.5 MG/1
0.5 TABLET ORAL DAILY PRN
Refills: 0 | COMMUNITY
Start: 2018-08-27

## 2018-09-17 RX ORDER — ALPRAZOLAM 0.5 MG/1
TABLET ORAL
COMMUNITY
Start: 2018-08-27 | End: 2018-11-06 | Stop reason: SDUPTHER

## 2018-09-17 NOTE — PROGRESS NOTES
Office Cystoscopy Procedure Note    Indication:    Hematuria and Urothelial carcinoma of the bladder    Informed consent   The risks, benefits, complications, treatment options, and expected outcomes were discussed with the patient  The patient concurred with the proposed plan and provided informed consent  Anesthesia  Lidocaine jelly 2%    Antibiotic prophylaxis   None    Procedure  The patient was placed in the supineposition, was prepped and draped in the usual manner using sterile technique, and 2% lidocaine jelly instilled into the urethra  A 17 F flexible cystoscope was used  Findings:  Stoma is pink and protuberant and draining clear urine  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  A rather small aperture lumen is noted  Unable to pass the scope or a 14F red rubber    Other: IPP is normal to palpation    Specimens: None                 Complications:    None; patient tolerated the procedure well           Disposition: To home after 30 minute observation  Condition: Stable    Plan: I have recommended continued conservative management  The stomal stenosis is non obstructive  If he has further hematuria or other symptoms, can perform dilation with completion of loop endoscopy and loopogram under anesthesia    Will followup in 3 months for symptoms reassessment or sooner if needed

## 2018-09-18 NOTE — TELEPHONE ENCOUNTER
Discussed with Dr Eddie Ponce  No obvious lesion on urologic endoscopy for intervention  Discussed anti-coagulation strategy  Will offer edoxaban vs warfarin vs conservative management and acceptance of elevated thromboembolic risk      [address]

## 2018-11-03 DIAGNOSIS — I50.22 CHRONIC SYSTOLIC CONGESTIVE HEART FAILURE (HCC): ICD-10-CM

## 2018-11-05 RX ORDER — CARVEDILOL 6.25 MG/1
TABLET ORAL
Qty: 30 TABLET | Refills: 0 | Status: SHIPPED | OUTPATIENT
Start: 2018-11-05 | End: 2018-11-06 | Stop reason: SDUPTHER

## 2018-11-06 ENCOUNTER — OFFICE VISIT (OUTPATIENT)
Dept: NEPHROLOGY | Facility: CLINIC | Age: 83
End: 2018-11-06
Payer: MEDICARE

## 2018-11-06 VITALS — BODY MASS INDEX: 21.92 KG/M2 | WEIGHT: 148 LBS | HEIGHT: 69 IN | TEMPERATURE: 97.5 F

## 2018-11-06 DIAGNOSIS — I25.10 CORONARY ARTERY DISEASE INVOLVING NATIVE CORONARY ARTERY OF NATIVE HEART WITHOUT ANGINA PECTORIS: Chronic | ICD-10-CM

## 2018-11-06 DIAGNOSIS — N18.4 CKD (CHRONIC KIDNEY DISEASE) STAGE 4, GFR 15-29 ML/MIN (HCC): Primary | ICD-10-CM

## 2018-11-06 DIAGNOSIS — I50.22 CHRONIC SYSTOLIC CONGESTIVE HEART FAILURE (HCC): ICD-10-CM

## 2018-11-06 DIAGNOSIS — N18.4 CHRONIC KIDNEY DISEASE (CKD), STAGE IV (SEVERE) (HCC): ICD-10-CM

## 2018-11-06 PROCEDURE — 99213 OFFICE O/P EST LOW 20 MIN: CPT | Performed by: INTERNAL MEDICINE

## 2018-11-06 RX ORDER — ASPIRIN 81 MG/1
81 TABLET ORAL DAILY
COMMUNITY

## 2018-11-06 NOTE — PROGRESS NOTES
NEPHROLOGY OFFICE FOLLOW UP  Kp Hall 80 y o  male MRN: [id number]    Encounter: [id number] 11/6/2018    REASON FOR VISIT: Etta Koch is a 80 y o  male who is here on 11/6/2018 for Follow-up and CKD IV    HPI:    Emily Cháevz came in today for follow-up of stage IV CKD  He came in because he was told that kidney function is deteriorating  Recently he was seen by cardiologist who is changing his medication  He is overall doing well  Complaining some dizziness once in a while which is trying to blame it on diuretic  No shortness of breath no chest pain no palpitation        REVIEW OF SYSTEMS:    Review of Systems   Constitutional: Negative for activity change and fatigue  HENT: Negative for congestion and ear discharge  Eyes: Negative for photophobia and pain  Respiratory: Negative for apnea and choking  Cardiovascular: Negative for chest pain and palpitations  Gastrointestinal: Negative for abdominal distention and blood in stool  Endocrine: Negative for heat intolerance and polyphagia  Genitourinary: Negative for flank pain and urgency  Musculoskeletal: Negative for neck pain and neck stiffness  Skin: Negative for color change and wound  Allergic/Immunologic: Negative for food allergies and immunocompromised state  Neurological: Negative for seizures and facial asymmetry  Hematological: Negative for adenopathy  Does not bruise/bleed easily  Psychiatric/Behavioral: Negative for self-injury and suicidal ideas           PAST MEDICAL HISTORY:  Past Medical History:   Diagnosis Date    [facility]     CAD (coronary artery disease)     Cancer ([facility] )     bladder    C[facility]bellar stroke, acute ([facility] ) 7/16/2017    CHF (congestive heart failure) (HCC)     Chronic kidney disease     COPD (chronic obstructive pulmonary disease) (HCC)     Hepatitis C     Hypertension     TIA (transient ischemic attack)        PAST SURGICAL HISTORY:  Past Surgical History:   Procedure Laterality Date    CAROTID ARTERY ANGIOPLASTY      COLONOSCOPY N/A 3/24/2017    Procedure: COLONOSCOPY;  Surgeon: Catherine Nelson MD;  Location: [facility]; Service:     CORONARY ARTERY BYPASS GRAFT      CYSTECTOMY      ESOPHAGOGASTRODUODENOSCOPY N/A 3/23/2017    Procedure: ESOPHAGOGASTRODUODENOSCOPY (EGD); Surgeon: Catherine Nelson MD;  Location: [facility]; Service:     EYE SURGERY      ILEO CONDUIT         SOCIAL HISTORY:  History   Alcohol Use No     History   Drug Use No     History   Smoking Status    Former Smoker    Quit date: 2/28/1973   Smokeless Tobacco    Former User       FAMILY HISTORY:  Family History   Problem Relation Age of Onset    Coronary artery disease Mother        MEDICATIONS:    Current Outpatient Prescriptions:     aspirin (ECOTRIN LOW STRENGTH) 81 mg EC tablet, Take 81 mg by mouth daily, Disp: , Rfl:     carvedilol (COREG) 6 25 mg tablet, Take 6 25 mg by mouth 2 (two) times a day with meals, Disp: , Rfl:     furosemide (LASIX) 40 mg tablet, Take 1 tablet (40 mg total) by mouth 2 (two) times a day (Patient taking differently: Take 40 mg by mouth daily  ), Disp: 30 tablet, Rfl: 0    potassium chloride (K-DUR,KLOR-CON) 20 mEq tablet, Take 1 tablet (20 mEq total) by mouth 2 (two) times a day, Disp: 30 tablet, Rfl: 0    ALPRAZolam (XANAX) 0 5 mg tablet, Take 0 5 mg by mouth daily as needed, Disp: , Rfl: 0    PHYSICAL EXAM:  Vitals:    11/06/18 1623   Temp: 97 5 °F (36 4 °C)   TempSrc: Oral   Weight: 67 1 kg (148 lb)   Height: 5' 9" (1 753 m)     Body mass index is 21 86 kg/m²  Physical Exam   Constitutional: He is oriented to person, place, and time  He appears well-developed  No distress  HENT:   Head: Normocephalic  Mouth/Throat: Oropharynx is clear and moist    Eyes: Conjunctivae are normal  No scleral icterus  Neck: Neck supple  No JVD present  Cardiovascular: Normal rate and regular rhythm  Murmur heard    Pulmonary/Chest: Effort normal and breath sounds normal  No respiratory distress  He has no wheezes  He has no rales  Abdominal: Soft  Bowel sounds are normal  He exhibits no distension and no mass  There is no tenderness  Musculoskeletal: Normal range of motion  He exhibits no edema  Neurological: He is alert and oriented to person, place, and time  Skin: Skin is warm  No rash noted  Psychiatric: He has a normal mood and affect   His behavior is normal        LAB RESULTS:  Results for orders placed or performed during the hospital encounter of 08/31/18   Comprehensive metabolic panel   Result Value Ref Range    Sodium 139 136 - 145 mmol/L    Potassium 3 3 (L) 3 5 - 5 3 mmol/L    Chloride 102 100 - 108 mmol/L    CO2 31 21 - 32 mmol/L    ANION GAP 6 4 - 13 mmol/L    BUN 40 (H) 5 - 25 mg/dL    Creatinine 2 35 (H) 0 60 - 1 30 mg/dL    Glucose 122 65 - 140 mg/dL    Calcium 8 7 8 3 - 10 1 mg/dL    AST 21 5 - 45 U/L    ALT 19 12 - 78 U/L    Alkaline Phosphatase 94 46 - 116 U/L    Total Protein 8 4 (H) 6 4 - 8 2 g/dL    Albumin 2 9 (L) 3 5 - 5 0 g/dL    Total Bilirubin 0 60 0 20 - 1 00 mg/dL    eGFR 24 ml/min/1 73sq m   CBC and differential   Result Value Ref Range    WBC 5 55 4 31 - 10 16 Thousand/uL    RBC 4 26 3 88 - 5 62 Million/uL    Hemoglobin 11 1 (L) 12 0 - 17 0 g/dL    Hematocrit 35 5 (L) 36 5 - 49 3 %    MCV 83 82 - 98 fL    MCH 26 1 (L) 26 8 - 34 3 pg    MCHC 31 3 (L) 31 4 - 37 4 g/dL    RDW 15 8 (H) 11 6 - 15 1 %    MPV 11 5 8 9 - 12 7 fL    Platelets 440 149 - 560 Thousands/uL    nRBC 0 /100 WBCs    Neutrophils Relative 63 43 - 75 %    Immat GRANS % 0 0 - 2 %    Lymphocytes Relative 21 14 - 44 %    Monocytes Relative 12 4 - 12 %    Eosinophils Relative 3 0 - 6 %    Basophils Relative 1 0 - 1 %    Neutrophils Absolute 3 50 1 85 - 7 62 Thousands/µL    Immature Grans Absolute 0 01 0 00 - 0 20 Thousand/uL    Lymphocytes Absolute 1 17 0 60 - 4 47 Thousands/µL    Monocytes Absolute 0 68 0 17 - 1 22 Thousand/µL    Eosinophils Absolute 0 15 0 00 - 0 61 Thousand/µL    Basophils Absolute 0 04 0 00 - 0 10 Thousands/µL   Protime-INR   Result Value Ref Range    Protime 16 7 (H) 11 8 - 14 2 seconds    INR 1 36 (H) 0 86 - 1 17   APTT   Result Value Ref Range    PTT 37 (H) 24 - 36 seconds   Magnesium   Result Value Ref Range    Magnesium 2 0 1 6 - 2 6 mg/dL   Lipase   Result Value Ref Range    Lipase 226 73 - 393 u/L   Troponin I   Result Value Ref Range    Troponin I 0 02 <=0 04 ng/mL   B-type natriuretic peptide   Result Value Ref Range    NT-proBNP 11,821 (H) <450 pg/mL   Lactic acid, plasma   Result Value Ref Range    LACTIC ACID 1 1 0 5 - 2 0 mmol/L   Basic metabolic panel   Result Value Ref Range    Sodium 137 136 - 145 mmol/L    Potassium 3 5 3 5 - 5 3 mmol/L    Chloride 103 100 - 108 mmol/L    CO2 29 21 - 32 mmol/L    ANION GAP 5 4 - 13 mmol/L    BUN 35 (H) 5 - 25 mg/dL    Creatinine 2 16 (H) 0 60 - 1 30 mg/dL    Glucose 89 65 - 140 mg/dL    Glucose, Fasting 89 65 - 99 mg/dL    Calcium 8 5 8 3 - 10 1 mg/dL    eGFR 26 ml/min/1 73sq m   CBC and differential   Result Value Ref Range    WBC 4 96 4 31 - 10 16 Thousand/uL    RBC 4 07 3 88 - 5 62 Million/uL    Hemoglobin 10 6 (L) 12 0 - 17 0 g/dL    Hematocrit 33 6 (L) 36 5 - 49 3 %    MCV 83 82 - 98 fL    MCH 26 0 (L) 26 8 - 34 3 pg    MCHC 31 5 31 4 - 37 4 g/dL    RDW 15 6 (H) 11 6 - 15 1 %    MPV 11 9 8 9 - 12 7 fL    Platelets 797 (L) 358 - 390 Thousands/uL    nRBC 0 /100 WBCs    Neutrophils Relative 53 43 - 75 %    Immat GRANS % 0 0 - 2 %    Lymphocytes Relative 29 14 - 44 %    Monocytes Relative 14 (H) 4 - 12 %    Eosinophils Relative 3 0 - 6 %    Basophils Relative 1 0 - 1 %    Neutrophils Absolute 2 64 1 85 - 7 62 Thousands/µL    Immature Grans Absolute 0 02 0 00 - 0 20 Thousand/uL    Lymphocytes Absolute 1 44 0 60 - 4 47 Thousands/µL    Monocytes Absolute 0 67 0 17 - 1 22 Thousand/µL    Eosinophils Absolute 0 15 0 00 - 0 61 Thousand/µL    Basophils Absolute 0 04 0 00 - 0 10 Thousands/µL   ECG 12 lead   Result Value Ref Range    Ventricular Rate 80 BPM    Atrial Rate 95 BPM    RI Interval 306 ms    QRSD Interval 160 ms    QT Interval 436 ms    QTC Interval 502 ms    P Axis 241 degrees    QRS Axis 112 degrees    T Wave Axis -8 degrees   Type and screen   Result Value Ref Range    ABO Grouping O     Rh Factor Negative     Antibody Screen Positive     Specimen Expiration Date [id number]    Antibody identification   Result Value Ref Range    ANTIBODY ID  #1 Anti-K        ASSESSMENT and PLAN:      CKD (chronic kidney disease) stage 4, GFR 15-29 ml/min ([facility])  Kidney function is stable based on last blood test with GFR of about 24  Dialysis has been discussed with him in the past he is refusing and does not want to go on dialysis  He claims he rather die than go on dialysis  I agree with the decision will try to keep him comfortable    CAD (coronary artery disease)  Stable and being monitored by cardiologist    Chronic systolic congestive heart failure ([facility] [address] )  Asymptomatic  I will reduce diuretic to once a day and will monitor him        I will see him back in 3 months  If he started gaining weight or started getting short of breath he will call me and I will increase diuretic again  Discussed with him and his wife      Portions of the record may have been created with voice recognition software  Occasional wrong word or "sound a like" substitutions may have occurred due to the inherent limitations of voice recognition software  Read the chart carefully and recognize, using context, where substitutions have occurred  If you have any questions, please contact the dictating provider

## 2018-11-06 NOTE — LETTER
November 6, 2018     MD [address] [id number] [facility]    Patient: Annette Barber   YOB: 1930   Date of Visit: 11/6/2018       Dear Dr Marialuisa Chavez: Thank you for referring Annette Barber to me for evaluation  Below are my notes for this consultation  If you have questions, please do not hesitate to call me  I look forward to following your patient along with you  Sincerely,        Donna Frias MD        CC: No Recipients  Donna Frias MD  11/6/2018  5:24 PM  Sign at close encounter  Lakeshia Gill [birth year] 80 y o  male MRN: [id number]    Encounter: [id number] 11/6/2018    REASON FOR VISIT: Annette Barber is a 80 y o  male who is here on 11/6/2018 for Follow-up and CKD IV    HPI:    Zaida Emerson came in today for follow-up of stage IV CKD  He came in because he was told that kidney function is deteriorating  Recently he was seen by cardiologist who is changing his medication  He is overall doing well  Complaining some dizziness once in a while which is trying to blame it on diuretic  No shortness of breath no chest pain no palpitation        REVIEW OF SYSTEMS:    Review of Systems   Constitutional: Negative for activity change and fatigue  HENT: Negative for congestion and ear discharge  Eyes: Negative for photophobia and pain  Respiratory: Negative for apnea and choking  Cardiovascular: Negative for chest pain and palpitations  Gastrointestinal: Negative for abdominal distention and blood in stool  Endocrine: Negative for heat intolerance and polyphagia  Genitourinary: Negative for flank pain and urgency  Musculoskeletal: Negative for neck pain and neck stiffness  Skin: Negative for color change and wound  Allergic/Immunologic: Negative for food allergies and immunocompromised state  Neurological: Negative for seizures and facial asymmetry  Hematological: Negative for adenopathy  Does not bruise/bleed easily  Psychiatric/Behavioral: Negative for self-injury and suicidal ideas  PAST MEDICAL HISTORY:  Past Medical History:   Diagnosis Date    [facility]     CAD (coronary artery disease)     Cancer ([facility] )     bladder    Cerebellar stroke, acute ([facility] ) 7/16/2017    CHF (congestive heart failure) (HCC)     Chronic kidney disease     COPD (chronic obstructive pulmonary disease) (HCC)     Hepatitis C     Hypertension     TIA (transient ischemic attack)        PAST SURGICAL HISTORY:  Past Surgical History:   Procedure Laterality Date    CAROTID ARTERY ANGIOPLASTY      COLONOSCOPY N/A 3/24/2017    Procedure: COLONOSCOPY;  Surgeon: Jacquelin Norman MD;  Location: [facility]; Service:     CORONARY ARTERY BYPASS GRAFT      CYSTECTOMY      ESOPHAGOGASTRODUODENOSCOPY N/A 3/23/2017    Procedure: ESOPHAGOGASTRODUODENOSCOPY (EGD); Surgeon: Jacquelin Norman MD;  Location: [facility];   Service:     EYE SURGERY      ILEO CONDUIT         SOCIAL HISTORY:  History   Alcohol Use No     History   Drug Use No     History   Smoking Status    Former Smoker    Quit date: 2/28/1973   Smokeless Tobacco    Former User       FAMILY HISTORY:  Family History   Problem Relation Age of Onset    Coronary artery disease Mother        MEDICATIONS:    Current Outpatient Prescriptions:     aspirin (ECOTRIN LOW STRENGTH) 81 mg EC tablet, Take 81 mg by mouth daily, Disp: , Rfl:     carvedilol (COREG) 6 25 mg tablet, Take 6 25 mg by mouth 2 (two) times a day with meals, Disp: , Rfl:     furosemide (LASIX) 40 mg tablet, Take 1 tablet (40 mg total) by mouth 2 (two) times a day (Patient taking differently: Take 40 mg by mouth daily  ), Disp: 30 tablet, Rfl: 0    potassium chloride (K-DUR,KLOR-CON) 20 mEq tablet, Take 1 tablet (20 mEq total) by mouth 2 (two) times a day, Disp: 30 tablet, Rfl: 0    ALPRAZolam (XANAX) 0 5 mg tablet, Take 0 5 mg by mouth daily as needed, Disp: , Rfl: 0    PHYSICAL EXAM:  Vitals: 11/06/18 1623   Temp: 97 5 °F (36 4 °C)   TempSrc: Oral   Weight: 67 1 kg (148 lb)   Height: 5' 9" (1 753 m)     Body mass index is 21 86 kg/m²  Physical Exam   Constitutional: He is oriented to person, place, and time  He appears well-developed  No distress  HENT:   Head: Normocephalic  Mouth/Throat: Oropharynx is clear and moist    Eyes: Conjunctivae are normal  No scleral icterus  Neck: Neck supple  No JVD present  Cardiovascular: Normal rate and regular rhythm  Murmur heard  Pulmonary/Chest: Effort normal and breath sounds normal  No respiratory distress  He has no wheezes  He has no rales  Abdominal: Soft  Bowel sounds are normal  He exhibits no distension and no mass  There is no tenderness  Musculoskeletal: Normal range of motion  He exhibits no edema  Neurological: He is alert and oriented to person, place, and time  Skin: Skin is warm  No rash noted  Psychiatric: He has a normal mood and affect   His behavior is normal        LAB RESULTS:  Results for orders placed or performed during the hospital encounter of 08/31/18   Comprehensive metabolic panel   Result Value Ref Range    Sodium 139 136 - 145 mmol/L    Potassium 3 3 (L) 3 5 - 5 3 mmol/L    Chloride 102 100 - 108 mmol/L    CO2 31 21 - 32 mmol/L    ANION GAP 6 4 - 13 mmol/L    BUN 40 (H) 5 - 25 mg/dL    Creatinine 2 35 (H) 0 60 - 1 30 mg/dL    Glucose 122 65 - 140 mg/dL    Calcium 8 7 8 3 - 10 1 mg/dL    AST 21 5 - 45 U/L    ALT 19 12 - 78 U/L    Alkaline Phosphatase 94 46 - 116 U/L    Total Protein 8 4 (H) 6 4 - 8 2 g/dL    Albumin 2 9 (L) 3 5 - 5 0 g/dL    Total Bilirubin 0 60 0 20 - 1 00 mg/dL    eGFR 24 ml/min/1 73sq m   CBC and differential   Result Value Ref Range    WBC 5 55 4 31 - 10 16 Thousand/uL    RBC 4 26 3 88 - 5 62 Million/uL    Hemoglobin 11 1 (L) 12 0 - 17 0 g/dL    Hematocrit 35 5 (L) 36 5 - 49 3 %    MCV 83 82 - 98 fL    MCH 26 1 (L) 26 8 - 34 3 pg    MCHC 31 3 (L) 31 4 - 37 4 g/dL    RDW 15 8 (H) 11 6 - 15 1 % MPV 11 5 8 9 - 12 7 fL    Platelets 386 687 - 983 Thousands/uL    nRBC 0 /100 WBCs    Neutrophils Relative 63 43 - 75 %    Immat GRANS % 0 0 - 2 %    Lymphocytes Relative 21 14 - 44 %    Monocytes Relative 12 4 - 12 %    Eosinophils Relative 3 0 - 6 %    Basophils Relative 1 0 - 1 %    Neutrophils Absolute 3 50 1 85 - 7 62 Thousands/µL    Immature Grans Absolute 0 01 0 00 - 0 20 Thousand/uL    Lymphocytes Absolute 1 17 0 60 - 4 47 Thousands/µL    Monocytes Absolute 0 68 0 17 - 1 22 Thousand/µL    Eosinophils Absolute 0 15 0 00 - 0 61 Thousand/µL    Basophils Absolute 0 04 0 00 - 0 10 Thousands/µL   Protime-INR   Result Value Ref Range    Protime 16 7 (H) 11 8 - 14 2 seconds    INR 1 36 (H) 0 86 - 1 17   APTT   Result Value Ref Range    PTT 37 (H) 24 - 36 seconds   Magnesium   Result Value Ref Range    Magnesium 2 0 1 6 - 2 6 mg/dL   Lipase   Result Value Ref Range    Lipase 226 73 - 393 u/L   Troponin I   Result Value Ref Range    Troponin I 0 02 <=0 04 ng/mL   B-type natriuretic peptide   Result Value Ref Range    NT-proBNP 11,821 (H) <450 pg/mL   Lactic acid, plasma   Result Value Ref Range    LACTIC ACID 1 1 0 5 - 2 0 mmol/L   Basic metabolic panel   Result Value Ref Range    Sodium 137 136 - 145 mmol/L    Potassium 3 5 3 5 - 5 3 mmol/L    Chloride 103 100 - 108 mmol/L    CO2 29 21 - 32 mmol/L    ANION GAP 5 4 - 13 mmol/L    BUN 35 (H) 5 - 25 mg/dL    Creatinine 2 16 (H) 0 60 - 1 30 mg/dL    Glucose 89 65 - 140 mg/dL    Glucose, Fasting 89 65 - 99 mg/dL    Calcium 8 5 8 3 - 10 1 mg/dL    eGFR 26 ml/min/1 73sq m   CBC and differential   Result Value Ref Range    WBC 4 96 4 31 - 10 16 Thousand/uL    RBC 4 07 3 88 - 5 62 Million/uL    Hemoglobin 10 6 (L) 12 0 - 17 0 g/dL    Hematocrit 33 6 (L) 36 5 - 49 3 %    MCV 83 82 - 98 fL    MCH 26 0 (L) 26 8 - 34 3 pg    MCHC 31 5 31 4 - 37 4 g/dL    RDW 15 6 (H) 11 6 - 15 1 %    MPV 11 9 8 9 - 12 7 fL    Platelets 749 (L) 493 - 390 Thousands/uL    nRBC 0 /100 WBCs Neutrophils Relative 53 43 - 75 %    Immat GRANS % 0 0 - 2 %    Lymphocytes Relative 29 14 - 44 %    Monocytes Relative 14 (H) 4 - 12 %    Eosinophils Relative 3 0 - 6 %    Basophils Relative 1 0 - 1 %    Neutrophils Absolute 2 64 1 85 - 7 62 Thousands/µL    Immature Grans Absolute 0 02 0 00 - 0 20 Thousand/uL    Lymphocytes Absolute 1 44 0 60 - 4 47 Thousands/µL    Monocytes Absolute 0 67 0 17 - 1 22 Thousand/µL    Eosinophils Absolute 0 15 0 00 - 0 61 Thousand/µL    Basophils Absolute 0 04 0 00 - 0 10 Thousands/µL   ECG 12 lead   Result Value Ref Range    Ventricular Rate 80 BPM    Atrial Rate 95 BPM    IN Interval 306 ms    QRSD Interval 160 ms    QT Interval 436 ms    QTC Interval 502 ms    P Axis 241 degrees    QRS Axis 112 degrees    T Wave Axis -8 degrees   Type and screen   Result Value Ref Range    ABO Grouping O     Rh Factor Negative     Antibody Screen Positive     Specimen Expiration Date [id number]    Antibody identification   Result Value Ref Range    ANTIBODY ID  #1 Anti-K        ASSESSMENT and PLAN:      CKD (chronic kidney disease) stage 4, GFR 15-29 ml/min ([facility])  Kidney function is stable based on last blood test with GFR of about 24  Dialysis has been discussed with him in the past he is refusing and does not want to go on dialysis  He claims he rather die than go on dialysis  I agree with the decision will try to keep him comfortable    CAD (coronary artery disease)  Stable and being monitored by cardiologist    Chronic systolic congestive heart failure ([facility] )  Asymptomatic  I will reduce diuretic to once a day and will monitor him        I will see him back in 3 months  If he started gaining weight or started getting short of breath he will call me and I will increase diuretic again  Discussed with him and his wife      Portions of the record may have been created with voice recognition software   Occasional wrong word or "sound a like" substitutions may have occurred due to the inherent limitations of voice recognition software  Read the chart carefully and recognize, using context, where substitutions have occurred  If you have any questions, please contact the dictating provider

## 2018-11-06 NOTE — PATIENT INSTRUCTIONS
Chronic Kidney Disease   AMBULATORY CARE:   Chronic kidney disease (CKD)  is the gradual and permanent loss of kidney function  Normally, the kidneys remove fluid, chemicals, and waste from your blood  These wastes are turned into urine by your kidneys  CKD may worsen over time and lead to kidney failure  Common symptoms include the following:   · Changes in how often you need to urinate    · Swelling in your arms, legs, or feet    · Shortness of breath    · Fatigue or weakness    · Bad or bitter taste in your mouth    · Nausea, vomiting, or loss of appetite  Seek care immediately if:   · You are confused and very drowsy  · You have a seizure  · You have shortness of breath  Contact your healthcare provider if:   · You suddenly gain or lose more weight than your healthcare provider has told you is okay  · You have itchy skin or a rash  · You urinate more or less than you normally do  · You have blood in your urine  · You have nausea and repeated vomiting  · You have fatigue or muscle weakness  · You have hiccups that will not stop  · You have questions or concerns about your condition or care  Treatment for CKD:  Medicines may be given to decrease blood pressure and get rid of extra fluid  You may also receive medicine to manage health conditions that may occur with CKD  Dialysis is a treatment to remove chemicals and waste from your blood when your kidneys can no longer do this  Surgery may be needed to create an arteriovenous fistula (AVF) in your arm or insert a catheter into your abdomen so that you can receive dialysis  A kidney transplant may be done if your CKD becomes severe  Manage CKD:   · Maintain a healthy weight  Ask your healthcare provider how much you should weigh  Ask him to help you create a weight loss plan if you are overweight  · Exercise 30 to 60 minutes a day, 4 to 7 times a week, or as directed  Ask about the best exercise plan for you   Regular exercise can help you manage CKD, high blood pressure, and diabetes  · Follow your healthcare provider's advice about what to eat and drink  He may tell you to eat food low in sodium (salt), potassium, phosphorus, or protein  You may need to see a dietitian if you need help planning meals  Ask how much liquid to drink each day and which liquids are best for you  · Limit alcohol  Ask how much alcohol is safe for you to drink  A drink of alcohol is 12 ounces of beer, 5 ounces of wine, or 1½ ounces of liquor  · Do not smoke  Nicotine and other chemicals in cigarettes and cigars can cause lung and kidney damage  Ask your healthcare provider for information if you currently smoke and need help to quit  E-cigarettes or smokeless tobacco still contain nicotine  Talk to your healthcare provider before you use these products  · Ask your healthcare provider if you need vaccines  Infections such as pneumonia, influenza, and hepatitis can be more harmful or more likely to occur in a person who has CKD  Vaccines reduce your risk of infection with these viruses  Follow up with your healthcare provider as directed:  Write down your questions so you remember to ask them during your visits  © 2017 [employer] Information is for End User's use only and may not be sold, redistributed or otherwise used for commercial purposes  All illustrations and images included in CareNotes® are the copyrighted property of [employer]  or Pankaj Shepp[employer]  The above information is an  only  It is not intended as medical advice for individual conditions or treatments  Talk to your doctor, nurse or pharmacist before following any medical regimen to see if it is safe and effective for you

## 2018-11-29 ENCOUNTER — TELEPHONE (OUTPATIENT)
Dept: UROLOGY | Facility: AMBULATORY SURGERY CENTER | Age: 83
End: 2018-11-29

## 2018-11-29 NOTE — TELEPHONE ENCOUNTER
Patient can be scheduled next available with AP or Dr Pacheco  Patient to take antibiotic as prescribed by PCP

## 2018-11-29 NOTE — TELEPHONE ENCOUNTER
Patient was referred to Dr Callie Mosqueda by family doctor to be seen as soon as possible for urinary tract infection    Does not want to wait until 1/15/19 for his 3 months follow up

## 2018-12-14 ENCOUNTER — HOSPITAL ENCOUNTER (EMERGENCY)
Facility: HOSPITAL | Age: 83
Discharge: HOME | End: 2018-12-14
Attending: EMERGENCY MEDICINE | Admitting: EMERGENCY MEDICINE
Payer: MEDICARE

## 2018-12-14 ENCOUNTER — TELEPHONE (OUTPATIENT)
Dept: UROLOGY | Facility: CLINIC | Age: 83
End: 2018-12-14

## 2018-12-14 VITALS
TEMPERATURE: 97.8 F | HEART RATE: 68 BPM | RESPIRATION RATE: 16 BRPM | OXYGEN SATURATION: 95 % | SYSTOLIC BLOOD PRESSURE: 140 MMHG | DIASTOLIC BLOOD PRESSURE: 64 MMHG

## 2018-12-14 DIAGNOSIS — N39.0 UTI (URINARY TRACT INFECTION): Primary | ICD-10-CM

## 2018-12-14 LAB
BACTERIA UR QL AUTO: ABNORMAL /HPF
BILIRUB UR QL STRIP: NEGATIVE
CLARITY UR: ABNORMAL
COLOR UR: YELLOW
GLUCOSE UR STRIP-MCNC: NEGATIVE MG/DL
HGB UR QL STRIP.AUTO: ABNORMAL
KETONES UR STRIP-MCNC: NEGATIVE MG/DL
LEUKOCYTE ESTERASE UR QL STRIP: ABNORMAL
NITRITE UR QL STRIP: POSITIVE
NON-SQ EPI CELLS URNS QL MICRO: ABNORMAL /HPF
PH UR STRIP.AUTO: 7 [PH] (ref 4.5–8)
PROT UR STRIP-MCNC: ABNORMAL MG/DL
RBC #/AREA URNS AUTO: ABNORMAL /HPF
SP GR UR STRIP.AUTO: 1.01 (ref 1–1.03)
UROBILINOGEN UR QL STRIP.AUTO: 0.2 E.U./DL
WBC #/AREA URNS AUTO: ABNORMAL /HPF

## 2018-12-14 PROCEDURE — 81001 URINALYSIS AUTO W/SCOPE: CPT | Performed by: EMERGENCY MEDICINE

## 2018-12-14 PROCEDURE — 99284 EMERGENCY DEPT VISIT MOD MDM: CPT

## 2018-12-14 RX ORDER — CEPHALEXIN 250 MG/1
500 CAPSULE ORAL 4 TIMES DAILY
Qty: 80 CAPSULE | Refills: 0 | Status: SHIPPED | OUTPATIENT
Start: 2018-12-14 | End: 2018-12-24

## 2018-12-14 NOTE — TELEPHONE ENCOUNTER
PT stopped into Urology office stating that he felt weak and has reoccurring UTIs and wanted to know if he could be seen  I advised him we had no provider in office today and that he should go to the emergency room or call his PCP to be seen and that he does have an apt w us on 1/15/19

## 2018-12-14 NOTE — ED PROVIDER NOTES
History  Chief Complaint   Patient presents with    Weakness - Generalized     patient is c/o generalized weakness  concerned about a UTI  HPI  49-year-old male past medical history of bladder cancer, ileal conduit and recurrent UTIs presents with generalized weakness  He states that he feels this way when he has a urine infection  He tried to go to his Urology office today but they told him there is no provider in office and he should go to the ED  Patient declines any tests other than a urinalysis  He has been on multiple different antibiotics in the past   Denies any fever, chest pain, shortness of breath, abdominal pain  Prior to Admission Medications   Prescriptions Last Dose Informant Patient Reported? Taking?    ALPRAZolam (XANAX) 0 5 mg tablet  Self Yes No   Sig: Take 0 5 mg by mouth daily as needed   aspirin (ECOTRIN LOW STRENGTH) 81 mg EC tablet   Yes No   Sig: Take 81 mg by mouth daily   carvedilol (COREG) 6 25 mg tablet  Self Yes No   Sig: Take 6 25 mg by mouth 2 (two) times a day with meals   furosemide (LASIX) 40 mg tablet  Self No No   Sig: Take 1 tablet (40 mg total) by mouth 2 (two) times a day   Patient taking differently: Take 40 mg by mouth daily     potassium chloride (K-DUR,KLOR-CON) 20 mEq tablet  Self No No   Sig: Take 1 tablet (20 mEq total) by mouth 2 (two) times a day      Facility-Administered Medications: None       Past Medical History:   Diagnosis Date    Atrial fibrillation (HCC)     CAD (coronary artery disease)     Cancer (HCC)     bladder    Cerebellar stroke, acute ([facility] ) 7/16/2017    CHF (congestive heart failure) (HCC)     Chronic kidney disease     COPD (chronic obstructive pulmonary disease) (HCC)     Hepatitis C     Hypertension     TIA (transient ischemic attack)        Past Surgical History:   Procedure Laterality Date    CAROTID ARTERY ANGIOPLASTY      COLONOSCOPY N/A 3/24/2017    Procedure: COLONOSCOPY;  Surgeon: Julio Montague MD;  [place] [facility]  Service:     CORONARY ARTERY BYPASS GRAFT      CYSTECTOMY      ESOPHAGOGASTRODUODENOSCOPY N/A 3/23/2017    Procedure: ESOPHAGOGASTRODUODENOSCOPY (EGD); Surgeon: Catherine Nelson MD;  Location: [facility]; Service:     EYE SURGERY      ILEO CONDUIT         Family History   Problem Relation Age of Onset    Coronary artery disease Mother      I have reviewed and agree with the history as documented  Social History   Substance Use Topics    Smoking status: Former Smoker     Quit date: 2/28/1973    Smokeless tobacco: Former User    Alcohol use No        Review of Systems   Constitutional: Negative for chills and fever  HENT: Negative for dental problem and ear pain  Eyes: Negative for pain and redness  Respiratory: Negative for cough and shortness of breath  Cardiovascular: Negative for chest pain and palpitations  Gastrointestinal: Negative for abdominal pain and nausea  Endocrine: Negative for polydipsia and polyphagia  Genitourinary: Negative for dysuria and frequency  Musculoskeletal: Negative for arthralgias and joint swelling  Skin: Negative for color change and rash  Neurological: Positive for weakness  Negative for dizziness and headaches  Psychiatric/Behavioral: Negative for behavioral problems and confusion  All other systems reviewed and are negative  Physical Exam  Physical Exam   Constitutional: He is oriented to person, place, and time  He appears well-developed and well-nourished  No distress  HENT:   Head: Atraumatic  Right Ear: External ear normal    Left Ear: External ear normal    Nose: Nose normal    Eyes: Pupils are equal, round, and reactive to light  Conjunctivae and EOM are normal    Neck: Normal range of motion  Neck supple  No JVD present  Cardiovascular: Normal heart sounds  No murmur heard  Irregularly irregular   Pulmonary/Chest: Effort normal and breath sounds normal  No respiratory distress  He has no wheezes  Abdominal: Soft  Bowel sounds are normal  He exhibits no distension  There is no tenderness  Urostomy draining clear urine, skin nonerythematous   Musculoskeletal: Normal range of motion  He exhibits no edema  Neurological: He is alert and oriented to person, place, and time  No cranial nerve deficit  Skin: Skin is warm and dry  Capillary refill takes less than 2 seconds  He is not diaphoretic  Psychiatric: He has a normal mood and affect  His behavior is normal    Nursing note and vitals reviewed        Vital Signs  ED Triage Vitals [12/14/18 1457]   Temperature Pulse Respirations Blood Pressure SpO2   97 8 °F (36 6 °C) 68 16 140/64 95 %      Temp Source Heart Rate Source Patient Position - Orthostatic VS BP Location FiO2 (%)   Oral Monitor Sitting Left arm --      Pain Score       No Pain           Vitals:    12/14/18 1457   BP: 140/64   Pulse: 68   Patient Position - Orthostatic VS: Sitting       Visual Acuity      ED Medications  Medications - No data to display    Diagnostic Studies  Results Reviewed     Procedure Component Value Units Date/Time    Urine Microscopic [[id number]]  (Abnormal) Collected:  12/14/18 1538    Lab Status:  Final result Specimen:  Urine from Urine, Suprapubic catheter Updated:  12/14/18 1554     RBC, UA 0-1 (A) /hpf      WBC, UA Innumerable (A) /hpf      Epithelial Cells Occasional /hpf      Bacteria, UA Innumerable (A) /hpf     UA (URINE) with reflex to Microscopic [[id number]]  (Abnormal) Collected:  12/14/18 1538    Lab Status:  Final result Specimen:  Urine from Urine, Suprapubic catheter Updated:  12/14/18 1553     Color, UA Yellow     Clarity, UA Slightly Cloudy     Specific [place], UA 1 010     pH, UA 7 0     Leukocytes, UA Large (A)     Nitrite, UA Positive (A)     Protein, UA 30 (1+) (A) mg/dl      Glucose, UA Negative mg/dl      Ketones, UA Negative mg/dl      Urobilinogen, UA 0 2 E U /dl      Bilirubin, UA Negative     Blood, UA Small (A)                 No orders to display Procedures  Procedures       Phone Contacts  ED Phone Contact    ED Course                               MDM  Number of Diagnoses or Management Options  UTI (urinary tract infection):   Diagnosis management comments: 79 yo M with pmh urinary cancer with ileal conduit presents with concern for UTI with weakness  Patient declines any lab testing except for UA, ua shows UTI, will treat with keflex, vital signs normal, doubt sepsis at this time, gave strict return precautions for worsening symptoms for further workup in ED if not improved  Follow up with urology    CritCare Time    Disposition  Final diagnoses:   UTI (urinary tract infection)     Time reflects when diagnosis was documented in both MDM as applicable and the Disposition within this note     Time User Action Codes Description Comment    12/14/2018  4:09 PM Clara Schafer Add [N39 0] UTI (urinary tract infection)       ED Disposition     ED Disposition Condition Comment    Discharge  [address] discharge to home/self care      Condition at discharge: Good        Follow-up Information     Follow up With Specialties Details Why Contact Info    your urologist  Schedule an appointment as soon as possible for a visit            Discharge Medication List as of 12/14/2018  4:13 PM      START taking these medications    Details   cephalexin (KEFLEX) 250 mg capsule Take 2 capsules (500 mg total) by mouth 4 (four) times a day for 10 days, Starting Fri 12/14/2018, Until Mon 12/24/2018, Print         CONTINUE these medications which have NOT CHANGED    Details   ALPRAZolam (XANAX) 0 5 mg tablet Take 0 5 mg by mouth daily as needed, Starting Mon 8/27/2018, Historical Med      aspirin (ECOTRIN LOW STRENGTH) 81 mg EC tablet Take 81 mg by mouth daily, Historical Med      carvedilol (COREG) 6 25 mg tablet Take 6 25 mg by mouth 2 (two) times a day with meals, Historical Med      furosemide (LASIX) 40 mg tablet Take 1 tablet (40 mg total) by mouth 2 (two) times a day, Starting Sun 8/26/2018, Print      potassium chloride (K-DUR,KLOR-CON) 20 mEq tablet Take 1 tablet (20 mEq total) by mouth 2 (two) times a day, Starting Sun 8/26/2018, Normal           No discharge procedures on file      ED Provider  Electronically Signed by           Vicki Head MD  12/14/18 1547

## 2018-12-14 NOTE — DISCHARGE INSTRUCTIONS
Take antibiotic as prescribed  If you do not feel improve return to ED for re-evaluation    Follow up with the urologist

## 2018-12-18 ENCOUNTER — OFFICE VISIT (OUTPATIENT)
Dept: CARDIOLOGY CLINIC | Facility: CLINIC | Age: 83
End: 2018-12-18
Payer: MEDICARE

## 2018-12-18 VITALS
SYSTOLIC BLOOD PRESSURE: 132 MMHG | WEIGHT: 145 LBS | HEIGHT: 69 IN | DIASTOLIC BLOOD PRESSURE: 80 MMHG | OXYGEN SATURATION: 98 % | HEART RATE: 67 BPM | BODY MASS INDEX: 21.48 KG/M2

## 2018-12-18 DIAGNOSIS — I10 ESSENTIAL HYPERTENSION: Primary | Chronic | ICD-10-CM

## 2018-12-18 DIAGNOSIS — I48.0 PAROXYSMAL ATRIAL FIBRILLATION (HCC): ICD-10-CM

## 2018-12-18 DIAGNOSIS — I50.22 CHRONIC SYSTOLIC CONGESTIVE HEART FAILURE (HCC): ICD-10-CM

## 2018-12-18 DIAGNOSIS — I50.21 ACUTE SYSTOLIC (CONGESTIVE) HEART FAILURE (HCC): ICD-10-CM

## 2018-12-18 DIAGNOSIS — I25.10 CORONARY ARTERY DISEASE INVOLVING NATIVE CORONARY ARTERY OF NATIVE HEART WITHOUT ANGINA PECTORIS: Chronic | ICD-10-CM

## 2018-12-18 PROCEDURE — 99213 OFFICE O/P EST LOW 20 MIN: CPT | Performed by: INTERNAL MEDICINE

## 2018-12-18 NOTE — PROGRESS NOTES
CARDIOLOGY OFFICE VISIT  [facility]  [address], [address] [place] [address], [facility] [place]  Tel: [phone]      NAME: Kp Hall  AGE: 80 y o  SEX: male  : 1930   MRN: [id number]      Chief Complaint: Fatigue   Chief Complaint   Patient presents with    Follow-up     fatigue         History of Present Illness:   Robb Marx is a 80year old male with history of CAD status post CABG, hypertension, chronic systolic congestive heart failure, paroxysmal atrial fibrillation presenting for follow up visit  Patient recently been discontinued from Eliquis for AF due to hematuria  Patient has since been following up with urology, recently received urologic endoscopy which showed no lesions  Unfortunately, he had recently been seen in the ED on friday with UTI symptoms and fatigue and was given keflex and states he has been feeling much better  He denies any incidence of hematuria since taken off eliquis  Is now just on ASA for PAF  He is hesitant about trying new medications and would like to take as few medications as possible  States he is managing his lasix "as needed" for his shortness of breath  States he is doing well from a cardiac standpoint, denies chest pain or discomfort, palpitations, headache, blurred vision, pain or swelling in the extremities        Past Medical History:  Past Medical History:   Diagnosis Date    Atrial fibrillation ([facility] )     CAD (coronary artery disease)     Cancer ([facility] [address] )     bladder    Cerebellar stroke, acute ([facility] ) 2017    CHF (congestive heart failure) (HCC)     Chronic kidney disease     COPD (chronic obstructive pulmonary disease) (HCC)     Hepatitis C     Hypertension     TIA (transient ischemic attack)          Past Surgical History:  Past Surgical History:   Procedure Laterality Date    CAROTID ARTERY ANGIOPLASTY      COLONOSCOPY N/A 3/24/2017    Procedure: COLONOSCOPY;  Surgeon: Vijaya Zayas MD;  Location: [facility]; Service:     CORONARY ARTERY BYPASS GRAFT      CYSTECTOMY      ESOPHAGOGASTRODUODENOSCOPY N/A 3/23/2017    Procedure: ESOPHAGOGASTRODUODENOSCOPY (EGD); Surgeon: Vijaya Zayas MD;  Location: [facility];   Service:    [facility] EYE SURGERY      ILEO CONDUIT           Family History:  Family History   Problem Relation Age of Onset    Coronary artery disease Mother          Social History:  Social History     Social History    Marital status: /Civil Union     Spouse name: N/A    Number of children: N/A    Years of education: N/A     Occupational History    retired      Social History Main Topics    Smoking status: Former Smoker     Quit date: 2/28/1973    Smokeless tobacco: Former User    Alcohol use No    Drug use: No    Sexual activity: No     Other Topics Concern    Not on file     Social History Narrative    No narrative on file         Active Problems:  Patient Active Problem List   Diagnosis    Elevated troponin    JUSTINE (acute kidney injury) ([facility] )    H/O carotid angioplasty    HTN (hypertension)    History of CHF (congestive heart failure)    Melena    SOB (shortness of breath)    Dizziness    Chronic systolic congestive heart failure (HCC)    Atrial fibrillation (HCC)    CKD (chronic kidney disease) stage 4, GFR 15-29 ml/min (HCC)    Anemia    Hypertensive CKD (chronic kidney disease)    Status post ileal conduit (HCC)    Generalized weakness    Urinary tract infection    History of cerebellar stroke    Hematuria    History of CVA (cerebrovascular accident)    CAD (coronary artery disease)    Malignant neoplasm of urinary bladder (HCC)    Acute systolic (congestive) heart failure (HCC)    Chronic kidney disease (CKD), stage IV (severe) ([facility] [address] [facility] )    Gross hematuria    Hypokalemia         The following portions of the patient's history were reviewed and updated as appropriate: past medical history, past surgical history, past family history,  past social history, current medications, allergies and problem list       Review of Systems:  Constitutional: Denies fever, chills, fatigue  Eyes: Denies eye redness, eye discharge, double vision  ENT: Denies hearing loss, tinnitus, sneezing, nasal discharge, sore throat   Respiratory: Denies cough, expectoration, hemoptysis, shortness of breath  Cardiovascular: Denies chest pain, palpitations, orthopnea, PND, lower extremity swelling  Gastrointestinal: Denies abdominal pain, nausea, vomiting, hematemesis, diarrhea, bloody stools  Genito-Urinary: Denies dysuria, incontinence  Musculoskeletal: Denies back pain, joint pain, muscle pain  Neurologic: Denies confusion, lightheadedness, syncope, headache, focal weakness, sensory changes, seizures  Endocrine: Denies polyuria, polydipsia, temperature intolerance  Allergy and Immunology: Denies hives, insect bite sensitivity  Hematological and Lymphatic: Denies bleeding problems, swollen glands   Psychological: Denies depression, suicidal ideation, anxiety, panic  Dermatological: Denies pruritus, rash, skin lesion changes      Vitals:  Vitals:    12/18/18 1301   BP: 132/80   Pulse: 67   SpO2: 98%       Body mass index is 21 41 kg/m²  Weight (last 2 days)     Date/Time   Weight    12/18/18 1301  65 8 (145)                Physical Examination:  General: Patient is not in acute distress  Awake, alert, oriented in time, place and person  Responding to commands  Head: Normocephalic  Atraumatic  Eyes: Both pupils normal sized, round and reactive to light  Nonicteric  ENT: Normal external ear canals  Nares normal, no drainage  Lips and oral mucosa normal  Neck: Supple  JVP not raised  Trachea central  No thyromegaly  Lungs: Bilateral bronchovascular breath sounds with no crackles or rhonchi  Chest wall: No tenderness  Cardiovascular: +distant heart sounds  RRR  S1 and S2 normal  No murmur, rub or gallop  Gastrointestinal: Abdomen soft, nontender   No guarding or rigidity  Liver and spleen not palpable  Bowel sounds present  Neurologic: Patient is awake, alert, oriented in time, place and person  Responding to command  Moving all extremities  Integumentary:  No skin rash  Lymphatic: No cervical lymphadenopathy  Back: Symmetric   No CVA tenderness  Extremities: No clubbing, cyanosis or edema      Laboratory Results:  CBC with diff:   Lab Results   Component Value Date    WBC 4 96 2018    RBC 4 07 2018    HGB 10 6 (L) 2018    HCT 33 6 (L) 2018    MCV 83 2018    MCH 26 0 (L) 2018    RDW 15 6 (H) 2018     (L) 2018       CMP:  Lab Results   Component Value Date    CREATININE 2 16 (H) 2018    BUN 35 (H) 2018    K 3 5 2018     2018    CO2 29 2018    ALKPHOS 94 2018    ALT 19 2018    AST 21 2018    BILIDIR 0 19 2018       Lab Results   Component Value Date    HGBA1C 5 5 2017    MG 2 0 2018    PHOS 2 9 2017       Lab Results   Component Value Date    TROPONINI 0 02 2018    TROPONINI 0 04 2018    TROPONINI 0 04 2018       Lipid Profile:   No results found for: CHOL  Lab Results   Component Value Date    HDL 48 2017     Lab Results   Component Value Date    LDLCALC 79 2017     Lab Results   Component Value Date    TRIG 72 2017       Cardiac testing:   Results for orders placed during the hospital encounter of 18   Echo complete with contrast if indicated    Narrative [address] [place] [phone]    Transthoracic Echocardiogram  2D, M-mode, Doppler, and Color Doppler    Study date:  24-Aug-2018    Patient: Terra Mccracken  MR number: [id number]  Account number: [de-identified]  : 1930  Age: 80 years  Gender: Male  Status: Inpatient  Location: Bedside  Height: 69 in  Weight: 163 lb  BP: 120/ 85 mmHg    Indications: Heart Failure    Diagnoses: I50 9 - Heart failure, unspecified    Sonographer:  Hira Knapp BS,RDCS  Interpreting Physician:  Salvador Stovall MD  Referring Physician:  Rosangela Zazueta PA-C  Group:  [facility]    SUMMARY    LEFT VENTRICLE:  Ejection fraction was estimated to be 40 % to 45%  Inferior and lateral walls appear hypokinetic  RIGHT VENTRICLE:  Estimated peak pressure was at least 45 mmHg  LEFT ATRIUM:  The atrium was mildly to moderately dilated  RIGHT ATRIUM:  The atrium was dilated  MITRAL VALVE:  There was moderate to severe regurgitation  Similar to echocardiogram from Feb 2017  AORTIC VALVE:  There was trace regurgitation  TRICUSPID VALVE:  There was mild regurgitation  IVC, HEPATIC VEINS:  The inferior vena cava was dilated  PERICARDIUM:  There was a moderate to large -sized left pleural effusion  HISTORY: PRIOR HISTORY: Elevated Troponins, Hypertension, Coronary Artery Disease, Acute Kidney Injury, History of Congestive Heart Failure, History of Cerebral Vascular Accident, Shortness of Breath, Dizziness, Atrial Fibrillation    PROCEDURE: The procedure was performed at the bedside  This was a routine study  The transthoracic approach was used  The study included complete 2D imaging, M-mode, complete spectral Doppler, and color Doppler  The heart rate was 89 bpm,  at the start of the study  Images were obtained from the parasternal, apical, subcostal, and suprasternal notch acoustic windows  Echocardiographic views were limited due to poor acoustic window availability, decreased penetration, and  lung interference  This was a technically difficult study  LEFT VENTRICLE: Size was normal  Ejection fraction was estimated to be 40 % to 45%  Inferior and lateral walls appear hypokinetic  DOPPLER: There was an increased relative contribution of atrial contraction to ventricular filling      RIGHT VENTRICLE: The size was normal  Systolic function was normal  Wall thickness was normal  DOPPLER: Estimated peak pressure was at least 45 mmHg  LEFT ATRIUM: The atrium was mildly to moderately dilated  RIGHT ATRIUM: The atrium was dilated  MITRAL VALVE: There was annular calcification  DOPPLER: There was moderate to severe regurgitation  Similar to echocardiogram from 2017  AORTIC VALVE: The valve was not well visualized  DOPPLER: There was trace regurgitation  TRICUSPID VALVE: The valve structure was normal  There was normal leaflet separation  DOPPLER: The transtricuspid velocity was within the normal range  There was no evidence for stenosis  There was mild regurgitation  PULMONIC VALVE: Leaflets exhibited normal thickness, no calcification, and normal cuspal separation  DOPPLER: The transpulmonic velocity was within the normal range  There was no regurgitation  PERICARDIUM: There was no pericardial effusion  There was a moderate to large -sized left pleural effusion  The pericardium was normal in appearance  AORTA: The root exhibited normal size  SYSTEMIC VEINS: IVC: The inferior vena cava was dilated      SYSTEM MEASUREMENT TABLES    2D  %FS: 8 2 %  Ao Diam: 3 5 cm  EDV(Teich): 107 4 ml  EF Biplane: 35 1 %  EF(Teich): 18 2 %  ESV(Teich): 87 9 ml  IVSd: 1 2 cm  LA Area: 30 9 cm2  LA Diam: 4 1 cm  LVEDV MOD A2C: 167 1 ml  LVEDV MOD A4C: 170 7 ml  LVEDV MOD BP: 169 2 ml  LVEF MOD A2C: 26 6 %  LVEF MOD A4C: 42 6 %  LVESV MOD A2C: 122 6 ml  LVESV MOD A4C: 98 1 ml  LVESV MOD BP: 109 7 ml  LVIDd: 4 8 cm  LVIDs: 4 4 cm  LVLd A2C: 9 7 cm  LVLd A4C: 9 8 cm  LVLs A2C: 9 3 cm  LVLs A4C: 9 4 cm  LVPWd: 1 cm  RA Area: 21 6 cm2  RVIDd: 3 3 cm  SV MOD A2C: 44 5 ml  SV MOD A4C: 72 6 ml  SV(Teich): 19 6 ml    CW  MR VTI: 161 2 cm  MR Vmax: 5 7 m/s  MR Vmean: 4 3 m/s  MR maxP 3 mmHg  MR meanP 1 mmHg  TR Vmax: 3 4 m/s  TR maxP 4 mmHg    MM  TAPSE: 1 6 cm    [employer] Accredited Echocardiography Laboratory    Prepared and electronically signed by    Marilee Gilmore MD  Signed 24-Aug-2018 18:53:36       No results found for this or any previous visit  No results found for this or any previous visit  No procedure found  No results found for this or any previous visit  Imaging: I have personally reviewed pertinent films in PACS  No results found  Medications:    Current Outpatient Prescriptions:     ALPRAZolam (XANAX) 0 5 mg tablet, Take 0 5 mg by mouth daily as needed, Disp: , Rfl: 0    aspirin (ECOTRIN LOW STRENGTH) 81 mg EC tablet, Take 81 mg by mouth daily, Disp: , Rfl:     carvedilol (COREG) 6 25 mg tablet, Take 6 25 mg by mouth 2 (two) times a day with meals, Disp: , Rfl:     cephalexin (KEFLEX) 250 mg capsule, Take 2 capsules (500 mg total) by mouth 4 (four) times a day for 10 days, Disp: 80 capsule, Rfl: 0    furosemide (LASIX) 40 mg tablet, Take 1 tablet (40 mg total) by mouth 2 (two) times a day (Patient taking differently: Take 40 mg by mouth daily  ), Disp: 30 tablet, Rfl: 0    potassium chloride (K-DUR,KLOR-CON) 20 mEq tablet, Take 1 tablet (20 mEq total) by mouth 2 (two) times a day, Disp: 30 tablet, Rfl: 0      Allergies:  No Known Allergies      Assessment and Plan:  1  Essential hypertension  At home BP readings show average BP 130s/80s  Highest reading 149/85  Will continue Coreg 12 5mg      2  Coronary artery disease involving native coronary artery of native heart without angina pectoris  Without CP or anginal equivalent  Continue ASA, BB      3  Chronic systolic congestive heart failure (HCC)  Euvolemic  Continue BB, ASA, lasix 40mg BID  4  Paroxysmal atrial fibrillation (HCC)  Spoke to patient at length regarding risks vs benefits of anticoagulation therapy  Patient has been realistic regarding expectations given advanced age, would like to try holter monitor to assess frequency of AF in order to adequetely assess need to proceed with anticoagulation   - Holter monitor - 48 hour;  Future    Recommend aggressive risk factor modification and therapeutic lifestyle changes  Low-salt, low-calorie, low-fat, low-cholesterol diet with regular exercise and to optimize weight  I will defer the ordering and monitoring of necessity lab studies to you, but I am available and happy to review and manage any of the data at your request in the future  Discussed concepts of atherosclerosis, including signs and symptoms of cardiac disease  Previous studies were reviewed  Safety measures were reviewed  Questions were entertained and answered  Patient was advised to report any problems requiring medical attention  Follow-up with PCP and appropriate specialist and lab work as discussed  Return for follow up visit as scheduled or earlier, if needed  Thank you for allowing me to participate in the care and evaluation of your patient  Should you have any questions, please feel free to contact me        Catrachito Yen PA-C  12/18/2018,2:01 PM

## 2018-12-18 NOTE — LETTER
2018     MD Jannie Cline [address] [facility]    Patient: Hayde Kc   YOB: 1930   Date of Visit: 2018       Dear Dr Chaya Diaz: Thank you for referring Hayde Kc to me for evaluation  Below are my notes for this consultation  If you have questions, please do not hesitate to call me  I look forward to following your patient along with you  Sincerely,        Julian Goldman, DO        CC: No Recipients  Rajwinder Montano  2018  5:46 PM  Attested  CARDIOLOGY OFFICE VISIT  [facility]  [facility], [place], [address] [facility], [address] [place] [address]  Tel: [phone]      NAME: Kp Hall  AGE: 80 y o  SEX: male  : 1930   MRN: [id number]      Chief Complaint: Fatigue   Chief Complaint   Patient presents with    Follow-up     fatigue         History of Present Illness:   Hayde Kc is a 80year old male with history of CAD status post CABG, hypertension, chronic systolic congestive heart failure, paroxysmal atrial fibrillation presenting for follow up visit  Patient recently been discontinued from Eliquis for AF due to hematuria  Patient has since been following up with urology, recently received urologic endoscopy which showed no lesions  Unfortunately, he had recently been seen in the ED on friday with UTI symptoms and fatigue and was given keflex and states he has been feeling much better  He denies any incidence of hematuria since taken off eliquis  Is now just on ASA for PAF  He is hesitant about trying new medications and would like to take as few medications as possible  States he is managing his lasix "as needed" for his shortness of breath  States he is doing well from a cardiac standpoint, denies chest pain or discomfort, palpitations, headache, blurred vision, pain or swelling in the extremities        Past Medical History:  Past Medical History:   Diagnosis Date    Atrial fibrillation ([place] [address] )     CAD (coronary artery disease)     Cancer (HCC)     bladder    Cerebellar stroke, acute ([place] [address] ) 7/16/2017    CHF (congestive heart failure) (HCC)     Chronic kidney disease     COPD (chronic obstructive pulmonary disease) (HCC)     Hepatitis C     Hypertension     TIA (transient ischemic attack)          Past Surgical History:  Past Surgical History:   Procedure Laterality Date    CAROTID ARTERY ANGIOPLASTY      COLONOSCOPY N/A 3/24/2017    Procedure: COLONOSCOPY;  Surgeon: Oz Falcon MD;  Location: [facility]; Service:     CORONARY ARTERY BYPASS GRAFT      CYSTECTOMY      ESOPHAGOGASTRODUODENOSCOPY N/A 3/23/2017    Procedure: ESOPHAGOGASTRODUODENOSCOPY (EGD); Surgeon: Oz Falcon MD;  Location: [facility];   Service:     EYE SURGERY      ILEO CONDUIT           Family History:  Family History   Problem Relation Age of Onset    Coronary artery disease Mother          Social History:  Social History     Social History    Marital status: /Civil Union     Spouse name: N/A    Number of children: N/A    Years of education: N/A     Occupational History    retired      Social History Main Topics    Smoking status: Former Smoker     Quit date: 2/28/1973    Smokeless tobacco: Former User    Alcohol use No    Drug use: No    Sexual activity: No     Other Topics Concern    Not on file     Social History Narrative    No narrative on file         Active Problems:  Patient Active Problem List   Diagnosis    Elevated troponin    JUSTINE (acute kidney injury) ([place] [address] )    H/O carotid angioplasty    HTN (hypertension)    History of CHF (congestive heart failure)    Melena    SOB (shortness of breath)    Dizziness    Chronic systolic congestive heart failure (HCC)    Atrial fibrillation (HCC)    CKD (chronic kidney disease) stage 4, GFR 15-29 ml/min (HCC)    Anemia    Hypertensive CKD (chronic kidney disease)    Status post ileal conduit ([place] [address] )    Generalized weakness    Urinary tract infection    History of cerebellar stroke    Hematuria    History of CVA (cerebrovascular accident)    CAD (coronary artery disease)    Malignant neoplasm of urinary bladder (HCC)    Acute systolic (congestive) heart failure (HCC)    Chronic kidney disease (CKD), stage IV (severe) (HCC)    Gross hematuria    Hypokalemia         The following portions of the patient's history were reviewed and updated as appropriate: past medical history, past surgical history, past family history,  past social history, current medications, allergies and problem list       Review of Systems:  Constitutional: Denies fever, chills, fatigue  Eyes: Denies eye redness, eye discharge, double vision  ENT: Denies hearing loss, tinnitus, sneezing, nasal discharge, sore throat   Respiratory: Denies cough, expectoration, hemoptysis, shortness of breath  Cardiovascular: Denies chest pain, palpitations, orthopnea, PND, lower extremity swelling  Gastrointestinal: Denies abdominal pain, nausea, vomiting, hematemesis, diarrhea, bloody stools  Genito-Urinary: Denies dysuria, incontinence  Musculoskeletal: Denies back pain, joint pain, muscle pain  Neurologic: Denies confusion, lightheadedness, syncope, headache, focal weakness, sensory changes, seizures  Endocrine: Denies polyuria, polydipsia, temperature intolerance  Allergy and Immunology: Denies hives, insect bite sensitivity  Hematological and Lymphatic: Denies bleeding problems, swollen glands   Psychological: Denies depression, suicidal ideation, anxiety, panic  Dermatological: Denies pruritus, rash, skin lesion changes      Vitals:  Vitals:    12/18/18 1301   BP: 132/80   Pulse: 67   SpO2: 98%       Body mass index is 21 41 kg/m²  Weight (last 2 days)     Date/Time   Weight    12/18/18 1301  65 8 (145)                Physical Examination:  General: Patient is not in acute distress  Awake, alert, oriented in time, place and person   Responding to commands  Head: Normocephalic  Atraumatic  Eyes: Both pupils normal sized, round and reactive to light  Nonicteric  ENT: Normal external ear canals  Nares normal, no drainage  Lips and oral mucosa normal  Neck: Supple  JVP not raised  Trachea central  No thyromegaly  Lungs: Bilateral bronchovascular breath sounds with no crackles or rhonchi  Chest wall: No tenderness  Cardiovascular: +distant heart sounds  RRR  S1 and S2 normal  No murmur, rub or gallop  Gastrointestinal: Abdomen soft, nontender  No guarding or rigidity  Liver and spleen not palpable  Bowel sounds present  Neurologic: Patient is awake, alert, oriented in time, place and person  Responding to command  Moving all extremities  Integumentary:  No skin rash  Lymphatic: No cervical lymphadenopathy  Back: Symmetric   No CVA tenderness  Extremities: No clubbing, cyanosis or edema      Laboratory Results:  CBC with diff:   Lab Results   Component Value Date    WBC 4 96 09/01/2018    RBC 4 07 09/01/2018    HGB 10 6 (L) 09/01/2018    HCT 33 6 (L) 09/01/2018    MCV 83 09/01/2018    MCH 26 0 (L) 09/01/2018    RDW 15 6 (H) 09/01/2018     (L) 09/01/2018       CMP:  Lab Results   Component Value Date    CREATININE 2 16 (H) 09/01/2018    BUN 35 (H) 09/01/2018    K 3 5 09/01/2018     09/01/2018    CO2 29 09/01/2018    ALKPHOS 94 08/31/2018    ALT 19 08/31/2018    AST 21 08/31/2018    BILIDIR 0 19 07/11/2018       Lab Results   Component Value Date    HGBA1C 5 5 07/17/2017    MG 2 0 08/31/2018    PHOS 2 9 12/20/2017       Lab Results   Component Value Date    TROPONINI 0 02 08/31/2018    TROPONINI 0 04 08/24/2018    TROPONINI 0 04 08/24/2018       Lipid Profile:   No results found for: CHOL  Lab Results   Component Value Date    HDL 48 07/17/2017     Lab Results   Component Value Date    LDLCALC 79 07/17/2017     Lab Results   Component Value Date    TRIG 72 07/17/2017       Cardiac testing:   Results for orders placed during the hospital encounter of 18   Echo complete with contrast if indicated    Narrative [address] [place] [id number]  [phone]    Transthoracic Echocardiogram  2D, M-mode, Doppler, and Color Doppler    Study date:  24-Aug-2018    Patient: Heriberto Hallman  MR number: [id number]  Account number: [de-identified]  : 1930  Age: 80 years  Gender: Male  Status: Inpatient  Location: Bedside  Height: 69 in  Weight: 163 lb  BP: 120/ 85 mmHg    Indications: Heart Failure    Diagnoses: I50 9 - Heart failure, unspecified    Sonographer:  LEILA Perez,RDCS  Interpreting Physician:  Zakia Rodriguez MD  Referring Physician:  Vince Mejia PA-C  Group:  [facility]    SUMMARY    LEFT VENTRICLE:  Ejection fraction was estimated to be 40 % to 45%  Inferior and lateral walls appear hypokinetic  RIGHT VENTRICLE:  Estimated peak pressure was at least 45 mmHg  LEFT ATRIUM:  The atrium was mildly to moderately dilated  RIGHT ATRIUM:  The atrium was dilated  MITRAL VALVE:  There was moderate to severe regurgitation  Similar to echocardiogram from 2017  AORTIC VALVE:  There was trace regurgitation  TRICUSPID VALVE:  There was mild regurgitation  IVC, HEPATIC VEINS:  The inferior vena cava was dilated  PERICARDIUM:  There was a moderate to large -sized left pleural effusion  HISTORY: PRIOR HISTORY: Elevated Troponins, Hypertension, Coronary Artery Disease, Acute Kidney Injury, History of Congestive Heart Failure, History of Cerebral Vascular Accident, Shortness of Breath, Dizziness, Atrial Fibrillation    PROCEDURE: The procedure was performed at the bedside  This was a routine study  The transthoracic approach was used  The study included complete 2D imaging, M-mode, complete spectral Doppler, and color Doppler  The heart rate was 89 bpm,  at the start of the study   Images were obtained from the parasternal, apical, subcostal, and suprasternal notch acoustic windows  Echocardiographic views were limited due to poor acoustic window availability, decreased penetration, and  lung interference  This was a technically difficult study  LEFT VENTRICLE: Size was normal  Ejection fraction was estimated to be 40 % to 45%  Inferior and lateral walls appear hypokinetic  DOPPLER: There was an increased relative contribution of atrial contraction to ventricular filling  RIGHT VENTRICLE: The size was normal  Systolic function was normal  Wall thickness was normal  DOPPLER: Estimated peak pressure was at least 45 mmHg  LEFT ATRIUM: The atrium was mildly to moderately dilated  RIGHT ATRIUM: The atrium was dilated  MITRAL VALVE: There was annular calcification  DOPPLER: There was moderate to severe regurgitation  Similar to echocardiogram from Feb 2017  AORTIC VALVE: The valve was not well visualized  DOPPLER: There was trace regurgitation  TRICUSPID VALVE: The valve structure was normal  There was normal leaflet separation  DOPPLER: The transtricuspid velocity was within the normal range  There was no evidence for stenosis  There was mild regurgitation  PULMONIC VALVE: Leaflets exhibited normal thickness, no calcification, and normal cuspal separation  DOPPLER: The transpulmonic velocity was within the normal range  There was no regurgitation  PERICARDIUM: There was no pericardial effusion  There was a moderate to large -sized left pleural effusion  The pericardium was normal in appearance  AORTA: The root exhibited normal size  SYSTEMIC VEINS: IVC: The inferior vena cava was dilated      SYSTEM MEASUREMENT TABLES    2D  %FS: 8 2 %  Ao Diam: 3 5 cm  EDV(Teich): 107 4 ml  EF Biplane: 35 1 %  EF(Teich): 18 2 %  ESV(Teich): 87 9 ml  IVSd: 1 2 cm  LA Area: 30 9 cm2  LA Diam: 4 1 cm  LVEDV MOD A2C: 167 1 ml  LVEDV MOD A4C: 170 7 ml  LVEDV MOD BP: 169 2 ml  LVEF MOD A2C: 26 6 %  LVEF MOD A4C: 42 6 %  LVESV MOD A2C: 122 6 ml  LVESV MOD A4C: 98 1 ml  LVESV MOD BP: 109 7 ml  LVIDd: 4 8 cm  LVIDs: 4 4 cm  LVLd A2C: 9 7 cm  LVLd A4C: 9 8 cm  LVLs A2C: 9 3 cm  LVLs A4C: 9 4 cm  LVPWd: 1 cm  RA Area: 21 6 cm2  RVIDd: 3 3 cm  SV MOD A2C: 44 5 ml  SV MOD A4C: 72 6 ml  SV(Teich): 19 6 ml    CW  MR VTI: 161 2 cm  MR Vmax: 5 7 m/s  MR Vmean: 4 3 m/s  MR maxP 3 mmHg  MR meanP 1 mmHg  TR Vmax: 3 4 m/s  TR maxP 4 mmHg    MM  TAPSE: 1 6 cm    [facility] Accredited Echocardiography [facility]    Prepared and electronically signed by    Steve Singh MD  Signed 24-Aug-2018 18:53:36       No results found for this or any previous visit  No results found for this or any previous visit  No procedure found  No results found for this or any previous visit  Imaging: I have personally reviewed pertinent films in PACS  No results found  Medications:    Current Outpatient Prescriptions:     ALPRAZolam (XANAX) 0 5 mg tablet, Take 0 5 mg by mouth daily as needed, Disp: , Rfl: 0    aspirin (ECOTRIN LOW STRENGTH) 81 mg EC tablet, Take 81 mg by mouth daily, Disp: , Rfl:     carvedilol (COREG) 6 25 mg tablet, Take 6 25 mg by mouth 2 (two) times a day with meals, Disp: , Rfl:     cephalexin (KEFLEX) 250 mg capsule, Take 2 capsules (500 mg total) by mouth 4 (four) times a day for 10 days, Disp: 80 capsule, Rfl: 0    furosemide (LASIX) 40 mg tablet, Take 1 tablet (40 mg total) by mouth 2 (two) times a day (Patient taking differently: Take 40 mg by mouth daily  ), Disp: 30 tablet, Rfl: 0    potassium chloride (K-DUR,KLOR-CON) 20 mEq tablet, Take 1 tablet (20 mEq total) by mouth 2 (two) times a day, Disp: 30 tablet, Rfl: 0      Allergies:  No Known Allergies      Assessment and Plan:  1  Essential hypertension  At home BP readings show average BP 130s/80s  Highest reading 149/85   Will continue Coreg 12 5mg      2  Coronary artery disease involving native coronary artery of native heart without angina pectoris  Without CP or anginal equivalent  Continue ASA, BB      3  Chronic systolic congestive heart failure (HCC)  Euvolemic  Continue BB, ASA, lasix 40mg BID  4  Paroxysmal atrial fibrillation (HCC)  Spoke to patient at length regarding risks vs benefits of anticoagulation therapy  Patient has been realistic regarding expectations given advanced age, would like to try holter monitor to assess frequency of AF in order to adequetely assess need to proceed with anticoagulation   - Holter monitor - 48 hour; Future    Recommend aggressive risk factor modification and therapeutic lifestyle changes  Low-salt, low-calorie, low-fat, low-cholesterol diet with regular exercise and to optimize weight  I will defer the ordering and monitoring of necessity lab studies to you, but I am available and happy to review and manage any of the data at your request in the future  Discussed concepts of atherosclerosis, including signs and symptoms of cardiac disease  Previous studies were reviewed  Safety measures were reviewed  Questions were entertained and answered  Patient was advised to report any problems requiring medical attention  Follow-up with PCP and appropriate specialist and lab work as discussed  Return for follow up visit as scheduled or earlier, if needed  Thank you for allowing me to participate in the care and evaluation of your patient  Should you have any questions, please feel free to contact me  Ashly Abrams PA-C  12/18/2018,2:01 PM    Attestation signed by Adelfo Mehta DO at 12/20/2018  5:46 PM:  I supervised the Advanced Practitioner  I discussed the case with the Advanced Practitioner and reviewed the AP note, prescribed medications, and orders placed      Adelfo Mehta DO 12/20/18

## 2018-12-20 ENCOUNTER — HOSPITAL ENCOUNTER (OUTPATIENT)
Dept: NON INVASIVE DIAGNOSTICS | Facility: CLINIC | Age: 83
Discharge: HOME | End: 2018-12-20
Payer: MEDICARE

## 2018-12-20 DIAGNOSIS — I48.0 PAROXYSMAL ATRIAL FIBRILLATION (HCC): ICD-10-CM

## 2018-12-20 PROCEDURE — 93226 XTRNL ECG REC<48 HR SCAN A/R: CPT

## 2018-12-20 PROCEDURE — 93225 XTRNL ECG REC<48 HRS REC: CPT

## 2018-12-26 PROCEDURE — 93227 XTRNL ECG REC<48 HR R&I: CPT | Performed by: INTERNAL MEDICINE

## 2019-01-04 ENCOUNTER — TELEPHONE (OUTPATIENT)
Dept: UROLOGY | Facility: MEDICAL CENTER | Age: 84
End: 2019-01-04

## 2019-01-04 ENCOUNTER — APPOINTMENT (OUTPATIENT)
Dept: LAB | Facility: HOSPITAL | Age: 84
End: 2019-01-04
Payer: MEDICARE

## 2019-01-04 DIAGNOSIS — N30.00 ACUTE CYSTITIS WITHOUT HEMATURIA: Primary | ICD-10-CM

## 2019-01-04 DIAGNOSIS — R39.9 UTI SYMPTOMS: ICD-10-CM

## 2019-01-04 DIAGNOSIS — R39.9 UTI SYMPTOMS: Primary | ICD-10-CM

## 2019-01-04 LAB
BACTERIA UR QL AUTO: ABNORMAL /HPF
BILIRUB UR QL STRIP: NEGATIVE
CLARITY UR: ABNORMAL
COLOR UR: YELLOW
GLUCOSE UR STRIP-MCNC: NEGATIVE MG/DL
HGB UR QL STRIP.AUTO: ABNORMAL
KETONES UR STRIP-MCNC: NEGATIVE MG/DL
LEUKOCYTE ESTERASE UR QL STRIP: ABNORMAL
NITRITE UR QL STRIP: NEGATIVE
NON-SQ EPI CELLS URNS QL MICRO: ABNORMAL /HPF
PH UR STRIP.AUTO: 6 [PH] (ref 4.5–8)
PROT UR STRIP-MCNC: ABNORMAL MG/DL
RBC #/AREA URNS AUTO: ABNORMAL /HPF
SP GR UR STRIP.AUTO: 1.01 (ref 1–1.03)
UROBILINOGEN UR QL STRIP.AUTO: 0.2 E.U./DL
WBC #/AREA URNS AUTO: ABNORMAL /HPF

## 2019-01-04 PROCEDURE — 87077 CULTURE AEROBIC IDENTIFY: CPT

## 2019-01-04 PROCEDURE — 81001 URINALYSIS AUTO W/SCOPE: CPT

## 2019-01-04 PROCEDURE — 87186 SC STD MICRODIL/AGAR DIL: CPT

## 2019-01-04 PROCEDURE — 87086 URINE CULTURE/COLONY COUNT: CPT

## 2019-01-04 NOTE — TELEPHONE ENCOUNTER
Patient managed by Jacqueline Appiah at the [facility] office  Called and spoke to patients daughter Zander Ignacio  Patient is reporting weakness and cloudy urine  Patient denies any fevers, chills, hematuria, nausea, vomiting  Urostomy draining urine  Patient reported having diarrhea last night  Patient was diagnosed with UTI when he was in the ER on 12/14/18 and was on antibiotics  Patient daughter reports that he finished antibiotics antibiotics 5-7 days ago  Patient was prescribed keflex on 12/14/18 when he was in the ER  Patients daughter aware that patient should go to the lab to have urinalysis and urine culture to check for infection  Patient daughter reports that they will take him to out patient [facility] lab today  Daughter also aware that message will be sent to physician assistant for any further recommendations  Patient has follow up appointment on 1/15/19 with Jacqueline Appiah

## 2019-01-04 NOTE — TELEPHONE ENCOUNTER
Called and spoke to daughter and made her aware that per Dr Pacheco and Roseanne Hallman PA-C that they would not recommend taking antibiotics at this time  Made her aware that they recommend monitoring for urine culture results for sensitivity  Made her aware that patient should remain well hydrated and continue to monitor in the meantime  Daughter verbalized understanding and denies any other questions or concerns at this time

## 2019-01-06 LAB — BACTERIA UR CULT: ABNORMAL

## 2019-01-07 ENCOUNTER — TELEPHONE (OUTPATIENT)
Dept: UROLOGY | Facility: CLINIC | Age: 84
End: 2019-01-07

## 2019-01-07 DIAGNOSIS — N30.00 ACUTE CYSTITIS WITHOUT HEMATURIA: Primary | ICD-10-CM

## 2019-01-07 RX ORDER — AMOXICILLIN AND CLAVULANATE POTASSIUM 500; 125 MG/1; MG/1
1 TABLET, FILM COATED ORAL EVERY 12 HOURS SCHEDULED
Qty: 10 TABLET | Refills: 0 | Status: SHIPPED | OUTPATIENT
Start: 2019-01-07 | End: 2019-01-12

## 2019-01-07 NOTE — TELEPHONE ENCOUNTER
Patient with positive urine culture  Please contact the patient to make aware that Augmentin was sent electronically to pharmacy  Should remain well hydrated in the meantime  Thank you

## 2019-01-07 NOTE — TELEPHONE ENCOUNTER
Called and spoke to patients daughter Gaye Mclean [phone]  Made her aware that results showed that patient does have an infection and that antibiotics were sent to the pharmacy for him  Harmony verbalized understanding and denies any other questions or concerns

## 2019-01-15 ENCOUNTER — OFFICE VISIT (OUTPATIENT)
Dept: UROLOGY | Facility: CLINIC | Age: 84
End: 2019-01-15
Payer: MEDICARE

## 2019-01-15 ENCOUNTER — TELEPHONE (OUTPATIENT)
Dept: CARDIOLOGY CLINIC | Facility: CLINIC | Age: 84
End: 2019-01-15

## 2019-01-15 VITALS
SYSTOLIC BLOOD PRESSURE: 118 MMHG | HEART RATE: 82 BPM | WEIGHT: 150 LBS | DIASTOLIC BLOOD PRESSURE: 78 MMHG | HEIGHT: 68 IN | BODY MASS INDEX: 22.73 KG/M2

## 2019-01-15 DIAGNOSIS — C67.9 MALIGNANT NEOPLASM OF URINARY BLADDER, UNSPECIFIED SITE (HCC): Primary | ICD-10-CM

## 2019-01-15 PROCEDURE — 99214 OFFICE O/P EST MOD 30 MIN: CPT | Performed by: UROLOGY

## 2019-01-15 NOTE — TELEPHONE ENCOUNTER
I called the patient's wife and discussed Holter monitor results  The patient is in atrial fibrillation/flutter on Holter, but has had repeated bleeding episodes when anti-coagulated  The patient wishes to maintain a certain quality of life and does not want to resume blood thinning medications at this time    We did discuss risks/benefits of this strategy, and ultimately decided that this was for the [facility]

## 2019-01-15 NOTE — PROGRESS NOTES
Referring Physician: Lemuel Bacon MD  A copy of this note was sent to the referring physician  Diagnoses and all orders for this visit:    Malignant neoplasm of urinary bladder, unspecified site [facility])  -     CT abdomen pelvis wo contrast; Future            Assessment and plan:       1  History of invasive small-cell carcinoma of the bladder  -radical cysto prostatectomy with ileal conduit following neoadjuvant chemotherapy, [facility] Dr Antonio Martinez, 19 years ago  -DALTON on recent imaging    2  Recent episode of gross hematuria  -shortly after changing to a different anticoagulation agent  - history of prior negative looposcopy and imaging  -resolved    3  CT with evidence of polycystic kidney disease, no concerning upper tract lesions    Discussed with the patient and his wife extensively that his symptoms of lightheadedness after taking Lasix, dark urine and cloudy urine are most likely indicative of dehydration and not a true urinary tract infection  The nature of chronic colonization in the setting of a enteric urinary diversion was extensively discussed  His urine culture is always going to be positive as his ileal conduit is made of bowel  I encouraged him to increase his fluid intake  We discussed balancing this with his CKD and cardiac issues  I encouraged him to see his other medical providers for this as well by my simple advice was to drink enough fluid so that the urine from his ileal conduit is clear  He will follow up in 6 months with his next surveillance imaging, non con CT scan abdomen pelvis      Goldie Jarvis MD      Chief Complaint     UTI follow-up      History of Present Illness     Sonja Britt is a 80 y o  male with a history of invasive small-cell carcinoma of the bladder  He underwent a radical cysto prostatectomy by Dr Antonio Martinez at [facility] 19 years ago following neoadjuvant chemotherapy    He has been free of any recurrent disease since that time   He has not had any survivor ship issues related to his conduit until recently  Patient has had 2-3 episodes of may lays with associated lightheadedness  His urine was felt to be dark and mucus was noted from his ileal conduit  He has been evaluated with urine cultures which have been obtained through the stomal appliance both in the emergency department and elsewhere  He has been prescribed multiple rounds of antibiotic for presumed urinary tract infection  Today he is asymptomatic  He notes that he has been lightheaded after taking his morning dose of Lasix and that he has not been drinking much fluid  Detailed Urologic History     - please refer to HPI    Review of Systems     Review of Systems   Constitutional: Negative for activity change and fatigue  HENT: Negative for congestion  Eyes: Negative for visual disturbance  Respiratory: Negative for shortness of breath and wheezing  Cardiovascular: Negative for chest pain and leg swelling  Gastrointestinal: Negative for abdominal pain  Endocrine: Negative for polyuria  Genitourinary: Positive for hematuria  Negative for dysuria, flank pain and urgency  Musculoskeletal: Negative for back pain  Allergic/Immunologic: Negative for immunocompromised state  Neurological: Negative for dizziness and numbness  Psychiatric/Behavioral: Negative for dysphoric mood  All other systems reviewed and are negative  Allergies     No Known Allergies    Physical Exam     Physical Exam   Constitutional: He is oriented to person, place, and time  He appears well-developed and well-nourished  No distress  Elderly male in no apparent distress   HENT:   Head: Normocephalic and atraumatic  Eyes: EOM are normal    Neck: Normal range of motion  Cardiovascular:   Negative lower extremity edema   Pulmonary/Chest: Effort normal and breath sounds normal    Abdominal: Soft     Genitourinary:   Genitourinary Comments: Ileal conduit right lower quadrant pink and productive of clear urine  Lower midline incision noted without hernia  Penile prosthesis is in good position  Musculoskeletal: Normal range of motion  Neurological: He is alert and oriented to person, place, and time  Skin: Skin is warm  Psychiatric: He has a normal mood and affect   His behavior is normal            Vital Signs  Vitals:    01/15/19 1147   BP: 118/78   Pulse: 82   Weight: 68 kg (150 lb)   Height: 5' 8" (1 727 m)         Current Medications       Current Outpatient Prescriptions:     ALPRAZolam (XANAX) 0 5 mg tablet, Take 0 5 mg by mouth daily as needed, Disp: , Rfl: 0    aspirin (ECOTRIN LOW STRENGTH) 81 mg EC tablet, Take 81 mg by mouth daily, Disp: , Rfl:     carvedilol (COREG) 6 25 mg tablet, Take 6 25 mg by mouth 2 (two) times a day with meals, Disp: , Rfl:     furosemide (LASIX) 40 mg tablet, Take 1 tablet (40 mg total) by mouth 2 (two) times a day (Patient taking differently: Take 40 mg by mouth daily  ), Disp: 30 tablet, Rfl: 0    potassium chloride (K-DUR,KLOR-CON) 20 mEq tablet, Take 1 tablet (20 mEq total) by mouth 2 (two) times a day, Disp: 30 tablet, Rfl: 0      Active Problems     Patient Active Problem List   Diagnosis    Elevated troponin    JUSTINE [facility] kidney [facility]) ([facility] [address] )    H/O carotid angioplasty    HTN (hypertension)    History of CHF (congestive heart failure)    Melena    SOB (shortness of breath)    Dizziness    Chronic systolic congestive heart failure (HCC)    Atrial fibrillation (HCC)    CKD (chronic kidney disease) stage 4, GFR 15-29 ml/min (HCC)    Anemia    Hypertensive CKD (chronic kidney disease)    Status post ileal conduit (HCC)    Generalized weakness    Urinary tract infection    History of cerebellar stroke    Hematuria    History of CVA (cerebrovascular accident)    CAD (coronary artery disease)    Malignant neoplasm of urinary bladder (HCC)    Acute systolic (congestive) heart failure (HCC)    Chronic kidney disease (CKD), stage IV (severe) ([facility] [address] )    Gross hematuria    Hypokalemia         Past Medical History     Past Medical History:   Diagnosis Date    Atrial fibrillation ([facility] )     CAD (coronary artery disease)     Cancer (HCC)     bladder    Cerebellar stroke, acute ([facility] ) 7/16/2017    CHF (congestive heart failure) (HCC)     Chronic kidney disease     COPD (chronic obstructive pulmonary disease) (HCC)     Hepatitis C     Hypertension     TIA (transient ischemic attack)          Surgical History     Past Surgical History:   Procedure Laterality Date    CAROTID ARTERY ANGIOPLASTY      COLONOSCOPY N/A 3/24/2017    Procedure: COLONOSCOPY;  Surgeon: Jessica Dalal MD;  Location: [facility]; Service:     CORONARY ARTERY BYPASS GRAFT      CYSTECTOMY      ESOPHAGOGASTRODUODENOSCOPY N/A 3/23/2017    Procedure: ESOPHAGOGASTRODUODENOSCOPY (EGD); Surgeon: Jessica Dalal MD;  Location: [facility]; Service:     EYE SURGERY      ILEO CONDUIT           Family History     Family History   Problem Relation Age of Onset    Coronary artery disease Mother          Social History     Social History     History   Smoking Status    Former Smoker    Quit date: 2/28/1973   Smokeless Tobacco    Former User         Pertinent Lab Values     Lab Results   Component Value Date    CREATININE 2 16 (H) 09/01/2018       No results found for: PSA    @RESULTRCNT(1H])@      Pertinent Imaging     FINDINGS:     ABDOMEN     LOWER CHEST: Large left-sided pleural effusion  Mild atelectasis at the right lung base  Atelectasis at the left lung base  LIVER/BILIARY TREE:  Unremarkable      GALLBLADDER:  Multiple gallstones in the gallbladder      SPLEEN:  Scattered splenic granulomata      PANCREAS:  Unremarkable      ADRENAL GLANDS:  Unremarkable      KIDNEYS/URETERS:  Multiple bilateral renal cysts    Scattered calcifications which appear to be related to the cysts      STOMACH AND BOWEL:  No obvious hydronephrosis although difficult to evaluate in the presence of multiple cysts      APPENDIX:  No findings to suggest appendicitis      ABDOMINOPELVIC CAVITY:  No ascites or free intraperitoneal air  No lymphadenopathy      VESSELS:  Unremarkable for patient's age      PELVIS     REPRODUCTIVE ORGANS:  Penile implant and reservoir in place      URINARY BLADDER:  A cystectomy has been performed      ABDOMINAL WALL/INGUINAL REGIONS:  Ileal loop in the right lower quadrant      OSSEOUS STRUCTURES:  Degenerative changes in the lumbar spine  1st degree anterolisthesis of L4 with respect to L5      IMPRESSION:        1  Large left-sided pleural effusion  2   Bibasilar atelectasis  3   Cholelithiasis  4   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  5   Cystectomy and ileal loop  6  Penile implant and reservoir  Portions of the record may have been created with voice recognition software   Occasional wrong word or "sound a like" substitutions may have occurred due to the inherent limitations of voice recognition software   Read the chart carefully and recognize, using context, where substitutions have occurred

## 2019-01-15 NOTE — LETTER
January 15, 2019     Lexi Harkins MD  [id number] [facility]    Patient: Lucy Benavides   YOB: 1930   Date of Visit: 1/15/2019       Dear Dr Harsh Briones: Thank you for referring Lucy Benavides to me for evaluation  Below are my notes for this consultation  If you have questions, please do not hesitate to call me  I look forward to following your patient along with you  Sincerely,        Yandel Norman MD        CC: MD Yandel Wright MD  1/15/2019 12:55 PM  Sign at close encounter  Referring Physician: Lexi Harkins MD  A copy of this note was sent to the referring physician  Diagnoses and all orders for this visit:    Malignant neoplasm of urinary bladder, unspecified site [facility])  -     CT abdomen pelvis wo contrast; Future            Assessment and plan:       1  History of invasive small-cell carcinoma of the bladder  -radical cysto prostatectomy with ileal conduit following [place] [facility] [place] 19 years ago  -DALTON on recent imaging    2  Recent episode of gross hematuria  -shortly after changing to a different anticoagulation agent  - history of prior negative looposcopy and imaging  -resolved    3  CT with evidence of polycystic kidney disease, no concerning upper tract lesions    Discussed with the patient and his wife extensively that his symptoms of lightheadedness after taking Lasix, dark urine and cloudy urine are most likely indicative of dehydration and not a true urinary tract infection  The nature of chronic colonization in the setting of a enteric urinary diversion was extensively discussed  His urine culture is always going to be positive as his ileal conduit is made of bowel  I encouraged him to increase his fluid intake  We discussed balancing this with his CKD and cardiac issues    I encouraged him to see his other medical providers for this as well by my simple advice was to drink enough fluid so that the urine from his ileal conduit is clear  He will follow up in 6 months with his next surveillance imaging, non con CT scan abdomen pelvis      Sandra Orozco MD      Chief Complaint     UTI follow-up      History of Present Illness     Annette Barber is a 80 y o  male with a history of invasive small-cell carcinoma of the bladder  He underwent a radical cysto prostatectomy by Dr Debi Gutierrez at [facility] 19 years ago following neoadjuvant chemotherapy  He has been free of any recurrent disease since that time  He has not had any survivor ship issues related to his conduit until recently  Patient has had 2-3 episodes of may lays with associated lightheadedness  His urine was felt to be dark and mucus was noted from his ileal conduit  He has been evaluated with urine cultures which have been obtained through the stomal appliance both in the emergency department and elsewhere  He has been prescribed multiple rounds of antibiotic for presumed urinary tract infection  Today he is asymptomatic  He notes that he has been lightheaded after taking his morning dose of Lasix and that he has not been drinking much fluid  Detailed Urologic History     - please refer to HPI    Review of Systems     Review of Systems   Constitutional: Negative for activity change and fatigue  HENT: Negative for congestion  Eyes: Negative for visual disturbance  Respiratory: Negative for shortness of breath and wheezing  Cardiovascular: Negative for chest pain and leg swelling  Gastrointestinal: Negative for abdominal pain  Endocrine: Negative for polyuria  Genitourinary: Positive for hematuria  Negative for dysuria, flank pain and urgency  Musculoskeletal: Negative for back pain  Allergic/Immunologic: Negative for immunocompromised state  Neurological: Negative for dizziness and numbness  Psychiatric/Behavioral: Negative for dysphoric mood     All other systems reviewed and are negative  Allergies     No Known Allergies    Physical Exam     Physical Exam   Constitutional: He is oriented to person, place, and time  He appears well-developed and well-nourished  No distress  Elderly male in no apparent distress   HENT:   Head: Normocephalic and atraumatic  Eyes: EOM are normal    Neck: Normal range of motion  Cardiovascular:   Negative lower extremity edema   Pulmonary/Chest: Effort normal and breath sounds normal    Abdominal: Soft  Genitourinary:   Genitourinary Comments: Ileal conduit right lower quadrant pink and productive of clear urine  Lower midline incision noted without hernia  Penile prosthesis is in good position  Musculoskeletal: Normal range of motion  Neurological: He is alert and oriented to person, place, and time  Skin: Skin is warm  Psychiatric: He has a normal mood and affect   His behavior is normal            Vital Signs  Vitals:    01/15/19 1147   BP: 118/78   Pulse: 82   Weight: 68 kg (150 lb)   Height: 5' 8" (1 727 m)         Current Medications       Current Outpatient Prescriptions:     ALPRAZolam (XANAX) 0 5 mg tablet, Take 0 5 mg by mouth daily as needed, Disp: , Rfl: 0    aspirin (ECOTRIN LOW STRENGTH) 81 mg EC tablet, Take 81 mg by mouth daily, Disp: , Rfl:     carvedilol (COREG) 6 25 mg tablet, Take 6 25 mg by mouth 2 (two) times a day with meals, Disp: , Rfl:     furosemide (LASIX) 40 mg tablet, Take 1 tablet (40 mg total) by mouth 2 (two) times a day (Patient taking differently: Take 40 mg by mouth daily  ), Disp: 30 tablet, Rfl: 0    potassium chloride (K-DUR,KLOR-CON) 20 mEq tablet, Take 1 tablet (20 mEq total) by mouth 2 (two) times a day, Disp: 30 tablet, Rfl: 0      Active Problems     Patient Active Problem List   Diagnosis    Elevated troponin    [facility]INE [facility]) [facility] [address] [facility]    H/O carotid angioplasty    HTN (hypertension)    History of CHF (congestive heart failure)    Melena    SOB (shortness of breath)    Dizziness    Chronic systolic congestive heart failure (HCC)    Atrial fibrillation (HCC)    CKD (chronic kidney disease) stage 4, GFR 15-29 ml/min (HCC)    Anemia    Hypertensive CKD (chronic kidney disease)    Status post ileal conduit (HCC)    Generalized weakness    Urinary tract infection    History of cerebellar stroke    Hematuria    History of CVA (cerebrovascular accident)    CAD (coronary artery disease)    Malignant neoplasm of urinary bladder (HCC)    Acute systolic (congestive) heart failure (HCC)    Chronic kidney disease (CKD), stage IV (severe) (HCC)    Gross hematuria    Hypokalemia         Past Medical History     Past Medical History:   Diagnosis Date    Atrial fibrillation (HCC)     CAD (coronary artery disease)     Cancer (HCC)     bladder    [facility]bellar [facility] [address] ) 7/16/2017    CHF (congestive heart failure) (HCC)     Chronic kidney disease     COPD (chronic obstructive pulmonary disease) (HCC)     Hepatitis C     Hypertension     TIA (transient ischemic attack)          Surgical History     Past Surgical History:   Procedure Laterality Date    CAROTID ARTERY ANGIOPLASTY      COLONOSCOPY N/A 3/24/2017    Procedure: COLONOSCOPY;  Surgeon: Jacquelin Norman MD;  Location: [facility]; Service:     CORONARY ARTERY BYPASS GRAFT      CYSTECTOMY      ESOPHAGOGASTRODUODENOSCOPY N/A 3/23/2017    Procedure: ESOPHAGOGASTRODUODENOSCOPY (EGD); Surgeon: Jacquelin Norman MD;  Location: [facility];   Service:    [facility]in [facility] EYE SURGERY      ILEO CONDUIT           Family History     Family History   Problem Relation Age of Onset    Coronary artery disease Mother          Social History     Social History     History   Smoking Status    Former Smoker    Quit date: 2/28/1973   Smokeless Tobacco    Former User         Pertinent Lab Values     Lab Results   Component Value Date    CREATININE 2 16 (H) 09/01/2018       No results found for: PSA    @RESULTRCNT(1H])@      Pertinent Imaging     FINDINGS:     ABDOMEN     LOWER CHEST: Large left-sided pleural effusion  Mild atelectasis at the right lung base  Atelectasis at the left lung base  LIVER/BILIARY TREE:  Unremarkable      GALLBLADDER:  Multiple gallstones in the gallbladder      SPLEEN:  Scattered splenic granulomata      PANCREAS:  Unremarkable      ADRENAL GLANDS:  Unremarkable      KIDNEYS/URETERS:  Multiple bilateral renal cysts  Scattered calcifications which appear to be related to the cysts      STOMACH AND BOWEL:  No obvious hydronephrosis although difficult to evaluate in the presence of multiple cysts      APPENDIX:  No findings to suggest appendicitis      ABDOMINOPELVIC CAVITY:  No ascites or free intraperitoneal air  No lymphadenopathy      VESSELS:  Unremarkable for patient's age      PELVIS     REPRODUCTIVE ORGANS:  Penile implant and reservoir in place      URINARY BLADDER:  A cystectomy has been performed      ABDOMINAL WALL/INGUINAL REGIONS:  Ileal loop in the right lower quadrant      OSSEOUS STRUCTURES:  Degenerative changes in the lumbar spine  1st degree anterolisthesis of L4 with respect to L5      IMPRESSION:        1  Large left-sided pleural effusion  2   Bibasilar atelectasis  3   Cholelithiasis  4   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  5   Cystectomy and ileal loop  6  Penile implant and reservoir  Portions of the record may have been created with voice recognition software   Occasional wrong word or "sound a like" substitutions may have occurred due to the inherent limitations of voice recognition software   Read the chart carefully and recognize, using context, where substitutions have occurred

## 2019-02-14 ENCOUNTER — APPOINTMENT (OUTPATIENT)
Dept: LAB | Facility: HOSPITAL | Age: 84
End: 2019-02-14
Attending: INTERNAL MEDICINE
Payer: MEDICARE

## 2019-02-14 ENCOUNTER — OFFICE VISIT (OUTPATIENT)
Dept: NEPHROLOGY | Facility: CLINIC | Age: 84
End: 2019-02-14
Payer: MEDICARE

## 2019-02-14 VITALS
HEIGHT: 65 IN | BODY MASS INDEX: 23.26 KG/M2 | DIASTOLIC BLOOD PRESSURE: 60 MMHG | SYSTOLIC BLOOD PRESSURE: 90 MMHG | RESPIRATION RATE: 16 BRPM | TEMPERATURE: 98.7 F | WEIGHT: 139.6 LBS | HEART RATE: 76 BPM

## 2019-02-14 DIAGNOSIS — N18.4 CKD (CHRONIC KIDNEY DISEASE) STAGE 4, GFR 15-29 ML/MIN (HCC): ICD-10-CM

## 2019-02-14 DIAGNOSIS — I10 ESSENTIAL HYPERTENSION: Chronic | ICD-10-CM

## 2019-02-14 DIAGNOSIS — I50.22 CHRONIC SYSTOLIC CONGESTIVE HEART FAILURE (HCC): ICD-10-CM

## 2019-02-14 DIAGNOSIS — N18.4 CHRONIC KIDNEY DISEASE (CKD), STAGE IV (SEVERE) (HCC): Primary | ICD-10-CM

## 2019-02-14 DIAGNOSIS — C67.9 MALIGNANT NEOPLASM OF URINARY BLADDER, UNSPECIFIED SITE (HCC): ICD-10-CM

## 2019-02-14 DIAGNOSIS — N18.4 CHRONIC KIDNEY DISEASE (CKD), STAGE IV (SEVERE) (HCC): ICD-10-CM

## 2019-02-14 LAB
ANION GAP SERPL CALCULATED.3IONS-SCNC: 9 MMOL/L (ref 4–13)
BASOPHILS # BLD AUTO: 0.04 THOUSANDS/ΜL (ref 0–0.1)
BASOPHILS NFR BLD AUTO: 1 % (ref 0–1)
BUN SERPL-MCNC: 35 MG/DL (ref 5–25)
CALCIUM SERPL-MCNC: 9 MG/DL (ref 8.3–10.1)
CHLORIDE SERPL-SCNC: 101 MMOL/L (ref 100–108)
CO2 SERPL-SCNC: 28 MMOL/L (ref 21–32)
CREAT SERPL-MCNC: 2.56 MG/DL (ref 0.6–1.3)
CREAT UR-MCNC: 104 MG/DL
EOSINOPHIL # BLD AUTO: 0.05 THOUSAND/ΜL (ref 0–0.61)
EOSINOPHIL NFR BLD AUTO: 1 % (ref 0–6)
ERYTHROCYTE [DISTWIDTH] IN BLOOD BY AUTOMATED COUNT: 15.8 % (ref 11.6–15.1)
GFR SERPL CREATININE-BSD FRML MDRD: 21 ML/MIN/1.73SQ M
GLUCOSE P FAST SERPL-MCNC: 106 MG/DL (ref 65–99)
HCT VFR BLD AUTO: 41.8 % (ref 36.5–49.3)
HGB BLD-MCNC: 12.9 G/DL (ref 12–17)
IMM GRANULOCYTES # BLD AUTO: 0.02 THOUSAND/UL (ref 0–0.2)
IMM GRANULOCYTES NFR BLD AUTO: 0 % (ref 0–2)
LYMPHOCYTES # BLD AUTO: 1.32 THOUSANDS/ΜL (ref 0.6–4.47)
LYMPHOCYTES NFR BLD AUTO: 20 % (ref 14–44)
MCH RBC QN AUTO: 26.8 PG (ref 26.8–34.3)
MCHC RBC AUTO-ENTMCNC: 30.9 G/DL (ref 31.4–37.4)
MCV RBC AUTO: 87 FL (ref 82–98)
MONOCYTES # BLD AUTO: 0.88 THOUSAND/ΜL (ref 0.17–1.22)
MONOCYTES NFR BLD AUTO: 13 % (ref 4–12)
NEUTROPHILS # BLD AUTO: 4.26 THOUSANDS/ΜL (ref 1.85–7.62)
NEUTS SEG NFR BLD AUTO: 65 % (ref 43–75)
NRBC BLD AUTO-RTO: 0 /100 WBCS
PHOSPHATE SERPL-MCNC: 2.7 MG/DL (ref 2.3–4.1)
PLATELET # BLD AUTO: 158 THOUSANDS/UL (ref 149–390)
PMV BLD AUTO: 11.2 FL (ref 8.9–12.7)
POTASSIUM SERPL-SCNC: 4.7 MMOL/L (ref 3.5–5.3)
PROT UR-MCNC: 169 MG/DL
PROT/CREAT UR: 1.63 MG/G{CREAT} (ref 0–0.1)
PTH-INTACT SERPL-MCNC: 100.6 PG/ML (ref 18.4–80.1)
RBC # BLD AUTO: 4.82 MILLION/UL (ref 3.88–5.62)
SODIUM SERPL-SCNC: 138 MMOL/L (ref 136–145)
WBC # BLD AUTO: 6.57 THOUSAND/UL (ref 4.31–10.16)

## 2019-02-14 PROCEDURE — 84100 ASSAY OF PHOSPHORUS: CPT

## 2019-02-14 PROCEDURE — 36415 COLL VENOUS BLD VENIPUNCTURE: CPT

## 2019-02-14 PROCEDURE — 83970 ASSAY OF PARATHORMONE: CPT

## 2019-02-14 PROCEDURE — 82570 ASSAY OF URINE CREATININE: CPT

## 2019-02-14 PROCEDURE — 85025 COMPLETE CBC W/AUTO DIFF WBC: CPT

## 2019-02-14 PROCEDURE — 80048 BASIC METABOLIC PNL TOTAL CA: CPT

## 2019-02-14 PROCEDURE — 84156 ASSAY OF PROTEIN URINE: CPT

## 2019-02-14 PROCEDURE — 99214 OFFICE O/P EST MOD 30 MIN: CPT | Performed by: INTERNAL MEDICINE

## 2019-02-14 NOTE — ASSESSMENT & PLAN NOTE
I do not have recent blood work but chances are that his kidney function may be deteriorating and he may be uremic at this point  I discussed at length with him and his wife about possible need for dialysis  He does not want it  I had a long discussion with them about importance of dialysis but they want to keep him comfortable only  I supported disease in and will monitor though I will get blood test today to see how he is doing    I also discussed possible hospitalization if necessary they are not sure about that to

## 2019-02-14 NOTE — PATIENT INSTRUCTIONS
Chronic Kidney Disease   AMBULATORY CARE:   Chronic kidney disease (CKD)  is the gradual and permanent loss of kidney function  Normally, the kidneys remove fluid, chemicals, and waste from your blood  These wastes are turned into urine by your kidneys  CKD may worsen over time and lead to kidney failure  Common symptoms include the following:   · Changes in how often you need to urinate    · Swelling in your arms, legs, or feet    · Shortness of breath    · Fatigue or weakness    · Bad or bitter taste in your mouth    · Nausea, vomiting, or loss of appetite  Seek care immediately if:   · You are confused and very drowsy  · You have a seizure  · You have shortness of breath  Contact your healthcare provider if:   · You suddenly gain or lose more weight than your healthcare provider has told you is okay  · You have itchy skin or a rash  · You urinate more or less than you normally do  · You have blood in your urine  · You have nausea and repeated vomiting  · You have fatigue or muscle weakness  · You have hiccups that will not stop  · You have questions or concerns about your condition or care  Treatment for CKD:  Medicines may be given to decrease blood pressure and get rid of extra fluid  You may also receive medicine to manage health conditions that may occur with CKD  Dialysis is a treatment to remove chemicals and waste from your blood when your kidneys can no longer do this  Surgery may be needed to create an arteriovenous fistula (AVF) in your arm or insert a catheter into your abdomen so that you can receive dialysis  A kidney transplant may be done if your CKD becomes severe  Manage CKD:   · Maintain a healthy weight  Ask your healthcare provider how much you should weigh  Ask him to help you create a weight loss plan if you are overweight  · Exercise 30 to 60 minutes a day, 4 to 7 times a week, or as directed  Ask about the best exercise plan for you   Regular exercise can help you manage CKD, high blood pressure, and diabetes  · Follow your healthcare provider's advice about what to eat and drink  He may tell you to eat food low in sodium (salt), potassium, phosphorus, or protein  You may need to see a dietitian if you need help planning meals  Ask how much liquid to drink each day and which liquids are best for you  · Limit alcohol  Ask how much alcohol is safe for you to drink  A drink of alcohol is 12 ounces of beer, 5 ounces of wine, or 1½ ounces of liquor  · Do not smoke  Nicotine and other chemicals in cigarettes and cigars can cause lung and kidney damage  Ask your healthcare provider for information if you currently smoke and need help to quit  E-cigarettes or smokeless tobacco still contain nicotine  Talk to your healthcare provider before you use these products  · Ask your healthcare provider if you need vaccines  Infections such as pneumonia, influenza, and hepatitis can be more harmful or more likely to occur in a person who has CKD  Vaccines reduce your risk of infection with these viruses  Follow up with your healthcare provider as directed:  Write down your questions so you remember to ask them during your visits  © 2017 [employer] Information is for End User's use only and may not be sold, redistributed or otherwise used for commercial purposes  All illustrations and images included in CareNotes® are the copyrighted property of [employer]  or Pankaj Shepp[employer]  The above information is an  only  It is not intended as medical advice for individual conditions or treatments  Talk to your doctor, nurse or pharmacist before following any medical regimen to see if it is safe and effective for you

## 2019-02-14 NOTE — ASSESSMENT & PLAN NOTE
He is asymptomatic    I advised him to cut back diuretic in view of possible worsening renal failure as well as hypotension

## 2019-02-15 ENCOUNTER — TELEPHONE (OUTPATIENT)
Dept: NEPHROLOGY | Facility: CLINIC | Age: 84
End: 2019-02-15

## 2019-02-18 ENCOUNTER — TELEPHONE (OUTPATIENT)
Dept: NEPHROLOGY | Facility: CLINIC | Age: 84
End: 2019-02-18

## 2019-02-18 ENCOUNTER — HOSPITAL ENCOUNTER (INPATIENT)
Facility: HOSPITAL | Age: 84
LOS: 3 days | Discharge: HOME | DRG: 947 | End: 2019-02-21
Attending: EMERGENCY MEDICINE | Admitting: INTERNAL MEDICINE
Payer: MEDICARE

## 2019-02-18 ENCOUNTER — APPOINTMENT (INPATIENT)
Dept: CT IMAGING | Facility: HOSPITAL | Age: 84
DRG: 947 | End: 2019-02-18
Payer: MEDICARE

## 2019-02-18 ENCOUNTER — APPOINTMENT (EMERGENCY)
Dept: RADIOLOGY | Facility: HOSPITAL | Age: 84
DRG: 947 | End: 2019-02-18
Payer: MEDICARE

## 2019-02-18 DIAGNOSIS — I25.10 CORONARY ARTERY DISEASE INVOLVING NATIVE CORONARY ARTERY OF NATIVE HEART WITHOUT ANGINA PECTORIS: Chronic | ICD-10-CM

## 2019-02-18 DIAGNOSIS — Z86.79 HISTORY OF CHF (CONGESTIVE HEART FAILURE): ICD-10-CM

## 2019-02-18 DIAGNOSIS — I10 ESSENTIAL HYPERTENSION: Chronic | ICD-10-CM

## 2019-02-18 DIAGNOSIS — R77.8 ELEVATED TROPONIN: Chronic | ICD-10-CM

## 2019-02-18 DIAGNOSIS — E86.0 DEHYDRATION: ICD-10-CM

## 2019-02-18 DIAGNOSIS — R53.1 WEAKNESS: Primary | ICD-10-CM

## 2019-02-18 DIAGNOSIS — N17.9 AKI (ACUTE KIDNEY INJURY) (HCC): ICD-10-CM

## 2019-02-18 DIAGNOSIS — C67.9 MALIGNANT NEOPLASM OF URINARY BLADDER, UNSPECIFIED SITE (HCC): ICD-10-CM

## 2019-02-18 DIAGNOSIS — E43 PROTEIN-CALORIE MALNUTRITION, SEVERE (HCC): ICD-10-CM

## 2019-02-18 DIAGNOSIS — I48.0 PAROXYSMAL ATRIAL FIBRILLATION (HCC): ICD-10-CM

## 2019-02-18 DIAGNOSIS — R53.1 GENERALIZED WEAKNESS: ICD-10-CM

## 2019-02-18 DIAGNOSIS — N18.4 CKD (CHRONIC KIDNEY DISEASE) STAGE 4, GFR 15-29 ML/MIN (HCC): ICD-10-CM

## 2019-02-18 DIAGNOSIS — B35.6 TINEA CRURIS: ICD-10-CM

## 2019-02-18 LAB
ALBUMIN SERPL BCP-MCNC: 2.7 G/DL (ref 3.5–5)
ALP SERPL-CCNC: 95 U/L (ref 46–116)
ALT SERPL W P-5'-P-CCNC: 9 U/L (ref 12–78)
ANION GAP SERPL CALCULATED.3IONS-SCNC: 9 MMOL/L (ref 4–13)
AST SERPL W P-5'-P-CCNC: 24 U/L (ref 5–45)
BACTERIA UR QL AUTO: ABNORMAL /HPF
BASOPHILS # BLD AUTO: 0.05 THOUSANDS/ΜL (ref 0–0.1)
BASOPHILS NFR BLD AUTO: 1 % (ref 0–1)
BILIRUB DIRECT SERPL-MCNC: 0.32 MG/DL (ref 0–0.2)
BILIRUB SERPL-MCNC: 0.6 MG/DL (ref 0.2–1)
BILIRUB UR QL STRIP: NEGATIVE
BUN SERPL-MCNC: 33 MG/DL (ref 5–25)
CALCIUM SERPL-MCNC: 9 MG/DL (ref 8.3–10.1)
CHLORIDE SERPL-SCNC: 103 MMOL/L (ref 100–108)
CLARITY UR: ABNORMAL
CO2 SERPL-SCNC: 26 MMOL/L (ref 21–32)
COLOR UR: YELLOW
CREAT SERPL-MCNC: 2.43 MG/DL (ref 0.6–1.3)
EOSINOPHIL # BLD AUTO: 0.05 THOUSAND/ΜL (ref 0–0.61)
EOSINOPHIL NFR BLD AUTO: 1 % (ref 0–6)
ERYTHROCYTE [DISTWIDTH] IN BLOOD BY AUTOMATED COUNT: 15.9 % (ref 11.6–15.1)
GFR SERPL CREATININE-BSD FRML MDRD: 23 ML/MIN/1.73SQ M
GLUCOSE SERPL-MCNC: 125 MG/DL (ref 65–140)
GLUCOSE UR STRIP-MCNC: NEGATIVE MG/DL
HCT VFR BLD AUTO: 40.2 % (ref 36.5–49.3)
HGB BLD-MCNC: 12.3 G/DL (ref 12–17)
HGB UR QL STRIP.AUTO: ABNORMAL
IMM GRANULOCYTES # BLD AUTO: 0.02 THOUSAND/UL (ref 0–0.2)
IMM GRANULOCYTES NFR BLD AUTO: 0 % (ref 0–2)
INR PPP: 1.26 (ref 0.86–1.17)
KETONES UR STRIP-MCNC: NEGATIVE MG/DL
LEUKOCYTE ESTERASE UR QL STRIP: ABNORMAL
LYMPHOCYTES # BLD AUTO: 1.42 THOUSANDS/ΜL (ref 0.6–4.47)
LYMPHOCYTES NFR BLD AUTO: 23 % (ref 14–44)
MCH RBC QN AUTO: 26.6 PG (ref 26.8–34.3)
MCHC RBC AUTO-ENTMCNC: 30.6 G/DL (ref 31.4–37.4)
MCV RBC AUTO: 87 FL (ref 82–98)
MONOCYTES # BLD AUTO: 0.72 THOUSAND/ΜL (ref 0.17–1.22)
MONOCYTES NFR BLD AUTO: 12 % (ref 4–12)
NEUTROPHILS # BLD AUTO: 3.96 THOUSANDS/ΜL (ref 1.85–7.62)
NEUTS SEG NFR BLD AUTO: 63 % (ref 43–75)
NITRITE UR QL STRIP: NEGATIVE
NON-SQ EPI CELLS URNS QL MICRO: ABNORMAL /HPF
NRBC BLD AUTO-RTO: 0 /100 WBCS
PH UR STRIP.AUTO: 7 [PH] (ref 4.5–8)
PLATELET # BLD AUTO: 151 THOUSANDS/UL (ref 149–390)
PMV BLD AUTO: 10.8 FL (ref 8.9–12.7)
POTASSIUM SERPL-SCNC: 4.4 MMOL/L (ref 3.5–5.3)
PROT SERPL-MCNC: 8.3 G/DL (ref 6.4–8.2)
PROT UR STRIP-MCNC: ABNORMAL MG/DL
PROTHROMBIN TIME: 15.6 SECONDS (ref 11.8–14.2)
RBC # BLD AUTO: 4.63 MILLION/UL (ref 3.88–5.62)
RBC #/AREA URNS AUTO: ABNORMAL /HPF
SODIUM SERPL-SCNC: 138 MMOL/L (ref 136–145)
SP GR UR STRIP.AUTO: 1.01 (ref 1–1.03)
TROPONIN I SERPL-MCNC: 0.38 NG/ML
TROPONIN I SERPL-MCNC: 0.44 NG/ML
TSH SERPL DL<=0.05 MIU/L-ACNC: 1.03 UIU/ML (ref 0.36–3.74)
UROBILINOGEN UR QL STRIP.AUTO: 0.2 E.U./DL
WBC # BLD AUTO: 6.22 THOUSAND/UL (ref 4.31–10.16)
WBC #/AREA URNS AUTO: ABNORMAL /HPF

## 2019-02-18 PROCEDURE — 85610 PROTHROMBIN TIME: CPT | Performed by: EMERGENCY MEDICINE

## 2019-02-18 PROCEDURE — 84484 ASSAY OF TROPONIN QUANT: CPT | Performed by: STUDENT IN AN ORGANIZED HEALTH CARE EDUCATION/TRAINING PROGRAM

## 2019-02-18 PROCEDURE — 99223 1ST HOSP IP/OBS HIGH 75: CPT | Performed by: STUDENT IN AN ORGANIZED HEALTH CARE EDUCATION/TRAINING PROGRAM

## 2019-02-18 PROCEDURE — 84443 ASSAY THYROID STIM HORMONE: CPT | Performed by: STUDENT IN AN ORGANIZED HEALTH CARE EDUCATION/TRAINING PROGRAM

## 2019-02-18 PROCEDURE — 81001 URINALYSIS AUTO W/SCOPE: CPT | Performed by: EMERGENCY MEDICINE

## 2019-02-18 PROCEDURE — 80048 BASIC METABOLIC PNL TOTAL CA: CPT | Performed by: EMERGENCY MEDICINE

## 2019-02-18 PROCEDURE — 93005 ELECTROCARDIOGRAM TRACING: CPT

## 2019-02-18 PROCEDURE — 36415 COLL VENOUS BLD VENIPUNCTURE: CPT | Performed by: EMERGENCY MEDICINE

## 2019-02-18 PROCEDURE — 87086 URINE CULTURE/COLONY COUNT: CPT | Performed by: EMERGENCY MEDICINE

## 2019-02-18 PROCEDURE — 85025 COMPLETE CBC W/AUTO DIFF WBC: CPT | Performed by: EMERGENCY MEDICINE

## 2019-02-18 PROCEDURE — 72125 CT NECK SPINE W/O DYE: CPT

## 2019-02-18 PROCEDURE — 84484 ASSAY OF TROPONIN QUANT: CPT | Performed by: EMERGENCY MEDICINE

## 2019-02-18 PROCEDURE — 84146 ASSAY OF PROLACTIN: CPT | Performed by: STUDENT IN AN ORGANIZED HEALTH CARE EDUCATION/TRAINING PROGRAM

## 2019-02-18 PROCEDURE — 99285 EMERGENCY DEPT VISIT HI MDM: CPT

## 2019-02-18 PROCEDURE — 70450 CT HEAD/BRAIN W/O DYE: CPT

## 2019-02-18 PROCEDURE — 87186 SC STD MICRODIL/AGAR DIL: CPT | Performed by: EMERGENCY MEDICINE

## 2019-02-18 PROCEDURE — 80076 HEPATIC FUNCTION PANEL: CPT | Performed by: EMERGENCY MEDICINE

## 2019-02-18 PROCEDURE — 71046 X-RAY EXAM CHEST 2 VIEWS: CPT

## 2019-02-18 PROCEDURE — 87631 RESP VIRUS 3-5 TARGETS: CPT | Performed by: INTERNAL MEDICINE

## 2019-02-18 PROCEDURE — 99223 1ST HOSP IP/OBS HIGH 75: CPT | Performed by: INTERNAL MEDICINE

## 2019-02-18 PROCEDURE — 87077 CULTURE AEROBIC IDENTIFY: CPT | Performed by: EMERGENCY MEDICINE

## 2019-02-18 RX ORDER — FUROSEMIDE 10 MG/ML
40 INJECTION INTRAMUSCULAR; INTRAVENOUS EVERY 12 HOURS
Status: DISCONTINUED | OUTPATIENT
Start: 2019-02-18 | End: 2019-02-21

## 2019-02-18 RX ORDER — ASPIRIN 81 MG/1
81 TABLET ORAL DAILY
Status: DISCONTINUED | OUTPATIENT
Start: 2019-02-19 | End: 2019-02-21 | Stop reason: HOSPADM

## 2019-02-18 RX ORDER — HEPARIN SODIUM 5000 [USP'U]/ML
5000 INJECTION, SOLUTION INTRAVENOUS; SUBCUTANEOUS EVERY 12 HOURS SCHEDULED
Status: DISCONTINUED | OUTPATIENT
Start: 2019-02-18 | End: 2019-02-21 | Stop reason: HOSPADM

## 2019-02-18 RX ORDER — CARVEDILOL 6.25 MG/1
6.25 TABLET ORAL 2 TIMES DAILY WITH MEALS
Status: DISCONTINUED | OUTPATIENT
Start: 2019-02-18 | End: 2019-02-21

## 2019-02-18 RX ADMIN — SODIUM CHLORIDE 500 ML: 0.9 INJECTION, SOLUTION INTRAVENOUS at 13:40

## 2019-02-18 RX ADMIN — HEPARIN SODIUM 5000 UNITS: 5000 INJECTION, SOLUTION INTRAVENOUS; SUBCUTANEOUS at 23:52

## 2019-02-18 RX ADMIN — CARVEDILOL 6.25 MG: 6.25 TABLET, FILM COATED ORAL at 17:44

## 2019-02-18 RX ADMIN — FUROSEMIDE 40 MG: 10 INJECTION, SOLUTION INTRAMUSCULAR; INTRAVENOUS at 17:44

## 2019-02-18 NOTE — H&P
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, ileal conduit, presented with complaining of generalized weakness  As per wife patient has been gradually deteriorating  On my encounter patient is not in acute distress  Frail,but nontoxic appearing  Awake, alert, oriented to person only  O2 saturation 97% on room air  Denies any complaints,Other than feeling cold  Denies chest pain, dyspnea, cough, abdominal pain, diarrhea  No significant new abnormalities noted  Ileal conduit bag noted with clear urine  No fever, no leukocytosis noted  Chest x-ray report noted for left lower lobe effusion, opacity  Likely viral etiology vs worsening of dementia   Follow-up respiratory pathogen  IV hydration  Monitor off antibiotics since no signs of active bacterial infection present  Follow-up procalcitonin  Thyroid studies  Continue supportive care  PTOT eval  Palliative care consult      Chronic kidney disease (CKD), stage IV (severe) ([facility])  Assessment & Plan  History of CKD stage 4  Patient follows up with Dr Adriano Cleary outpatient  Baseline creatinine around 2 3 To 2 5 range  Currently appears to be at baseline  Stable    CAD (coronary artery disease)  Assessment & Plan  History of CAD status post CABG  Denies any new complaints  Not in acute distress  Continue aspirin and beta-blocker  Patient follows up with Dr Mary Kevin outpatient    Atrial fibrillation [facility])  Assessment & Plan  History of AFib on beta-blocker    Currently not on any anticoagulation due to hematuria in the past     History of CHF (congestive heart failure)  Assessment & Plan  History of CHF  Echo in August 2018 shows EF of 40-45%  Currently on Lasix 40 mg b i d  Wife reports that patient has not been taking diuretics for last couple days  Chest x-ray noted for increased vascular congestion, left effusion  Does not appear to be in acute exacerbation  No signs or symptoms of acute respiratory distress  Will give IV Lasix 40 b i d  While inpatient  Low-salt diet  Continue I&O monitoring    Elevated troponin  Assessment & Plan  Noted 0 44  EKG shows Afib with artifacts  Patient currently denies chest pain, dyspnea, nausea, diaphoreses  Denies any complaints  Likely non ischemic etiology in the settings of CKD stage 4  Trend troponin  Continue supportive care              VTE Prophylaxis: Heparin    Code Status:  Level 3 DNR DNI  POLST: POLST form is not discussed and not completed at this time  Discussion with family:  Wife over the phone  Answered all questions appropriate  Anticipated Length of Stay:  Patient will be admitted on an Inpatient basis with an anticipated length of stay of   2 midnights  Justification for Hospital Stay:  Weakness    Total Time for Visit, including Counseling / Coordination of Care: 1 hour  Greater than 50% of this total time spent on direct patient counseling and coordination of care  Chief Complaint:   Weakness    History of Present Illness:    Annette Barber is a 80 y o  male past medical history of dementia, hypertension, CKD stage 4, AFib not on anticoagulation, hard of hearing, CAD status post CABG, CHF, polycystic kidney disease 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, ileal conduit,who presents with worsening weakness  Patient is a poor historian  History obtained from the chart and wife over the phone  Wife reports that for past couple weeks patient has gradually deteriorating  He has been getting weak do able to support himself    Patient also reports that he had multiple falls in the past denies any fall recently  On my encounter patient is awake, alert, oriented to person only  Only complaint he has is feeling cold  Denies chest pain, cough, nausea, vomiting, abdominal pain, dysuria, diarrhea  Denies any other complaints  Lives with wife  Ambulates using a cane  Wife states that based on their interactions and based on Mu-ism belief, patient would not want to be resuscitated and had expressed his wishes to her to be DNR DNI level 3  No other events reported  Review of Systems:    Review of Systems    Past Medical and Surgical History:     Past Medical History:   Diagnosis Date    At[facility] fibrillation [facility] [address] )     CAD (coronary artery disease)     Cancer ([facility] [address] )     bladder    [facility], [facility] 7/16/2017    CHF (congestive heart failure) (HCC)     Chronic kidney disease     COPD (chronic obstructive pulmonary disease) (HCC)     Hepatitis C     Hypertension     TIA (transient ischemic attack)        Past Surgical History:   Procedure Laterality Date    CAROTID ARTERY ANGIOPLASTY      COLONOSCOPY N/A 3/24/2017    Procedure: COLONOSCOPY;  Surgeon: Gus Wilkerson MD;  Location: [facility]; Service:     CORONARY ARTERY BYPASS GRAFT      CYSTECTOMY      ESOPHAGOGASTRODUODENOSCOPY N/A 3/23/2017    Procedure: ESOPHAGOGASTRODUODENOSCOPY (EGD); Surgeon: Gus Wilkerson MD;  Location: [facility]; Service:    [facility] EYE SURGERY      ILEO CONDUIT         Meds/Allergies:    Prior to Admission medications    Medication Sig Start Date End Date Taking?  Authorizing Provider   aspirin (ECOTRIN LOW STRENGTH) 81 mg EC tablet Take 81 mg by mouth daily   Yes Historical Provider, MD   carvedilol (COREG) 6 25 mg tablet Take 6 25 mg by mouth 2 (two) times a day with meals   Yes Historical Provider, MD   furosemide (LASIX) 40 mg tablet Take 1 tablet (40 mg total) by mouth 2 (two) times a day  Patient taking differently: Take 40 mg by mouth daily   8/26/18  Yes [facility] Staff, MARY   potassium chloride (K-DUR,KLOR-CON) 20 mEq tablet Take 1 tablet (20 mEq total) by mouth 2 (two) times a day 18  Yes [facility] Staff, MARY   ALPRAZolam Dorthey Halim) 0 5 mg tablet Take 0 5 mg by mouth daily as needed 18   Historical Provider, MD RAJAN have reviewed home medications with patient family member  Allergies: No Known Allergies    Social History:     Marital Status: /Civil Union   Patient Pre-hospital Living Situation:  With wife  Patient Pre-hospital Level of Mobility:  With Cane  Patient Pre-hospital Diet Restrictions:  None reported  Substance Use History:   Social History     Substance and Sexual Activity   Alcohol Use No    Frequency: Never    Drinks per session: Patient refused    Binge frequency: Never     Social History     Tobacco Use   Smoking Status Former Smoker    Packs/day: 1 00    Years: 5     Pack years: 5     Types: Cigarettes    Last attempt to quit: 1973    Years since quittin 0   Smokeless Tobacco Former User     Social History     Substance and Sexual Activity   Drug Use No       Family History:    non-contributory    Physical Exam:     Vitals:   Blood Pressure: 141/94 (19 1532)  Pulse: 104 (19 1532)  Temperature: 98 1 °F (36 7 °C) (19 1532)  Temp Source: Oral (19 1532)  Respirations: 18 (19 1532)  Height: 5' 5" (165 1 cm) (19 1527)  Weight - Scale: 63 9 kg (140 lb 14 oz) (19 1527)  SpO2: 97 % (19 1532)    Physical Exam   Constitutional: No distress  Frail, nontoxic appearing  Eyes: Pupils are equal, round, and reactive to light  EOM are normal    Neck: Normal range of motion  Neck supple  No JVD present  Cardiovascular: Normal rate  Monitor shows AFib with rate controlled   Pulmonary/Chest: Effort normal and breath sounds normal  No stridor  No respiratory distress  He has no wheezes  Abdominal: Soft  Bowel sounds are normal  He exhibits no distension  There is no tenderness     Musculoskeletal: Normal range of motion  Neurological: He is alert  Oriented to person only  Cooperative during exam         Additional Data:     Lab Results: I have personally reviewed pertinent reports  Results from last 7 days   Lab Units 02/18/19  1220   WBC Thousand/uL 6 22   HEMOGLOBIN g/dL 12 3   HEMATOCRIT % 40 2   PLATELETS Thousands/uL 151   NEUTROS PCT % 63   LYMPHS PCT % 23   MONOS PCT % 12   EOS PCT % 1     Results from last 7 days   Lab Units 02/18/19  1220   SODIUM mmol/L 138   POTASSIUM mmol/L 4 4   CHLORIDE mmol/L 103   CO2 mmol/L 26   BUN mg/dL 33*   CREATININE mg/dL 2 43*   ANION GAP mmol/L 9   CALCIUM mg/dL 9 0   ALBUMIN g/dL 2 7*   TOTAL BILIRUBIN mg/dL 0 60   ALK PHOS U/L 95   ALT U/L 9*   AST U/L 24   GLUCOSE RANDOM mg/dL 125     Results from last 7 days   Lab Units 02/18/19  1220   INR  1 26*                   Imaging: I have personally reviewed pertinent reports  XR chest pa & lateral   Final Result by Flaca Ruelas MD (02/18 1048)      Moderate left pleural effusion and associated left lung base opacity  Workstation performed: [id number]             EKG, Pathology, and Other Studies Reviewed on Admission:   · EKG:  AFib with artifacts  Allscripts / Epic Records Reviewed: Yes     ** Please Note: This note has been constructed using a voice recognition system   **

## 2019-02-18 NOTE — ASSESSMENT & PLAN NOTE
History of AFib on beta-blocker    Currently not on any anticoagulation due to hematuria in the past

## 2019-02-18 NOTE — TELEPHONE ENCOUNTER
Upon chart review and adding patient to care management spreadsheet I noticed patient has not been well since 2 14 19 and Dr Yovanny Andrade was concerned about getting patient dialysis vs  ACM vs  Hospice vs  Hospital   Wife and patient only want him to be comfortable at this point  Patient has recent labs that show abnormal urine (possible UTI)  --during the chart review I also noticed Indy,  put a note to Dr Yovanny Andrade today with worsening condition  I called Inyd and told her to call or [place] Dr Yovanny Andrade with this information so patient can be cared for immediately      Indy will contact Dr Yovanny Andrade

## 2019-02-18 NOTE — TELEPHONE ENCOUNTER
I discussed with the patient wife    Advised her to take patient to the hospital for further management and she will do that

## 2019-02-18 NOTE — TELEPHONE ENCOUNTER
Feliz Jackson wife called and said she would like to speak with Dr Yolette Stacy regarding the Lab Results  Mary Mims did see Dr Yovanny Andrade on 2/14/19 but Tricia Hoffman said he is not doing very well  Kp sleeps all the time, fell down a few times, not eating and not consuming enough water  Tricia Hoffman does not know what to do

## 2019-02-18 NOTE — PLAN OF CARE
Problem: Potential for Falls  Goal: Patient will remain free of falls  Description  INTERVENTIONS:  - Assess patient frequently for physical needs  -  Identify cognitive and physical deficits and behaviors that affect risk of falls    -  [place] fall precautions as indicated by assessment   - Educate patient/family on patient safety including physical limitations  - Instruct patient to call for assistance with activity based on assessment  - Modify environment to reduce risk of injury  - Consider OT/PT consult to assist with strengthening/mobility  Outcome: Progressing     Problem: PAIN - ADULT  Goal: Verbalizes/displays adequate comfort level or baseline comfort level  Description  Interventions:  - Encourage patient to monitor pain and request assistance  - Assess pain using appropriate pain scale  - Administer analgesics based on type and severity of pain and evaluate response  - Implement non-pharmacological measures as appropriate and evaluate response  - Consider cultural and social influences on pain and pain management  - Notify physician/advanced practitioner if interventions unsuccessful or patient reports new pain  Outcome: Progressing     Problem: INFECTION - ADULT  Goal: Absence or prevention of progression during hospitalization  Description  INTERVENTIONS:  - Assess and monitor for signs and symptoms of infection  - Monitor lab/diagnostic results  - Monitor all insertion sites, i e  indwelling lines, tubes, and drains  - Monitor endotracheal (as able) and nasal secretions for changes in amount and color  - [place] appropriate cooling/warming therapies per order  - Administer medications as ordered  - Instruct and encourage patient and family to use good hand hygiene technique  - Identify and instruct in appropriate isolation precautions for identified infection/condition  Outcome: Progressing  Goal: Absence of fever/infection during neutropenic period  Description  INTERVENTIONS:  - Monitor WBC  - Implement neutropenic guidelines  Outcome: Progressing     Problem: SAFETY ADULT  Goal: Maintain or return to baseline ADL function  Description  INTERVENTIONS:  -  Assess patient's ability to carry out ADLs; assess patient's baseline for ADL function and identify physical deficits which impact ability to perform ADLs (bathing, care of mouth/teeth, toileting, grooming, dressing, etc )  - Assess/evaluate cause of self-care deficits   - Assess range of motion  - Assess patient's mobility; develop plan if impaired  - Assess patient's need for assistive devices and provide as appropriate  - Encourage maximum independence but intervene and supervise when necessary  ¯ Involve family in performance of ADLs  ¯ Assess for home care needs following discharge   ¯ Request OT consult to assist with ADL evaluation and planning for discharge  ¯ Provide patient education as appropriate  Outcome: Progressing  Goal: Maintain or return mobility status to optimal level  Description  INTERVENTIONS:  - Assess patient's baseline mobility status (ambulation, transfers, stairs, etc )    - Identify cognitive and physical deficits and behaviors that affect mobility  - Identify mobility aids required to assist with transfers and/or ambulation (gait belt, sit-to-stand, lift, walker, cane, etc )  - [place] fall precautions as indicated by assessment  - Record patient progress and toleration of activity level on Mobility SBAR; progress patient to next Phase/Stage  - Instruct patient to call for assistance with activity based on assessment  - Request Rehabilitation consult to assist with strengthening/weightbearing, etc   Outcome: Progressing     Problem: DISCHARGE PLANNING  Goal: Discharge to home or other facility with appropriate resources  Description  INTERVENTIONS:  - Identify barriers to discharge w/patient and caregiver  - Arrange for needed discharge resources and transportation as appropriate  - Identify discharge learning needs (meds, wound care, etc )  - Arrange for interpretive services to assist at discharge as needed  - Refer to Case Management Department for coordinating discharge planning if the patient needs post-hospital services based on physician/advanced practitioner order or complex needs related to functional status, cognitive ability, or social support system  Outcome: Progressing     Problem: Knowledge Deficit  Goal: Patient/family/caregiver demonstrates understanding of disease process, treatment plan, medications, and discharge instructions  Description  Complete learning assessment and assess knowledge base    Interventions:  - Provide teaching at level of understanding  - Provide teaching via preferred learning methods  Outcome: Progressing

## 2019-02-18 NOTE — ASSESSMENT & PLAN NOTE
History of CHF  Echo in August 2018 shows EF of 40-45%  Currently on Lasix 40 mg b i d  Wife reports that patient has not been taking diuretics for last couple days  Chest x-ray noted for increased vascular congestion, left effusion  Does not appear to be in acute exacerbation  No signs or symptoms of acute respiratory distress  Will give IV Lasix 40 b i d   While inpatient  Low-salt diet  Continue I&O monitoring

## 2019-02-18 NOTE — ASSESSMENT & PLAN NOTE
History of CKD stage 4  Patient follows up with Dr Rea Roberson outpatient  Baseline creatinine around 2 3 To 2 5 range    Currently appears to be at baseline  Stable

## 2019-02-18 NOTE — ED PROVIDER NOTES
History  Chief Complaint   Patient presents with    Weakness - Generalized     pt states that he feels weak and hasnt been able to get out of bed this past week in the morning     HPI patient is an 72-year-old male, patient reports he feels very weak  According to the patient's wife he has not been eating and unable to get out of bed over the last week  She reports decreased appetite and in general malaise  Patient reports he just does not feel well and can't do anything  Patient's wife reports she spoke with her nephrologist today and was advised to come to the emergency department due to the diffuse weakness  Patient has a history of renal insufficiency, has a draining ileostomy  Patient has been offered dialysis in the past but does not really want to be on dialysis  Nephrology felt the patient might benefit from palliative care  At this point his wife reports she has unable to take care of him at home  Past medical history kidney disease AFib, CHF, COPD, coronary artery disease  Family history noncontributory  Social history, here with his wife, nonsmoker    Prior to Admission Medications   Prescriptions Last Dose Informant Patient Reported? Taking?    ALPRAZolam (XANAX) 0 5 mg tablet Unknown at Unknown time Self Yes No   Sig: Take 0 5 mg by mouth daily as needed   aspirin (ECOTRIN LOW STRENGTH) 81 mg EC tablet 2/18/2019 at Unknown time Self Yes Yes   Sig: Take 81 mg by mouth daily   carvedilol (COREG) 6 25 mg tablet 2/18/2019 at Unknown time Self Yes Yes   Sig: Take 6 25 mg by mouth 2 (two) times a day with meals   furosemide (LASIX) 40 mg tablet Past Week at Unknown time Self No Yes   Sig: Take 1 tablet (40 mg total) by mouth 2 (two) times a day   Patient taking differently: Take 40 mg by mouth daily     potassium chloride (K-DUR,KLOR-CON) 20 mEq tablet Past Week at Unknown time Self No Yes   Sig: Take 1 tablet (20 mEq total) by mouth 2 (two) times a day      Facility-Administered Medications: None Past Medical History:   Diagnosis Date    [facility]     CAD (coronary artery disease)     Cancer (HCC)     bladder    Cerebellar stroke, acute ([facility] ) 2017    CHF (congestive heart failure) (HCC)     Chronic kidney disease     COPD (chronic obstructive pulmonary disease) (HCC)     Hepatitis C     Hypertension     TIA (transient ischemic attack)        Past Surgical History:   Procedure Laterality Date    CAROTID ARTERY ANGIOPLASTY      COLONOSCOPY N/A 3/24/2017    Procedure: COLONOSCOPY;  Surgeon: Mireille López MD;  Location: [facility]; Service:     CORONARY ARTERY BYPASS GRAFT      CYSTECTOMY      ESOPHAGOGASTRODUODENOSCOPY N/A 3/23/2017    Procedure: ESOPHAGOGASTRODUODENOSCOPY (EGD); Surgeon: Mireille López MD;  Location: [facility]; Service:     EYE SURGERY      ILEO CONDUIT         Family History   Problem Relation Age of Onset    Coronary artery disease Mother     Lung disease Father     Heart disease Brother      I have reviewed and agree with the history as documented  Social History     Tobacco Use    Smoking status: Former Smoker     Packs/day: 1 00     Years: 5 00     Pack years: 5 00     Types: Cigarettes     Last attempt to quit: 1973     Years since quittin 0    Smokeless tobacco: Former User   Substance Use Topics    Alcohol use: Not Currently    Drug use: No        Review of Systems   Constitutional: Positive for fatigue  Negative for diaphoresis and fever  HENT: Negative for congestion, ear pain, nosebleeds and sore throat  Eyes: Negative for photophobia, pain, discharge and visual disturbance  Respiratory: Negative for cough, choking, chest tightness, shortness of breath and wheezing  Cardiovascular: Negative for chest pain and palpitations  Gastrointestinal: Negative for abdominal distention, abdominal pain, diarrhea and vomiting  Genitourinary: Negative for dysuria, flank pain and frequency     Musculoskeletal: Negative for back pain, gait problem and joint swelling  Skin: Negative for color change and rash  Neurological: Positive for weakness  Negative for dizziness, syncope and headaches  Psychiatric/Behavioral: Negative for behavioral problems and confusion  The patient is not nervous/anxious  All other systems reviewed and are negative  Physical Exam  Physical Exam   Constitutional: He is oriented to person, place, and time  He appears well-developed and well-nourished  HENT:   Head: Normocephalic  Right Ear: External ear normal    Left Ear: External ear normal    Nose: Nose normal    Dry mucous membranes, poor skin turgor   Eyes: Pupils are equal, round, and reactive to light  EOM and lids are normal    Neck: Normal range of motion  Neck supple  Cardiovascular: Normal heart sounds  Pulmonary/Chest: Effort normal and breath sounds normal  No respiratory distress  Abdominal: Soft  Bowel sounds are normal    Ileostomy in the right lower quadrant   Musculoskeletal: Normal range of motion  He exhibits no deformity  Neurological: He is alert and oriented to person, place, and time  Skin: Skin is warm and dry  Psychiatric: He has a normal mood and affect  Nursing note and vitals reviewed     Pulse oximetry 99 percent on room air adequate oxygenation, there is no hypoxia    Vital Signs  ED Triage Vitals   Temperature Pulse Respirations Blood Pressure SpO2   02/18/19 1205 02/18/19 1205 02/18/19 1205 02/18/19 1205 02/18/19 1205   98 1 °F (36 7 °C) 69 16 140/78 99 %      Temp Source Heart Rate Source Patient Position - Orthostatic VS BP Location FiO2 (%)   02/18/19 1205 02/18/19 1205 02/18/19 1205 02/18/19 1205 --   Oral Monitor Lying Right arm       Pain Score       02/18/19 1532       No Pain           Vitals:    02/18/19 1205 02/18/19 1345 02/18/19 1500 02/18/19 1532   BP: 140/78 145/83 168/92 141/94   Pulse: 69 67 86 104   Patient Position - Orthostatic VS: Lying Lying Lying Lying       Visual Acuity      ED Medications  Medications   aspirin (ECOTRIN LOW STRENGTH) EC tablet 81 mg (has no administration in time range)   carvedilol (COREG) tablet 6 25 mg (6 25 mg Oral Given 2/18/19 1744)   heparin (porcine) subcutaneous injection 5,000 Units (has no administration in time range)   furosemide (LASIX) injection 40 mg (40 mg Intravenous Given 2/18/19 1744)   sodium chloride 0 9 % bolus 500 mL (500 mL Intravenous New Bag 2/18/19 1340)       Diagnostic Studies  Results Reviewed     Procedure Component Value Units Date/Time    UA w Reflex to Microscopic w Reflex to Culture [[id number]]  (Abnormal) Collected:  02/18/19 1708    Lab Status:  Final result Specimen:  Urine, Indwelling Beckwith Catheter Updated:  02/18/19 1720     Color, UA Yellow     Clarity, UA Cloudy     Specific Gravity, UA 1 015     pH, UA 7 0     Leukocytes, UA Large     Nitrite, UA Negative     Protein,  (2+) mg/dl      Glucose, UA Negative mg/dl      Ketones, UA Negative mg/dl      Urobilinogen, UA 0 2 E U /dl      Bilirubin, UA Negative     Blood, UA Trace-Intact    Troponin I [[id number]]  (Abnormal) Collected:  02/18/19 1220    Lab Status:  Final result Specimen:  Blood from Arm, Right Updated:  02/18/19 1253     Troponin I 0 44 ng/mL     Basic metabolic panel [[id number]]  (Abnormal) Collected:  02/18/19 1220    Lab Status:  Final result Specimen:  Blood from Arm, Right Updated:  02/18/19 1244     Sodium 138 mmol/L      Potassium 4 4 mmol/L      Chloride 103 mmol/L      CO2 26 mmol/L      ANION GAP 9 mmol/L      BUN 33 mg/dL      Creatinine 2 43 mg/dL      Glucose 125 mg/dL      Calcium 9 0 mg/dL      eGFR 23 ml/min/1 73sq m     Narrative:       National Kidney Disease Education Program recommendations are as follows:  GFR calculation is accurate only with a steady state creatinine  Chronic Kidney disease less than 60 ml/min/1 73 sq  meters  Kidney failure less than 15 ml/min/1 73 sq  meters      Hepatic function panel [[id number]] (Abnormal) Collected:  02/18/19 1220    Lab Status:  Final result Specimen:  Blood from Arm, Right Updated:  02/18/19 1244     Total Bilirubin 0 60 mg/dL      Bilirubin, Direct 0 32 mg/dL      Alkaline Phosphatase 95 U/L      AST 24 U/L      ALT 9 U/L      Total Protein 8 3 g/dL      Albumin 2 7 g/dL     Protime-INR [[id number]]  (Abnormal) Collected:  02/18/19 1220    Lab Status:  Final result Specimen:  Blood from Arm, Right Updated:  02/18/19 1242     Protime 15 6 seconds      INR 1 26    CBC and differential [[id number]]  (Abnormal) Collected:  02/18/19 1220    Lab Status:  Final result Specimen:  Blood from Arm, Right Updated:  02/18/19 1228     WBC 6 22 Thousand/uL      RBC 4 63 Million/uL      Hemoglobin 12 3 g/dL      Hematocrit 40 2 %      MCV 87 fL      MCH 26 6 pg      MCHC 30 6 g/dL      RDW 15 9 %      MPV 10 8 fL      Platelets 020 Thousands/uL      nRBC 0 /100 WBCs      Neutrophils Relative 63 %      Immat GRANS % 0 %      Lymphocytes Relative 23 %      Monocytes Relative 12 %      Eosinophils Relative 1 %      Basophils Relative 1 %      Neutrophils Absolute 3 96 Thousands/µL      Immature Grans Absolute 0 02 Thousand/uL      Lymphocytes Absolute 1 42 Thousands/µL      Monocytes Absolute 0 72 Thousand/µL      Eosinophils Absolute 0 05 Thousand/µL      Basophils Absolute 0 05 Thousands/µL                  XR chest pa & lateral   Final Result by Vinay Singleton MD (02/18 8567)      Moderate left pleural effusion and associated left lung base opacity              Workstation performed: [id number]         CT head wo contrast    (Results Pending)   CT spine cervical wo contrast    (Results Pending)              Procedures  ECG 12 Lead Documentation  Date/Time: 2/18/2019 2:58 PM  Performed by: Herson Duran MD  Authorized by: Herson Duran MD     Indications / Diagnosis:  Weakness  ECG reviewed by me, the ED Provider: yes    Patient location:  ED  Previous ECG:     Previous ECG:  Compared to current Comparison ECG info:  August 31, 2018    Similarity:  No change  Interpretation:     Interpretation: non-specific    Rate:     ECG rate:  Sixty-seven    ECG rate assessment: normal    Rhythm:     Rhythm: atrial fibrillation    Comments:      Atrial fibrillation with a right posterior fascicular block right bundle-branch block, no acute ST-T wave changes, baseline artifact read as flutter by the machine but most consistent with fibrillation           Phone Contacts  ED Phone Contact    ED Course      wife reports that his urine is always contaminated comma requested no treatment for his urine even if it shows leukocytes  I understand due to the fact the patient has an ileostomy  Will not treat at their wishes  patient's electrolytes showed a BUN of 33 a creatinine at 2 43 consistent with renal insufficiency dehydration acute kidney injury  Nephrology was consulted  Patient's cardiac troponin was 0 44 believe this is secondary to his renal insufficiency  Liver functions were within normal limits  No sign of hepatitis  patient's white count was normal at 6 22 no sign of inflammation, hemoglobin normal at 12 3 no sign of anemia  MDM medical decision making 40-year-old male, history of renal insufficiency presents emergency department with weakness, not eating, dry mucous membranes, presentation consistent with dehydration  Electrolytes were consistent with dehydration  Patient received IV hydration here will require hospitalization for further hydration over the next few days  Patient's wife reports she can no longer care for him  Discussed with the patient and his nephrologist   Discussed with the hospitalist service  May require palliative care or consultation with  for rehabilitation or placement      Disposition  Final diagnoses:   Weakness   Dehydration     Time reflects when diagnosis was documented in both MDM as applicable and the Disposition within this note Time User Action Codes Description Comment    2/18/2019 12:19 PM Oral Antes Add [R53 1] Weakness     2/18/2019  1:37 PM Maryellen Horse [E86 0] Dehydration     2/18/2019  5:12 PM Mangani, Delpha Boop Add [Z98 62] H/O carotid angioplasty     2/18/2019  5:12 PM Vito Fernandez Modify [Z98 62] H/O carotid angioplasty     2/18/2019  5:12 PM Mangani, Chaz V Remove [Z98 62] H/O carotid angioplasty     2/18/2019  5:12 PM Mangani, Chaz V Add [N17 9] JUSTINE (acute kidney injury) ([facility] )     2/18/2019  5:12 PM Mangani, Jeanne Haste V Add [I10] Essential hypertension     2/18/2019  5:12 PM Mangani, Chaz V Modify [I10] Essential hypertension     2/18/2019  5:12 PM Mangani, Chaz V Add [Z86 79] History of CHF (congestive heart failure)     2/18/2019  5:12 PM Mangani, Delpha Boop Remove [Z86 79] History of CHF (congestive heart failure)     2/18/2019  5:12 PM Mangani, Jeanne Haste V Add [Z86 79] History of CHF (congestive heart failure)     2/18/2019  5:12 PM Mangani, Chaz V Add [I48 0] Paroxysmal atrial fibrillation ([facility] )     2/18/2019  5:12 PM Mangani, Chaz V Add [N18 4] CKD (chronic kidney disease) stage 4, GFR 15-29 ml/min ([facility]     2/18/2019  5:13 PM Mangani, Chaz V Add [R53 1] Generalized weakness     2/18/2019  5:13 PM Mangani, Chaz V Add [C67 9] Malignant neoplasm of urinary bladder, unspecified site [facility])       ED Disposition     ED Disposition Condition Date/Time Comment    Admit Stable Mon Feb 18, 2019  1:37 PM Case was discussed with hospitalist service  and the patient's admission status was agreed to be 2 midnights the service of Dr Brigido Santiago    None         Current Discharge Medication List      CONTINUE these medications which have NOT CHANGED    Details   aspirin (ECOTRIN LOW STRENGTH) 81 mg EC tablet Take 81 mg by mouth daily      carvedilol (COREG) 6 25 mg tablet Take 6 25 mg by mouth 2 (two) times a day with meals      furosemide (LASIX) 40 mg tablet Take 1 tablet (40 mg total) by mouth 2 (two) times a day  Qty: 30 tablet, Refills: 0    Associated Diagnoses: Acute systolic (congestive) heart failure (HCC)      potassium chloride (K-DUR,KLOR-CON) 20 mEq tablet Take 1 tablet (20 mEq total) by mouth 2 (two) times a day  Qty: 30 tablet, Refills: 0    Associated Diagnoses: Acute systolic (congestive) heart failure (HCC)      ALPRAZolam (XANAX) 0 5 mg tablet Take 0 5 mg by mouth daily as needed  Refills: 0           No discharge procedures on file      ED Provider  Electronically Signed by           Darletta Schlatter, MD  02/18/19 5082

## 2019-02-18 NOTE — ASSESSMENT & PLAN NOTE
Noted 0 44  EKG shows Afib with artifacts  Patient currently denies chest pain, dyspnea, nausea, diaphoreses  Denies any complaints  Likely non ischemic etiology in the settings of CKD stage 4    Trend troponin  Continue supportive care

## 2019-02-18 NOTE — ASSESSMENT & PLAN NOTE
History of CAD status post CABG  Denies any new complaints  Not in acute distress    Continue aspirin and beta-blocker  Patient follows up with Dr Colten Smyth outpatient

## 2019-02-18 NOTE — ASSESSMENT & PLAN NOTE
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, ileal conduit, presented with complaining of generalized weakness  As per wife patient has been gradually deteriorating  On my encounter patient is not in acute distress  Frail, Ill appearing but nontoxic appearing  Awake, alert, oriented to person only  Denies any complaints,Other than feeling cold  Denies chest pain, dyspnea, cough, abdominal pain, diarrhea  No significant new abnormalities noted  Ileal conduit bag noted with clear urine  No fever, no leukocytosis noted  Chest x-ray report noted for left lower lobe effusion, opacity        Likely viral etiology vs worsening of dementia   Follow-up respiratory pathogen  IV hydration  Monitor off antibiotics since no signs of active bacterial infection present  Follow-up procalcitonin  Thyroid studies  Continue supportive care  PTOT eval  Palliative care consult

## 2019-02-19 LAB
ANION GAP SERPL CALCULATED.3IONS-SCNC: 7 MMOL/L (ref 4–13)
ATRIAL RATE: 268 BPM
BASOPHILS # BLD AUTO: 0.03 THOUSANDS/ΜL (ref 0–0.1)
BASOPHILS NFR BLD AUTO: 1 % (ref 0–1)
BUN SERPL-MCNC: 34 MG/DL (ref 5–25)
CALCIUM SERPL-MCNC: 8.9 MG/DL (ref 8.3–10.1)
CHLORIDE SERPL-SCNC: 102 MMOL/L (ref 100–108)
CO2 SERPL-SCNC: 29 MMOL/L (ref 21–32)
CREAT SERPL-MCNC: 2.5 MG/DL (ref 0.6–1.3)
EOSINOPHIL # BLD AUTO: 0.11 THOUSAND/ΜL (ref 0–0.61)
EOSINOPHIL NFR BLD AUTO: 2 % (ref 0–6)
ERYTHROCYTE [DISTWIDTH] IN BLOOD BY AUTOMATED COUNT: 15.9 % (ref 11.6–15.1)
FLUAV AG SPEC QL: NOT DETECTED
FLUBV AG SPEC QL: NOT DETECTED
GFR SERPL CREATININE-BSD FRML MDRD: 22 ML/MIN/1.73SQ M
GLUCOSE SERPL-MCNC: 126 MG/DL (ref 65–140)
HCT VFR BLD AUTO: 37.3 % (ref 36.5–49.3)
HGB BLD-MCNC: 11.6 G/DL (ref 12–17)
IMM GRANULOCYTES # BLD AUTO: 0.02 THOUSAND/UL (ref 0–0.2)
IMM GRANULOCYTES NFR BLD AUTO: 0 % (ref 0–2)
LYMPHOCYTES # BLD AUTO: 1.11 THOUSANDS/ΜL (ref 0.6–4.47)
LYMPHOCYTES NFR BLD AUTO: 18 % (ref 14–44)
MAGNESIUM SERPL-MCNC: 2.1 MG/DL (ref 1.6–2.6)
MCH RBC QN AUTO: 26.5 PG (ref 26.8–34.3)
MCHC RBC AUTO-ENTMCNC: 31.1 G/DL (ref 31.4–37.4)
MCV RBC AUTO: 85 FL (ref 82–98)
MONOCYTES # BLD AUTO: 0.78 THOUSAND/ΜL (ref 0.17–1.22)
MONOCYTES NFR BLD AUTO: 13 % (ref 4–12)
NEUTROPHILS # BLD AUTO: 4.02 THOUSANDS/ΜL (ref 1.85–7.62)
NEUTS SEG NFR BLD AUTO: 66 % (ref 43–75)
NRBC BLD AUTO-RTO: 0 /100 WBCS
P AXIS: 54 DEGREES
PLATELET # BLD AUTO: 156 THOUSANDS/UL (ref 149–390)
PMV BLD AUTO: 10.9 FL (ref 8.9–12.7)
POTASSIUM SERPL-SCNC: 4.2 MMOL/L (ref 3.5–5.3)
PROCALCITONIN SERPL-MCNC: 0.07 NG/ML
PROLACTIN SERPL-MCNC: 13.1 NG/ML (ref 2.5–17.4)
QRS AXIS: 115 DEGREES
QRSD INTERVAL: 146 MS
QT INTERVAL: 456 MS
QTC INTERVAL: 481 MS
RBC # BLD AUTO: 4.38 MILLION/UL (ref 3.88–5.62)
RSV B RNA SPEC QL NAA+PROBE: NOT DETECTED
SODIUM SERPL-SCNC: 138 MMOL/L (ref 136–145)
T WAVE AXIS: 53 DEGREES
TROPONIN I SERPL-MCNC: 0.23 NG/ML
VENTRICULAR RATE: 67 BPM
WBC # BLD AUTO: 6.07 THOUSAND/UL (ref 4.31–10.16)

## 2019-02-19 PROCEDURE — 83735 ASSAY OF MAGNESIUM: CPT | Performed by: STUDENT IN AN ORGANIZED HEALTH CARE EDUCATION/TRAINING PROGRAM

## 2019-02-19 PROCEDURE — 84484 ASSAY OF TROPONIN QUANT: CPT | Performed by: STUDENT IN AN ORGANIZED HEALTH CARE EDUCATION/TRAINING PROGRAM

## 2019-02-19 PROCEDURE — 99223 1ST HOSP IP/OBS HIGH 75: CPT | Performed by: INTERNAL MEDICINE

## 2019-02-19 PROCEDURE — 87040 BLOOD CULTURE FOR BACTERIA: CPT | Performed by: PHYSICIAN ASSISTANT

## 2019-02-19 PROCEDURE — 93010 ELECTROCARDIOGRAM REPORT: CPT | Performed by: INTERNAL MEDICINE

## 2019-02-19 PROCEDURE — 84145 PROCALCITONIN (PCT): CPT | Performed by: PHYSICIAN ASSISTANT

## 2019-02-19 PROCEDURE — 99232 SBSQ HOSP IP/OBS MODERATE 35: CPT | Performed by: PHYSICIAN ASSISTANT

## 2019-02-19 PROCEDURE — 99232 SBSQ HOSP IP/OBS MODERATE 35: CPT | Performed by: INTERNAL MEDICINE

## 2019-02-19 PROCEDURE — 80048 BASIC METABOLIC PNL TOTAL CA: CPT | Performed by: STUDENT IN AN ORGANIZED HEALTH CARE EDUCATION/TRAINING PROGRAM

## 2019-02-19 PROCEDURE — 85025 COMPLETE CBC W/AUTO DIFF WBC: CPT | Performed by: STUDENT IN AN ORGANIZED HEALTH CARE EDUCATION/TRAINING PROGRAM

## 2019-02-19 RX ORDER — DRONABINOL 2.5 MG/1
2.5 CAPSULE ORAL
Status: DISCONTINUED | OUTPATIENT
Start: 2019-02-19 | End: 2019-02-21 | Stop reason: HOSPADM

## 2019-02-19 RX ORDER — ACETAMINOPHEN 325 MG/1
975 TABLET ORAL EVERY 8 HOURS PRN
Status: DISCONTINUED | OUTPATIENT
Start: 2019-02-19 | End: 2019-02-21 | Stop reason: HOSPADM

## 2019-02-19 RX ADMIN — FUROSEMIDE 40 MG: 10 INJECTION, SOLUTION INTRAMUSCULAR; INTRAVENOUS at 06:01

## 2019-02-19 RX ADMIN — ASPIRIN 81 MG: 81 TABLET, COATED ORAL at 08:38

## 2019-02-19 RX ADMIN — HEPARIN SODIUM 5000 UNITS: 5000 INJECTION, SOLUTION INTRAVENOUS; SUBCUTANEOUS at 21:55

## 2019-02-19 RX ADMIN — DRONABINOL 2.5 MG: 2.5 CAPSULE ORAL at 16:11

## 2019-02-19 RX ADMIN — HEPARIN SODIUM 5000 UNITS: 5000 INJECTION, SOLUTION INTRAVENOUS; SUBCUTANEOUS at 08:39

## 2019-02-19 RX ADMIN — CARVEDILOL 6.25 MG: 6.25 TABLET, FILM COATED ORAL at 08:38

## 2019-02-19 RX ADMIN — CARVEDILOL 6.25 MG: 6.25 TABLET, FILM COATED ORAL at 17:14

## 2019-02-19 RX ADMIN — FUROSEMIDE 40 MG: 10 INJECTION, SOLUTION INTRAMUSCULAR; INTRAVENOUS at 17:15

## 2019-02-19 NOTE — PLAN OF CARE
Problem: Potential for Falls  Goal: Patient will remain free of falls  Description  INTERVENTIONS:  - Assess patient frequently for physical needs  -  Identify cognitive and physical deficits and behaviors that affect risk of falls    -  Covert fall precautions as indicated by assessment   - Educate patient/family on patient safety including physical limitations  - Instruct patient to call for assistance with activity based on assessment  - Modify environment to reduce risk of injury  - Consider OT/PT consult to assist with strengthening/mobility  Outcome: Progressing     Problem: PAIN - ADULT  Goal: Verbalizes/displays adequate comfort level or baseline comfort level  Description  Interventions:  - Encourage patient to monitor pain and request assistance  - Assess pain using appropriate pain scale  - Administer analgesics based on type and severity of pain and evaluate response  - Implement non-pharmacological measures as appropriate and evaluate response  - Consider cultural and social influences on pain and pain management  - Notify physician/advanced practitioner if interventions unsuccessful or patient reports new pain  Outcome: Progressing     Problem: INFECTION - ADULT  Goal: Absence or prevention of progression during hospitalization  Description  INTERVENTIONS:  - Assess and monitor for signs and symptoms of infection  - Monitor lab/diagnostic results  - Monitor all insertion sites, i e  indwelling lines, tubes, and drains  - Monitor endotracheal (as able) and nasal secretions for changes in amount and color  - Covert appropriate cooling/warming therapies per order  - Administer medications as ordered  - Instruct and encourage patient and family to use good hand hygiene technique  - Identify and instruct in appropriate isolation precautions for identified infection/condition  Outcome: Progressing  Goal: Absence of fever/infection during neutropenic period  Description  INTERVENTIONS:  - Monitor WBC  - Implement neutropenic guidelines  Outcome: Progressing     Problem: SAFETY ADULT  Goal: Maintain or return to baseline ADL function  Description  INTERVENTIONS:  -  Assess patient's ability to carry out ADLs; assess patient's baseline for ADL function and identify physical deficits which impact ability to perform ADLs (bathing, care of mouth/teeth, toileting, grooming, dressing, etc )  - Assess/evaluate cause of self-care deficits   - Assess range of motion  - Assess patient's mobility; develop plan if impaired  - Assess patient's need for assistive devices and provide as appropriate  - Encourage maximum independence but intervene and supervise when necessary  ¯ Involve family in performance of ADLs  ¯ Assess for home care needs following discharge   ¯ Request OT consult to assist with ADL evaluation and planning for discharge  ¯ Provide patient education as appropriate  Outcome: Progressing  Goal: Maintain or return mobility status to optimal level  Description  INTERVENTIONS:  - Assess patient's baseline mobility status (ambulation, transfers, stairs, etc )    - Identify cognitive and physical deficits and behaviors that affect mobility  - Identify mobility aids required to assist with transfers and/or ambulation (gait belt, sit-to-stand, lift, walker, cane, etc )  - [place] fall precautions as indicated by assessment  - Record patient progress and toleration of activity level on Mobility SBAR; progress patient to next Phase/Stage  - Instruct patient to call for assistance with activity based on assessment  - Request Rehabilitation consult to assist with strengthening/weightbearing, etc   Outcome: Progressing     Problem: DISCHARGE PLANNING  Goal: Discharge to home or other facility with appropriate resources  Description  INTERVENTIONS:  - Identify barriers to discharge w/patient and caregiver  - Arrange for needed discharge resources and transportation as appropriate  - Identify discharge learning needs (meds, wound care, etc )  - Arrange for interpretive services to assist at discharge as needed  - Refer to Case Management Department for coordinating discharge planning if the patient needs post-hospital services based on physician/advanced practitioner order or complex needs related to functional status, cognitive ability, or social support system  Outcome: Progressing     Problem: Knowledge Deficit  Goal: Patient/family/caregiver demonstrates understanding of disease process, treatment plan, medications, and discharge instructions  Description  Complete learning assessment and assess knowledge base  Interventions:  - Provide teaching at level of understanding  - Provide teaching via preferred learning methods  Outcome: Progressing     Problem: Prexisting or High Potential for Compromised Skin Integrity  Goal: Skin integrity is maintained or improved  Description  INTERVENTIONS:  - Identify patients at risk for skin breakdown  - Assess and monitor skin integrity  - Assess and monitor nutrition and hydration status  - Monitor labs (i e  albumin)  - Assess for incontinence   - Turn and reposition patient  - Assist with mobility/ambulation  - Relieve pressure over bony prominences  - Avoid friction and shearing  - Provide appropriate hygiene as needed including keeping skin clean and dry  - Evaluate need for skin moisturizer/barrier cream  - Collaborate with interdisciplinary team (i e  Nutrition, Rehabilitation, etc )   - Patient/family teaching  Outcome: Progressing     Problem: Nutrition/Hydration-ADULT  Goal: Nutrient/Hydration intake appropriate for improving, restoring or maintaining nutritional needs  Description  Monitor and assess patient's nutrition/hydration status for malnutrition (ex- brittle hair, bruises, dry skin, pale skin and conjunctiva, muscle wasting, smooth red tongue, and disorientation)  Collaborate with interdisciplinary team and initiate plan and interventions as ordered    Monitor patient's weight and dietary intake as ordered or per policy  Utilize nutrition screening tool and intervene per policy  Determine patient's food preferences and provide high-protein, high-caloric foods as appropriate       INTERVENTIONS:  - Monitor oral intake, urinary output, labs, and treatment plans  - Assess nutrition and hydration status and recommend course of action  - Evaluate amount of meals eaten  - Assist patient with eating if necessary   - Allow adequate time for meals  - Recommend/ encourage appropriate diets, oral nutritional supplements, and vitamin/mineral supplements  - Order, calculate, and assess calorie counts as needed  - Recommend, monitor, and adjust tube feedings and TPN/PPN based on assessed needs  - Assess need for intravenous fluids  - Provide specific nutrition/hydration education as appropriate  - Include patient/family/caregiver in decisions related to nutrition  Outcome: Progressing

## 2019-02-19 NOTE — ASSESSMENT & PLAN NOTE
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, ileal conduit, presented with complaining of generalized weakness  · Patient has been gradually deteriorating with functional decline with failure to thrive and poor appetite  · No fever; no leukocytosis noted  Ileal conduit bag noted with clear urine  Chest x-ray report noted for left lower lobe effusion, opacity  Influenza/RSV negative  TSH normal   · Follow up Procalcitonin, urine culture, blood cultures, Echo  · Per palliative care's discussion with wife, she would like to see how he does over the next day or two with further testing and if not improving/no reversible cause noted, then would want patient to be hospice  · Marinol added to increase appetite

## 2019-02-19 NOTE — ASSESSMENT & PLAN NOTE
· Trending down: 0 44/0 38/0  23   · EKG shows Afib with artifacts  · Patient currently denies chest pain, dyspnea, nausea, diaphoreses  Denies any complaints  · Likely non ischemic etiology in the settings of CKD stage 4   · On Telemetry  · Continue supportive care

## 2019-02-19 NOTE — PROGRESS NOTES
NEPHROLOGY PROGRESS NOTE    Patient: Vasiliy Palmer               Sex: male          DOA: 2/18/2019 11:56 AM   YOB: 1930        Age:  80 y o         LOS:  LOS: 1 day       HPI     Patient admitted generalized weakness    SUBJECTIVE     Feeling better though still quite weak  Complaining of not getting food  Does feel a drinking water  No chest pain no palpitation or shortness of Breath now    CURRENT MEDICATIONS       Current Facility-Administered Medications:     aspirin (ECOTRIN LOW STRENGTH) EC tablet 81 mg, 81 mg, Oral, Daily, Chaz MCCORMICK MD, 81 mg at 02/19/19 0838    carvedilol (COREG) tablet 6 25 mg, 6 25 mg, Oral, BID With Meals, Brittney MCCORMICK MD, 6 25 mg at 02/19/19 0838    furosemide (LASIX) injection 40 mg, 40 mg, Intravenous, Q12H, Chaz MCCORMICK MD, 40 mg at 02/19/19 0601    heparin (porcine) subcutaneous injection 5,000 Units, 5,000 Units, Subcutaneous, Q12H [address], 5,000 Units at 02/19/19 0839 **AND** [CANCELED] Platelet count, , , AM Draw, Brittney MCCORMICK MD    OBJECTIVE     Current Weight: Weight - Scale: 63 9 kg (140 lb 14 oz)  Vitals:    02/19/19 0330   BP: 156/87   Pulse: 98   Resp: 18   Temp: 97 88 °F (36 6 °C)   SpO2: 98%       Intake/Output Summary (Last 24 hours) at 2/19/2019 1110  Last data filed at 2/19/2019 1050  Gross per 24 hour   Intake 120 ml   Output 2600 ml   Net -2480 ml       PHYSICAL EXAMINATION     Physical Exam   Constitutional: He is oriented to person, place, and time  He appears well-developed  No distress  HENT:   Head: Normocephalic  Mouth/Throat: Oropharynx is clear and moist    Eyes: Conjunctivae are normal  No scleral icterus  Neck: Neck supple  No JVD present  Cardiovascular: Normal rate and normal heart sounds  Pulmonary/Chest: Effort normal  He has wheezes  Abdominal: Soft  He exhibits no distension  There is no tenderness  Musculoskeletal: Normal range of motion  He exhibits edema     Neurological: He is alert and oriented to person, place, and time  Skin: Skin is warm  No rash noted  Psychiatric: He has a normal mood and affect  His behavior is normal         LAB RESULTS     Results from last 7 days   Lab Units 02/18/19  1220 02/14/19  1049   WBC Thousand/uL 6 22 6 57   HEMOGLOBIN g/dL 12 3 12 9   HEMATOCRIT % 40 2 41 8   PLATELETS Thousands/uL 151 158   POTASSIUM mmol/L 4 4 4 7   CHLORIDE mmol/L 103 101   CO2 mmol/L 26 28   BUN mg/dL 33* 35*   CREATININE mg/dL 2 43* 2 56*   EGFR ml/min/1 73sq m 23 21   CALCIUM mg/dL 9 0 9 0   PHOSPHORUS mg/dL  --  2 7       RADIOLOGY RESULTS      No results found for this or any previous visit  Results for orders placed during the hospital encounter of 02/18/19   XR chest pa & lateral    Narrative CHEST     INDICATION:   weakness  COMPARISON:  8/31/2018    EXAM PERFORMED/VIEWS:  XR CHEST PA & LATERAL      FINDINGS:  Median sternotomy wires are present  Cardiomediastinal silhouette is stable  Moderate left pleural effusion is identified  Associated left lung base opacity is present  Osseous structures appear within normal limits for patient age  Impression Moderate left pleural effusion and associated left lung base opacity  Workstation performed: [id number]       No results found for this or any previous visit  No results found for this or any previous visit  Results for orders placed during the hospital encounter of 08/31/18   CT abdomen pelvis wo contrast    Narrative CT ABDOMEN AND PELVIS WITHOUT IV CONTRAST    INDICATION:   gross hematuria from ileal conduit/urostomy   "gross hematuria, h/o bladder ca"    COMPARISON:  CT chest on pelvis 8/24/2018  TECHNIQUE:  CT examination of the abdomen and pelvis was performed without intravenous contrast   Axial, sagittal, and coronal 2D reformatted images were created from the source data and submitted for interpretation  Radiation dose length product (DLP) for this visit:  576 mGy-cm     This examination, like all CT scans performed in the [facility], was performed utilizing techniques to minimize radiation dose exposure, including the use of iterative   reconstruction and automated exposure control  Enteric contrast was administered  FINDINGS:    ABDOMEN    LOWER CHEST:  Persistent, partially imaged left pleural effusion  Right-sided effusion is largely resolved  LIVER/BILIARY TREE:  Hepatic cirrhosis  GALLBLADDER:  There are gallstone(s) within the gallbladder, without pericholecystic inflammatory changes  SPLEEN:  Scattered calcified granulomas  PANCREAS:  Unremarkable  ADRENAL GLANDS:  Unremarkable  KIDNEYS/URETERS:  Polycystic kidney disease; no change from May 2014  No hydronephrosis or perinephric collection  STOMACH AND BOWEL:  Colonic diverticulosis  No acute bowel findings  APPENDIX:  No findings to suggest appendicitis  ABDOMINOPELVIC CAVITY:  Right lower quadrant ileal conduit  No apparent complications  VESSELS:  Unremarkable for patient's age  PELVIS    REPRODUCTIVE ORGANS:  Prostatectomy  URINARY BLADDER:  Status post cystectomy  ABDOMINAL WALL/INGUINAL REGIONS:  Previously seen supraumbilical hernia no longer contains bowel  Only a small amount of herniated fat is present within the hernia  OSSEOUS STRUCTURES:  No acute fracture or destructive osseous lesion  Impression Right lower quadrant ileal conduit without complications  Polycystic kidneys unchanged from the prior study  No new renal findings  Persistent partially imaged left basilar effusion and resolved right basilar effusion  Previously described supraumbilical hernia no longer contains a segment of bowel  Workstation performed: [id number]       No results found for this or any previous visit  PLAN / RECOMMENDATIONS      CKD stage 4:  Seems to be stable    Generalized weakness slowly improving  Cardiomyopathy:  On diuretic  Echocardiogram to be done    Will continue to monitor    Gabi Mcclure MD  Nephrology  2/19/2019        Portions of the record may have been created with voice recognition software  Occasional wrong word or "sound a like" substitutions may have occurred due to the inherent limitations of voice recognition software  Read the chart carefully and recognize, using context, where substitutions have occurred

## 2019-02-19 NOTE — ASSESSMENT & PLAN NOTE
History of CHF  Echo in August 2018 shows EF of 40-45%  Currently on Lasix 40 mg b i d  Wife reported that patient has not been taking diuretics for last couple days  Chest x-ray noted for increased vascular congestion, left effusion  Does not appear to be in acute exacerbation  No signs or symptoms of acute respiratory distress  Will give IV Lasix 40 BID while inpatient  Low-salt diet  Continue I&O monitoring  Daily weights  Repeat Echo

## 2019-02-19 NOTE — PROGRESS NOTES
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, ileal conduit, presented with complaining of generalized weakness  · Patient has been gradually deteriorating with functional decline with failure to thrive and poor appetite  · No fever; no leukocytosis noted  Ileal conduit bag noted with clear urine  Chest x-ray report noted for left lower lobe effusion, opacity  Influenza/RSV negative  TSH normal   · Follow up Procalcitonin, urine culture, blood cultures, Echo  · Per palliative care's discussion with wife, she would like to see how he does over the next day or two with further testing and if not improving/no reversible cause noted, then would want patient to be hospice  · Marinol added to increase appetite  Chronic kidney disease (CKD), stage IV (severe) ([facility])  Assessment & Plan  · History of CKD stage 4   · Baseline creatinine around 2 3 To 2 5 range  · Currently appears to be at baseline  · Appreciate nephrology input  CAD (coronary artery disease)  Assessment & Plan  · History of CAD status post CABG  · Denies any new complaints  · Not in acute distress  · Continue aspirin and beta-blocker  Atrial fibrillation ([facility] [address] )  Assessment & Plan  · History of AFib on beta-blocker  Currently not on any anticoagulation due to hematuria in the past     History of CHF (congestive heart failure)  Assessment & Plan  History of CHF  Echo in August 2018 shows EF of 40-45%  Currently on Lasix 40 mg b i d  Wife reported that patient has not been taking diuretics for last couple days    Chest x-ray noted for increased vascular congestion, left effusion  Does not appear to be in acute exacerbation  No signs or symptoms of acute respiratory distress  Will give IV Lasix 40 BID while inpatient  Low-salt diet  Continue I&O monitoring  Daily weights  Repeat Echo  Elevated troponin  Assessment & Plan  · Trending down: 0 44/0 38/0  23   · EKG shows Afib with artifacts  · Patient currently denies chest pain, dyspnea, nausea, diaphoreses  Denies any complaints  · Likely non ischemic etiology in the settings of CKD stage 4   · On Telemetry  · Continue supportive care  VTE Pharmacologic Prophylaxis:   Pharmacologic: Heparin  Mechanical VTE Prophylaxis in Place: Yes    Patient Centered Rounds: Discussed with nurse outside of patient's room  Discussions with Specialists or Other Care Team Provider: Discussed with nephrology and palliative care  Education and Discussions with Family / Patient: Will discuss with wife  Time Spent for Care: 30 minutes  More than 50% of total time spent on counseling and coordination of care as described above  Current Length of Stay: 1 day(s)    Current Patient Status: Inpatient   Certification Statement: The patient will continue to require additional inpatient hospital stay due to management of above conditions  Discharge Plan: Not medically cleared  Code Status: Level 3 - DNAR and DNI      Subjective:   Patient reports he wants coffee  Presented with progressive weakness and functional decline  Denies SOB or CP  Objective:     Vitals:   Temp (24hrs), Av °F (36 7 °C), Min:97 88 °F (36 6 °C), Max:98 1 °F (36 7 °C)    Temp:  [97 88 °F (36 6 °C)-98 1 °F (36 7 °C)] 97 88 °F (36 6 °C)  HR:  [] 98  Resp:  [18] 18  BP: (141-168)/(87-94) 156/87  SpO2:  [97 %-99 %] 98 %  Body mass index is 23 44 kg/m²  Input and Output Summary (last 24 hours):        Intake/Output Summary (Last 24 hours) at 2019 1414  Last data filed at 2019 1050  Gross per 24 hour   Intake 120 ml   Output 2600 ml   Net -2480 ml       Physical Exam:     Physical Exam   Constitutional:   Frail, chronically ill appearing  HENT:   Head: Normocephalic and atraumatic  Eyes: No scleral icterus  Cardiovascular:   S1S2  Irregular rate  Pulmonary/Chest: No respiratory distress  Decreased breath sounds  Abdominal: Soft  Bowel sounds are normal  There is no tenderness  Musculoskeletal: He exhibits no edema  Neurological: He is alert  Skin: Skin is warm and dry  Vitals reviewed  Additional Data:     Labs:    Results from last 7 days   Lab Units 02/19/19  1126   WBC Thousand/uL 6 07   HEMOGLOBIN g/dL 11 6*   HEMATOCRIT % 37 3   PLATELETS Thousands/uL 156   NEUTROS PCT % 66   LYMPHS PCT % 18   MONOS PCT % 13*   EOS PCT % 2     Results from last 7 days   Lab Units 02/19/19  1126 02/18/19  1220   POTASSIUM mmol/L 4 2 4 4   CHLORIDE mmol/L 102 103   CO2 mmol/L 29 26   BUN mg/dL 34* 33*   CREATININE mg/dL 2 50* 2 43*   CALCIUM mg/dL 8 9 9 0   ALK PHOS U/L  --  95   ALT U/L  --  9*   AST U/L  --  24     Results from last 7 days   Lab Units 02/18/19  1220   INR  1 26*       * I Have Reviewed All Lab Data Listed Above  * Additional Pertinent Lab Tests Reviewed: All Labs Within Last 24 Hours Reviewed    Imaging:    Imaging Reports Reviewed Today Include: CXR as noted above  Recent Cultures (last 7 days):     Results from last 7 days   Lab Units 02/18/19  1708   URINE CULTURE  Culture results to follow     INFLUENZA B PCR  Not Detected   RSV PCR  Not Detected       Last 24 Hours Medication List:     Current Facility-Administered Medications:  acetaminophen 975 mg Oral Q8H PRN Duane Tovar MD   aspirin 81 mg Oral Daily Chaz MCCORMICK MD   carvedilol 6 25 mg Oral BID With Meals Chaz MCCORMICK MD   dronabinol 2 5 mg Oral BID before lunch/dinner Duane Tovar MD   furosemide 40 mg Intravenous Q12H Chaz MCCORMICK MD   heparin (porcine) 5,000 Units Subcutaneous Q12H [facility] Chaz Gibbons V MD        Today, Patient Was Seen By: Modesta Amezcua PA-C    ** Please Note: Dictation voice to text software may have been used in the creation of this document   **

## 2019-02-19 NOTE — ASSESSMENT & PLAN NOTE
· History of CAD status post CABG  · Denies any new complaints  · Not in acute distress  · Continue aspirin and beta-blocker

## 2019-02-19 NOTE — ASSESSMENT & PLAN NOTE
· History of AFib on beta-blocker    Currently not on any anticoagulation due to hematuria in the past

## 2019-02-19 NOTE — CONSULTS
Consultation - Nephrology   Jazzmine Arevalo 80 y o  male MRN: [id number]  Unit/Bed#: -01 Encounter: [id number]    Referring PHYSICIAN: Alton Spencer    REASON FOR THE CONSULTATION:  CKD stage 4    DATE OF CONSULTATION:  February 18, 2019    ADMISSION DIAGNOSIS: Generalized weakness     CHIEF COMPLAINT     Patient known to me with stage IV CKD was advised to cut the emergency room at request as his wife call me at patient is not doing very well feeling quite weak and quite lethargic    HPI     Patient is known to be stage IV CKD whom I just saw in office last week  According to his wife he is feeling very weak very tired and sleepy all the time this is going on almost for a month but gradually getting worse    Talking the patient he does say he is not eating well feeling very weak very tired  Does not feel like doing anything  He    He denies chest pain but does get short of breath little exertion does have nausea no appetite does have decreased urine output  In my office blood pressure was quite low    Patient does history of cardiomyopathy and does see cardiologist in the office  Does have advanced kidney disease with stage IV with GFR in range of 20  I discussed dialysis with him in the past and he does not want to go on dialysis and want to stay comfortable only    PAST MEDICAL HISTORY     Past Medical History:   Diagnosis Date    Atrial fibrillation ([facility] [address] )     CAD (coronary artery disease)     Cancer ([facility] [address] )     bladder    Cerebellar stroke, acute ([facility] [address] ) 7/16/2017    CHF (congestive heart failure) (HCC)     Chronic kidney disease     COPD (chronic obstructive pulmonary disease) (HCC)     Hepatitis C     Hypertension     TIA (transient ischemic attack)        PAST SURGICAL HISTORY     Past Surgical History:   Procedure Laterality Date    CAROTID ARTERY ANGIOPLASTY      COLONOSCOPY N/A 3/24/2017    Procedure: COLONOSCOPY;  Surgeon: Cynthia Salgado MD;  Location: [facility];   Service:    [facility] CORONARY ARTERY BYPASS GRAFT      CYSTECTOMY      ESOPHAGOGASTRODUODENOSCOPY N/A 3/23/2017    Procedure: ESOPHAGOGASTRODUODENOSCOPY (EGD); Surgeon: Anette Pearce MD;  Location: [facility]; Service:     EYE SURGERY      ILEO CONDUIT         ALLERGIES     No Known Allergies    SOCIAL HISTORY     Social History     Substance and Sexual Activity   Alcohol Use Not Currently     Social History     Substance and Sexual Activity   Drug Use No     Social History     Tobacco Use   Smoking Status Former Smoker    Packs/day: 1 00    Years: 5 00    Pack years: 5 00    Types: Cigarettes    Last attempt to quit: 1973    Years since quittin 0   Smokeless Tobacco Former User       FAMILY HISTORY     Family History   Problem Relation Age of Onset    Coronary artery disease Mother     Lung disease Father     Heart disease Brother        CURRENT MEDICATIONS       Current Facility-Administered Medications:     aspirin (ECOTRIN LOW STRENGTH) EC tablet 81 mg, 81 mg, Oral, Daily, Chaz MCCORMICK MD, 81 mg at 19 0838    carvedilol (COREG) tablet 6 25 mg, 6 25 mg, Oral, BID With Meals, Se MCCORMICK MD, 6 25 mg at 19 0838    furosemide (LASIX) injection 40 mg, 40 mg, Intravenous, Q12H, Chaz MCCORMICK MD, 40 mg at 19 0601    heparin (porcine) subcutaneous injection 5,000 Units, 5,000 Units, Subcutaneous, Q12H [facility], 5,000 Units at 19 0839 **AND** [CANCELED] Platelet count, , , AM Draw, Se MCCORMICK MD    REVIEW OF SYSTEMS     Review of Systems   Constitutional: Positive for activity change, appetite change and fatigue  HENT: Positive for hearing loss  Negative for congestion, ear discharge, postnasal drip, sinus pressure and sinus pain  Eyes: Negative for photophobia, pain and visual disturbance  Respiratory: Positive for cough and shortness of breath  Negative for apnea, choking and chest tightness  Cardiovascular: Positive for leg swelling   Negative for chest pain and palpitations  Gastrointestinal: Negative for abdominal distention, abdominal pain, blood in stool, constipation, diarrhea, nausea and vomiting  Endocrine: Negative for heat intolerance, polyphagia and polyuria  Genitourinary: Positive for decreased urine volume  Negative for difficulty urinating, flank pain and urgency  Musculoskeletal: Positive for arthralgias and back pain  Negative for neck pain and neck stiffness  Skin: Negative for color change and wound  Allergic/Immunologic: Negative for food allergies and immunocompromised state  Neurological: Positive for dizziness, weakness and light-headedness  Negative for seizures and facial asymmetry  Hematological: Negative for adenopathy  Does not bruise/bleed easily  Psychiatric/Behavioral: Negative for self-injury and suicidal ideas  LAB RESULTS        Results from last 7 days   Lab Units 02/18/19  1220 02/14/19  1049   WBC Thousand/uL 6 22 6 57   HEMOGLOBIN g/dL 12 3 12 9   HEMATOCRIT % 40 2 41 8   PLATELETS Thousands/uL 151 158   POTASSIUM mmol/L 4 4 4 7   CHLORIDE mmol/L 103 101   CO2 mmol/L 26 28   BUN mg/dL 33* 35*   CREATININE mg/dL 2 43* 2 56*   EGFR ml/min/1 73sq m 23 21   CALCIUM mg/dL 9 0 9 0   PHOSPHORUS mg/dL  --  2 7       I have personally reviewed the old medical records and patient's previously known baseline creatinine level is ~ 2 4    RADIOLOGY RESULTS     No results found for this or any previous visit  Results for orders placed during the hospital encounter of 02/18/19   XR chest pa & lateral    Narrative CHEST     INDICATION:   weakness  COMPARISON:  8/31/2018    EXAM PERFORMED/VIEWS:  XR CHEST PA & LATERAL      FINDINGS:  Median sternotomy wires are present  Cardiomediastinal silhouette is stable  Moderate left pleural effusion is identified  Associated left lung base opacity is present  Osseous structures appear within normal limits for patient age        Impression Moderate left pleural effusion and associated left lung base opacity  Workstation performed: [id number]       No results found for this or any previous visit  No results found for this or any previous visit  Results for orders placed during the hospital encounter of 08/31/18   CT abdomen pelvis wo contrast    Narrative CT ABDOMEN AND PELVIS WITHOUT IV CONTRAST    INDICATION:   gross hematuria from ileal conduit/urostomy   "gross hematuria, h/o bladder ca"    COMPARISON:  CT chest on pelvis 8/24/2018  TECHNIQUE:  CT examination of the abdomen and pelvis was performed without intravenous contrast   Axial, sagittal, and coronal 2D reformatted images were created from the source data and submitted for interpretation  Radiation dose length product (DLP) for this visit:  576 mGy-cm   This examination, like all CT scans performed in the [facility], was performed utilizing techniques to minimize radiation dose exposure, including the use of iterative   reconstruction and automated exposure control  Enteric contrast was administered  FINDINGS:    ABDOMEN    LOWER CHEST:  Persistent, partially imaged left pleural effusion  Right-sided effusion is largely resolved  LIVER/BILIARY TREE:  Hepatic cirrhosis  GALLBLADDER:  There are gallstone(s) within the gallbladder, without pericholecystic inflammatory changes  SPLEEN:  Scattered calcified granulomas  PANCREAS:  Unremarkable  ADRENAL GLANDS:  Unremarkable  KIDNEYS/URETERS:  Polycystic kidney disease; no change from May 2014  No hydronephrosis or perinephric collection  STOMACH AND BOWEL:  Colonic diverticulosis  No acute bowel findings  APPENDIX:  No findings to suggest appendicitis  ABDOMINOPELVIC CAVITY:  Right lower quadrant ileal conduit  No apparent complications  VESSELS:  Unremarkable for patient's age  PELVIS    REPRODUCTIVE ORGANS:  Prostatectomy  URINARY BLADDER:  Status post cystectomy      ABDOMINAL WALL/INGUINAL REGIONS:  Previously seen supraumbilical hernia no longer contains bowel  Only a small amount of herniated fat is present within the hernia  OSSEOUS STRUCTURES:  No acute fracture or destructive osseous lesion  Impression Right lower quadrant ileal conduit without complications  Polycystic kidneys unchanged from the prior study  No new renal findings  Persistent partially imaged left basilar effusion and resolved right basilar effusion  Previously described supraumbilical hernia no longer contains a segment of bowel  Workstation performed: [id number]       No results found for this or any previous visit  OBJECTIVE     Current Weight: Weight - Scale: 63 9 kg (140 lb 14 oz)  Vitals:    02/19/19 0330   BP: 156/87   Pulse: 98   Resp: 18   Temp: 97 88 °F (36 6 °C)   SpO2: 98%       Intake/Output Summary (Last 24 hours) at 2/19/2019 1103  Last data filed at 2/19/2019 1050  Gross per 24 hour   Intake 120 ml   Output 2600 ml   Net -2480 ml       PHYSICAL EXAMINATION     Physical Exam   Constitutional: He is oriented to person, place, and time  No distress  Elderly gentleman who does look chronically ill   HENT:   Head: Normocephalic  Dry tongue   Eyes: Pupils are equal, round, and reactive to light  Conjunctivae are normal  No scleral icterus  Neck: Normal range of motion  Neck supple  No JVD present  Cardiovascular: Normal rate  Murmur heard  Pulmonary/Chest: Effort normal  No respiratory distress  He has wheezes  Abdominal: Soft  He exhibits no distension and no mass  There is no tenderness  No hernia  Musculoskeletal: Normal range of motion  He exhibits edema  Neurological: He is alert and oriented to person, place, and time  Skin: Skin is warm  No rash noted  Psychiatric: He has a normal mood and affect  His behavior is normal         PLAN / RECOMMENDATIONS      Stage IV CKD:  Seems to be stable  I do not think is uremic    He does not want to go on dialysis I will keep him comfortable at this point with avoidance of any nephrotoxic medicine if possible    Anemia:  Hemoglobin is stable likely secondary stage IV CKD    Generalized weakness:  Etiology unclear possible cardiomyopathy may be getting worse  Will advised echocardiogram    Lethargic:  Patient does seems to be more awake now but patient wife claims his seems all the time at home again this may be due to cardiomyopathy though patient may be depressed also will need to be monitor    Disposition:  It looks like patient family will need more help at home  Palliative care consult may be important to help them out about future goal of the life    Will continue to monitor    Thank you for the consultation to participate in patient's care  I have personally discussed my plan with the referring physician  Melody Mack MD  Nephrology  2/19/2019        Portions of the record may have been created with voice recognition software  Occasional wrong word or "sound a like" substitutions may have occurred due to the inherent limitations of voice recognition software  Read the chart carefully and recognize, using context, where substitutions have occurred

## 2019-02-19 NOTE — ASSESSMENT & PLAN NOTE
· History of CKD stage 4   · Baseline creatinine around 2 3 To 2 5 range  · Currently appears to be at baseline  · Appreciate nephrology input

## 2019-02-19 NOTE — UTILIZATION REVIEW
Initial Clinical Review    Admission: Date/Time/Statement: 2/18/19 @ 1338   Orders Placed This Encounter   Procedures    Inpatient Admission     Standing Status:   Standing     Number of Occurrences:   1     Order Specific Question:   Admitting Physician     Answer:   MariaT Byrnes [[id number]]     Order Specific Question:   Level of Care     Answer:   Med Surg [16]     Order Specific Question:   Estimated length of stay     Answer:   More than 2 Midnights     Order Specific Question:   Certification     Answer:   I certify that inpatient services are medically necessary for this patient for a duration of greater than two midnights  See H&P and MD Progress Notes for additional information about the patient's course of treatment  ED: Date/Time/Mode of Arrival:   ED Arrival Information     Expected Arrival Acuity Means of Arrival Escorted By Service Admission Type    - 2/18/2019 11:54 Urgent Walk-In Spouse General Medicine Urgent    Arrival Complaint    weakness        Chief Complaint:   Chief Complaint   Patient presents with    Weakness - Generalized     pt states that he feels weak and hasnt been able to get out of bed this past week in the morning     History of Illness: *81 yo male to ED w / worsening weakness , wife states he has been gradually deteriorating    Unable to support himself and having multiple falls     ED Vital Signs:   ED Triage Vitals   Temperature Pulse Respirations Blood Pressure SpO2   02/18/19 1205 02/18/19 1205 02/18/19 1205 02/18/19 1205 02/18/19 1205   98 1 °F (36 7 °C) 69 16 140/78 99 %      Temp Source Heart Rate Source Patient Position - Orthostatic VS BP Location FiO2 (%)   02/18/19 1205 02/18/19 1205 02/18/19 1205 02/18/19 1205 --   Oral Monitor Lying Right arm       Pain Score       02/18/19 1532       No Pain        Wt Readings from Last 1 Encounters:   02/18/19 63 9 kg (140 lb 14 oz)     Vital Signs (abnormal): wnl   Pertinent Labs/Diagnostic Test Results: trop 0 44, BUN creat 33 2 43, direct bili   0 32, alt  9, total prot  8 3, alb  2 7, pt inr  15 69 1 26  CXR -      Moderate left pleural effusion and associated left lung base opacity  CT cervical spine- wnl   CT head - wnl   ED Treatment:   Medication Administration from 02/18/2019 1154 to 02/18/2019 1520       Date/Time Order Dose Route Action Action by Comments     02/18/2019 1340 sodium chloride 0 9 % bolus 500 mL 500 mL Intravenous New Bag Nery Mcneill RN         Past Medical/Surgical History:    Active Ambulatory Problems     Diagnosis Date Noted    Elevated troponin 02/19/2017    [facility] 02/19/2017    H/O carotid angioplasty 02/19/2017    HTN (hypertension) 02/19/2017    History of CHF (congestive heart failure) 02/21/2017    Melena 03/22/2017    SOB (shortness of breath) 03/22/2017    Dizziness 03/22/2017    Chronic systolic congestive heart failure (HCC) 03/22/2017    Atrial fibrillation (HCC) 03/22/2017    CKD (chronic kidney disease) stage 4, GFR 15-29 ml/min ([facility]) 03/22/2017    Anemia 03/23/2017    Hypertensive CKD (chronic kidney disease) 03/23/2017    Status post ileal conduit ([facility] ) 07/13/2017    Generalized weakness 07/13/2017    Urinary tract infection 07/13/2017    History of cerebellar stroke 07/16/2017    Hematuria 07/11/2018    History of CVA (cerebrovascular accident) 07/11/2018    CAD (coronary artery disease) 07/11/2018    Malignant neoplasm of urinary bladder ([facility] ) 90/12/8997    Acute systolic (congestive) heart failure (HCC) 08/24/2018    Chronic kidney disease (CKD), stage IV (severe) ([facility] ) 08/24/2018    Gross hematuria 08/31/2018    Hypokalemia 09/01/2018     Past Medical History:   Diagnosis Date    Atrial fibrillation ([facility] )     CAD (coronary artery disease)     Cancer ([facility] )     Cerebellar stroke, acute ([facility] ) 7/16/2017    CH[facility]congestive heart failure) ([facility])     Chronic kidney disease     COPD (chronic obstructive pulmonary disease) ([facility] )     Hepatitis C     Hypertension     TIA (transient ischemic attack)      Admitting Diagnosis: Dehydration [E86 0]  Weakness generalized [R53 1]  Weakness [R53 1]  Age/Sex: 80 y o  male  Assessment/Plan: * Generalized weakness  Assessment & Plan  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, ileal conduit, presented with complaining of generalized weakness  As per wife patient has been gradually deteriorating  On my encounter patient is not in acute distress  Frail,but nontoxic appearing  Awake, alert, oriented to person only  O2 saturation 97% on room air  Denies any complaints,Other than feeling cold  Denies chest pain, dyspnea, cough, abdominal pain, diarrhea  No significant new abnormalities noted  Ileal conduit bag noted with clear urine  No fever, no leukocytosis noted  Chest x-ray report noted for left lower lobe effusion, opacity    Likely viral etiology vs worsening of dementia   Follow-up respiratory pathogen  IV hydration  Monitor off antibiotics since no signs of active bacterial infection present  Follow-up procalcitonin  Thyroid studies  Continue supportive care  PTOT eval  Palliative care consult   Chronic kidney disease (CKD), stage IV (severe) (HCC)  Assessment & Plan  History of CKD stage 4  Patient follows up with Dr Aylin Gordon outpatient  Baseline creatinine around 2 3 To 2 5 range  Currently appears to be at baseline  Stable   CAD (coronary artery disease)  Assessment & Plan  History of CAD status post CABG  Denies any new complaints  Not in acute distress  Continue aspirin and beta-blocker  Patient follows up with Dr Omar Adams outpatient   Atrial fibrillation [facility])  Assessment & Plan  History of AFib on beta-blocker  Currently not on any anticoagulation due to hematuria in the past    History of CHF (congestive heart failure)  Assessment & Plan  History of CHF    Echo in August 2018 shows EF of 40-45%  Currently on Lasix 40 mg b i d  Wife reports that patient has not been taking diuretics for last couple days  Chest x-ray noted for increased vascular congestion, left effusion  Does not appear to be in acute exacerbation  No signs or symptoms of acute respiratory distress  Will give IV Lasix 40 b i d  While inpatient  Low-salt diet  Continue I&O monitoring   Elevated troponin  Assessment & Plan  Noted 0 44  EKG shows Afib with artifacts  Patient currently denies chest pain, dyspnea, nausea, diaphoreses  Denies any complaints  Likely non ischemic etiology in the settings of CKD stage 4  Trend troponin  Continue supportive care  Anticipated Length of Stay:  Patient will be admitted on an Inpatient basis with an anticipated length of stay of   2 midnights     Justification for Hospital Stay:  Weakness      Admission Orders:  Scheduled Meds:   Current Facility-Administered Medications:  aspirin 81 mg Oral Daily    carvedilol 6 25 mg Oral BID With Meals    furosemide 40 mg Intravenous Q12H    heparin (porcine) 5,000 Units Subcutaneous Q12H [address]      Palliative care   Cardiac diet    I&O   Tele   OT PT eval   Up w/ assist   nephrology consult   Serial trop   #1  0 44   #2  0 38  #3  0 23  2/19 CBC   H&H  11 6  37 3  BMP BUN creat   34 2 50  Echo

## 2019-02-19 NOTE — CONSULTS
Consultation - Palliative and Supportive Care   Kp Hall 80 y o  male [phone]    Assessment:     Patient Active Problem List   Diagnosis    Elevated troponin    [facility]    H/O carotid angioplasty    HTN (hypertension)    History of CHF (congestive heart failure)    Melena    SOB (shortness of breath)    Dizziness    Chronic systolic congestive heart failure (HCC)    Atrial fibrillation (HCC)    CKD (chronic kidney disease) stage 4, GFR 15-29 ml/min (HCC)    Anemia    Hypertensive CKD (chronic kidney disease)    Status post ileal conduit (HCC)    Generalized weakness    Urinary tract infection    History of cerebellar stroke    Hematuria    History of CVA (cerebrovascular accident)    CAD (coronary artery disease)    Malignant neoplasm of urinary bladder (HCC)    Acute systolic (congestive) heart failure (HCC)    Chronic kidney disease (CKD), stage IV (severe) ([facility]    Gross hematuria    Hypokalemia         Plan:  1  Symptom management -   - added marinol 2 5mg BID for appetite  - PRN tylenol for pain  - consulted  for Buddhist support at wife's request    Recommend global delirium precautions including:      - Establishment of day/night cycle via lights during the day and blinds open  Please limit interruptions at night as medically appropriate  - Provide glasses/hearing aids as apprioriate  - Minimize deliriogenic meds as able  - Provide reorientation including date on board and visible clock  - Avoid restraints as able, frequent verbal reorientations or patient care sitter as appropriate  2  Goals - Long (>50 minutes) goals of care conversation between individual conversation with patient and again with patient and spouse  Spouse had many questions regarding hospice services and what that would entail which I answered    We discussed Kp's overall functional decline, his poor appetite (reported 10% meal completion over >1 week), his evidenced muscle wasting on physical exam, his desire, his functional status (ECOG 3 - bedbound most of the day), his weight loss (at least 13lbs over the past 6 months or approx  9%), and his desire to be home and not in the hospital, I would certify him for hospice based on a diagnosis of severe protein calorie malnutrition  His wife would strongly consider this and cites a belief that he is entering his last stage of life  She endorses a strong annika background and the patient clearly states that he is ready to die if it's his time  Nevertheless his wife would like a day or two to see if there is a reversible cause of his overall decline and I supported her in this  Confirmed a desire to avoid chest compressions and continued level 3 status  Code Status:  Level 3      I have reviewed the patient's controlled substance dispensing history in the Prescription Drug Monitoring Program in compliance with the [facility] regulations before prescribing any controlled substances  We appreciate the invitation to be involved in this patient's care  We will continue to follow  Please do not hesitate to reach our on call provider through our clinic answering service at  should you have acute symptom control concerns  IDENTIFICATION:  Inpatient consult to Palliative Care  Consult performed by: Isamar Marley MD  Consult ordered by: Liya Saunders MD        Physician Requesting Consult: Yoselin Paul MD  Reason for Consult / Principal Problem: assist with goals of care, appetite  Hx and PE limited by: patient with mild dementia    HISTORY OF PRESENT ILLNESS:       Mk Gallegos is a 80 y o  male who presents with history of dementia, bladder cancer, coronary artery disease s/p CABG, atrial fibrillation not on anticoagulation, systolic congestive heart failure, CKD IV, and previous CVA  He presented at the behest of his wife for overall weakness and failure to thrive  Patient has had a marked decline over the past few years including multiple readmissions in the past 6 months  Functional status and appetite as above  He had not eaten much for over a week and was unable to take care of himself or be taken care of at home thus wife brought him in  CXR revealed effusions but overall patient's workup has not yielded a clear diagnosis for decline  Regardless patient himself is a poor historian but very clearly able to speak about his healthcare in broad strokes as well as assent and dissent regarding workup and hospital cares  I spoke with patient first who requested a sip of coffee and wanted to leave the hospital ASAP  He identifies his only major problem as a lack of appetite  Then spoke with patient and spouse  Goals of care conversation as above  Review of Systems   Constitution: Positive for decreased appetite, malaise/fatigue and weight loss  Cardiovascular: Negative for chest pain  Respiratory: Negative for shortness of breath  Endocrine: Positive for cold intolerance  Gastrointestinal: Negative for bloating and abdominal pain  Neurological: Positive for weakness  Psychiatric/Behavioral: Positive for memory loss  All other systems reviewed and are negative  Past Medical History:   Diagnosis Date    [facility]     CAD (coronary artery disease)     Cancer (HCC)     bladder    [facility], [facility] 7/16/2017    CHF (congestive heart failure) (HCC)     Chronic kidney disease     COPD (chronic obstructive pulmonary disease) (HCC)     Hepatitis C     Hypertension     TIA (transient ischemic attack)      Past Surgical History:   Procedure Laterality Date    CAROTID ARTERY ANGIOPLASTY      COLONOSCOPY N/A 3/24/2017    Procedure: COLONOSCOPY;  Surgeon: Phoenix Saldana MD;  Location: [facility];   Service:     CORONARY ARTERY BYPASS GRAFT      CYSTECTOMY      ESOPHAGOGASTRODUODENOSCOPY N/A 3/23/2017    Procedure: ESOPHAGOGASTRODUODENOSCOPY (EGD); Surgeon: Oz Falcon MD;  Location: [facility];   Service:    Karen Courser EYE SURGERY      ILEO CONDUIT       Social History     Socioeconomic History    Marital status: /Civil Union     Spouse name: Not on file    Number of children: Not on file    Years of education: Not on file    Highest education level: Not on file   Occupational History    Occupation: retired   Social Needs    Financial resource strain: Not on file    Food insecurity:     Worry: Not on file     Inability: Not on file   MollyWatr needs:     Medical: Not on file     Non-medical: Not on file   Tobacco Use    Smoking status: Former Smoker     Packs/day: 1 00     Years: 5 00     Pack years: 5 00     Types: Cigarettes     Last attempt to quit: 1973     Years since quittin 0    Smokeless tobacco: Former User   Substance and Sexual Activity    Alcohol use: Not Currently    Drug use: No    Sexual activity: Never   Lifestyle    Physical activity:     Days per week: Not on file     Minutes per session: Not on file    Stress: Not on file   Relationships    Social connections:     Talks on phone: Not on file     Gets together: Not on file     Attends Sabianism service: Not on file     Active member of club or organization: Not on file     Attends meetings of clubs or organizations: Not on file     Relationship status: Not on file    Intimate partner violence:     Fear of current or ex partner: Not on file     Emotionally abused: Not on file     Physically abused: Not on file     Forced sexual activity: Not on file   Other Topics Concern    Not on file   Social History Narrative    Not on file     Family History   Problem Relation Age of Onset    Coronary artery disease Mother     Lung disease Father     Heart disease Brother        MEDICATIONS / ALLERGIES:    all current active meds have been reviewed and current meds:   Current Facility-Administered Medications   Medication Dose Route Frequency    acetaminophen (TYLENOL) tablet 975 mg  975 mg Oral Q8H PRN    aspirin (ECOTRIN LOW STRENGTH) EC tablet 81 mg  81 mg Oral Daily    carvedilol (COREG) tablet 6 25 mg  6 25 mg Oral BID With Meals    dronabinol (MARINOL) capsule 2 5 mg  2 5 mg Oral BID before lunch/dinner    furosemide (LASIX) injection 40 mg  40 mg Intravenous Q12H    heparin (porcine) subcutaneous injection 5,000 Units  5,000 Units Subcutaneous Q12H [address]       No Known Allergies    OBJECTIVE:    Physical Exam  Physical Exam   Constitutional: No distress  Frail and thin appearing   HENT:   Head: Atraumatic  Right Ear: External ear normal    Left Ear: External ear normal    Bitemporal wasting   Eyes: Right eye exhibits no discharge  Left eye exhibits no discharge  Cardiovascular: Normal rate  Pulmonary/Chest: Effort normal    Abdominal: Soft  He exhibits no distension  Musculoskeletal: Edema: trace ankle  Neurological: He is alert  Skin: Skin is warm and dry  He is not diaphoretic  Psychiatric:   Pleasant, confused   Nursing note and vitals reviewed  Lab Results:   I have personally reviewed pertinent labs  , CBC:   Lab Results   Component Value Date    WBC 6 07 02/19/2019    HGB 11 6 (L) 02/19/2019    HCT 37 3 02/19/2019    MCV 85 02/19/2019     02/19/2019    MCH 26 5 (L) 02/19/2019    MCHC 31 1 (L) 02/19/2019    RDW 15 9 (H) 02/19/2019    MPV 10 9 02/19/2019    NRBC 0 02/19/2019   , CMP:   Lab Results   Component Value Date    SODIUM 138 02/19/2019    K 4 2 02/19/2019     02/19/2019    CO2 29 02/19/2019    BUN 34 (H) 02/19/2019    CREATININE 2 50 (H) 02/19/2019    CALCIUM 8 9 02/19/2019    EGFR 22 02/19/2019     Imaging Studies: I personally reviewed relevant imaging including CT scans and CXR  EKG, Pathology, and Other Studies: Reviewed previous holter monitor study  Prevous ECHO reviewed  Current ECHO pending       Counseling / Coordination of Care  Total floor / unit time spent today 75+ minutes  Greater than 50% of total time was spent with the patient and / or family counseling and / or coordination of care  A description of the counseling / coordination of care: goals of care discussion, symptom assessment, med review, patient/family meeting, discussion with IM team, discusion with CM

## 2019-02-20 ENCOUNTER — APPOINTMENT (INPATIENT)
Dept: NON INVASIVE DIAGNOSTICS | Facility: HOSPITAL | Age: 84
DRG: 947 | End: 2019-02-20
Payer: MEDICARE

## 2019-02-20 ENCOUNTER — APPOINTMENT (INPATIENT)
Dept: CT IMAGING | Facility: HOSPITAL | Age: 84
DRG: 947 | End: 2019-02-20
Payer: MEDICARE

## 2019-02-20 LAB
BACTERIA UR CULT: ABNORMAL
GLUCOSE SERPL-MCNC: 130 MG/DL (ref 65–140)
GLUCOSE SERPL-MCNC: 202 MG/DL (ref 65–140)

## 2019-02-20 PROCEDURE — 99232 SBSQ HOSP IP/OBS MODERATE 35: CPT | Performed by: PHYSICIAN ASSISTANT

## 2019-02-20 PROCEDURE — 93306 TTE W/DOPPLER COMPLETE: CPT | Performed by: INTERNAL MEDICINE

## 2019-02-20 PROCEDURE — 99232 SBSQ HOSP IP/OBS MODERATE 35: CPT | Performed by: INTERNAL MEDICINE

## 2019-02-20 PROCEDURE — 71250 CT THORAX DX C-: CPT

## 2019-02-20 PROCEDURE — 93306 TTE W/DOPPLER COMPLETE: CPT

## 2019-02-20 PROCEDURE — 82948 REAGENT STRIP/BLOOD GLUCOSE: CPT

## 2019-02-20 PROCEDURE — 74176 CT ABD & PELVIS W/O CONTRAST: CPT

## 2019-02-20 RX ORDER — POTASSIUM CHLORIDE 20 MEQ/1
20 TABLET, EXTENDED RELEASE ORAL 2 TIMES DAILY
Status: DISCONTINUED | OUTPATIENT
Start: 2019-02-20 | End: 2019-02-21

## 2019-02-20 RX ORDER — CLOTRIMAZOLE 1 %
CREAM (GRAM) TOPICAL 2 TIMES DAILY
Status: DISCONTINUED | OUTPATIENT
Start: 2019-02-20 | End: 2019-02-20

## 2019-02-20 RX ADMIN — POTASSIUM CHLORIDE 20 MEQ: 1500 TABLET, EXTENDED RELEASE ORAL at 18:15

## 2019-02-20 RX ADMIN — FUROSEMIDE 40 MG: 10 INJECTION, SOLUTION INTRAMUSCULAR; INTRAVENOUS at 18:17

## 2019-02-20 RX ADMIN — ASPIRIN 81 MG: 81 TABLET, COATED ORAL at 10:59

## 2019-02-20 RX ADMIN — DRONABINOL 2.5 MG: 2.5 CAPSULE ORAL at 11:00

## 2019-02-20 RX ADMIN — FUROSEMIDE 40 MG: 10 INJECTION, SOLUTION INTRAMUSCULAR; INTRAVENOUS at 05:07

## 2019-02-20 RX ADMIN — HEPARIN SODIUM 5000 UNITS: 5000 INJECTION, SOLUTION INTRAVENOUS; SUBCUTANEOUS at 10:58

## 2019-02-20 RX ADMIN — CARVEDILOL 6.25 MG: 6.25 TABLET, FILM COATED ORAL at 18:17

## 2019-02-20 RX ADMIN — DRONABINOL 2.5 MG: 2.5 CAPSULE ORAL at 18:21

## 2019-02-20 RX ADMIN — MICONAZOLE NITRATE 1 APPLICATION: 2 CREAM TOPICAL at 18:17

## 2019-02-20 RX ADMIN — HEPARIN SODIUM 5000 UNITS: 5000 INJECTION, SOLUTION INTRAVENOUS; SUBCUTANEOUS at 21:55

## 2019-02-20 NOTE — PLAN OF CARE
Problem: DISCHARGE PLANNING - CARE MANAGEMENT  Goal: Discharge to post-acute care or home with appropriate resources  Description  INTERVENTIONS:  - Conduct assessment to determine patient/family and health care team treatment goals, and need for post-acute services based on payer coverage, community resources, and patient preferences, and barriers to discharge  - Address psychosocial, clinical, and financial barriers to discharge as identified in assessment in conjunction with the patient/family and health care team  - Arrange appropriate level of post-acute services according to patient?s   needs and preference and payer coverage in collaboration with the physician and health care team  - Communicate with and update the patient/family, physician, and health care team regarding progress on the discharge plan  - Arrange appropriate transportation to post-acute venues  - Pt for STR vs  Home with Hospice  PT/OT consults pending    Outcome: Progressing

## 2019-02-20 NOTE — PROGRESS NOTES
NEPHROLOGY PROGRESS NOTE    Patient: Diego Parikh               Sex: male          DOA: 2/18/2019 11:56 AM   YOB: 1930        Age:  80 y o         LOS:  LOS: 2 days       HPI     Patient with stage IV CKD admitted with overall not feeling well and feeling quite weak    SUBJECTIVE     Claims to be feeling slightly better  Appetite is back with marinol  Still feeling weak and tired  Does not do much activity  CURRENT MEDICATIONS       Current Facility-Administered Medications:     acetaminophen (TYLENOL) tablet 975 mg, 975 mg, Oral, Q8H PRN, Nilesh Casanova MD    aspirin (ECOTRIN LOW STRENGTH) EC tablet 81 mg, 81 mg, Oral, Daily, Chaz MCCORMICK MD, 81 mg at 02/19/19 0838    carvedilol (COREG) tablet 6 25 mg, 6 25 mg, Oral, BID With Meals, Shira MCCORMICK MD, 6 25 mg at 02/19/19 1714    dronabinol (MARINOL) capsule 2 5 mg, 2 5 mg, Oral, BID before lunch/dinner, Nilesh Casanova MD, 2 5 mg at 02/19/19 1611    furosemide (LASIX) injection 40 mg, 40 mg, Intravenous, Q12H, Chaz MCCORMICK MD, 40 mg at 02/20/19 0507    heparin (porcine) subcutaneous injection 5,000 Units, 5,000 Units, Subcutaneous, Q12H [facility], 5,000 Units at 02/19/19 2155 **AND** [CANCELED] Platelet count, , , AM Draw, Chaz MCCORMICK MD    OBJECTIVE     Current Weight: Weight - Scale: 63 9 kg (140 lb 14 oz)  Vitals:    02/20/19 0903   BP: 119/60   Pulse:    Resp:    Temp:    SpO2:        Intake/Output Summary (Last 24 hours) at 2/20/2019 1043  Last data filed at 2/20/2019 0900  Gross per 24 hour   Intake 480 ml   Output 1750 ml   Net -1270 ml       PHYSICAL EXAMINATION     Physical Exam   Constitutional: He is oriented to person, place, and time  He appears well-developed  No distress  HENT:   Head: Normocephalic  Mouth/Throat: Oropharynx is clear and moist    Eyes: Pupils are equal, round, and reactive to light  Conjunctivae are normal  No scleral icterus  Neck: Neck supple  No JVD present     Cardiovascular: Normal rate, normal heart sounds and intact distal pulses  Pulmonary/Chest: Effort normal and breath sounds normal  No respiratory distress  He has no wheezes  Abdominal: Soft  Bowel sounds are normal  There is no tenderness  Musculoskeletal: Normal range of motion  He exhibits no edema  Neurological: He is alert and oriented to person, place, and time  Skin: Skin is warm  No rash noted  Psychiatric: He has a normal mood and affect  His behavior is normal         LAB RESULTS     Results from last 7 days   Lab Units 02/19/19  1126 02/18/19  1220 02/14/19  1049   WBC Thousand/uL 6 07 6 22 6 57   HEMOGLOBIN g/dL 11 6* 12 3 12 9   HEMATOCRIT % 37 3 40 2 41 8   PLATELETS Thousands/uL 156 151 158   POTASSIUM mmol/L 4 2 4 4 4 7   CHLORIDE mmol/L 102 103 101   CO2 mmol/L 29 26 28   BUN mg/dL 34* 33* 35*   CREATININE mg/dL 2 50* 2 43* 2 56*   EGFR ml/min/1 73sq m 22 23 21   CALCIUM mg/dL 8 9 9 0 9 0   MAGNESIUM mg/dL 2 1  --   --    PHOSPHORUS mg/dL  --   --  2 7       RADIOLOGY RESULTS      No results found for this or any previous visit  Results for orders placed during the hospital encounter of 02/18/19   XR chest pa & lateral    Narrative CHEST     INDICATION:   weakness  COMPARISON:  8/31/2018    EXAM PERFORMED/VIEWS:  XR CHEST PA & LATERAL      FINDINGS:  Median sternotomy wires are present  Cardiomediastinal silhouette is stable  Moderate left pleural effusion is identified  Associated left lung base opacity is present  Osseous structures appear within normal limits for patient age  Impression Moderate left pleural effusion and associated left lung base opacity  Workstation performed: [id number]       No results found for this or any previous visit  No results found for this or any previous visit    Results for orders placed during the hospital encounter of 08/31/18   CT abdomen pelvis wo contrast    Narrative CT ABDOMEN AND PELVIS WITHOUT IV CONTRAST    INDICATION:   gross hematuria from ileal conduit/urostomy   "gross hematuria, h/o bladder ca"    COMPARISON:  CT chest on pelvis 8/24/2018  TECHNIQUE:  CT examination of the abdomen and pelvis was performed without intravenous contrast   Axial, sagittal, and coronal 2D reformatted images were created from the source data and submitted for interpretation  Radiation dose length product (DLP) for this visit:  576 mGy-cm   This examination, like all CT scans performed in the [facility], was performed utilizing techniques to minimize radiation dose exposure, including the use of iterative   reconstruction and automated exposure control  Enteric contrast was administered  FINDINGS:    ABDOMEN    LOWER CHEST:  Persistent, partially imaged left pleural effusion  Right-sided effusion is largely resolved  LIVER/BILIARY TREE:  Hepatic cirrhosis  GALLBLADDER:  There are gallstone(s) within the gallbladder, without pericholecystic inflammatory changes  SPLEEN:  Scattered calcified granulomas  PANCREAS:  Unremarkable  ADRENAL GLANDS:  Unremarkable  KIDNEYS/URETERS:  Polycystic kidney disease; no change from May 2014  No hydronephrosis or perinephric collection  STOMACH AND BOWEL:  Colonic diverticulosis  No acute bowel findings  APPENDIX:  No findings to suggest appendicitis  ABDOMINOPELVIC CAVITY:  Right lower quadrant ileal conduit  No apparent complications  VESSELS:  Unremarkable for patient's age  PELVIS    REPRODUCTIVE ORGANS:  Prostatectomy  URINARY BLADDER:  Status post cystectomy  ABDOMINAL WALL/INGUINAL REGIONS:  Previously seen supraumbilical hernia no longer contains bowel  Only a small amount of herniated fat is present within the hernia  OSSEOUS STRUCTURES:  No acute fracture or destructive osseous lesion  Impression Right lower quadrant ileal conduit without complications  Polycystic kidneys unchanged from the prior study    No new renal findings  Persistent partially imaged left basilar effusion and resolved right basilar effusion  Previously described supraumbilical hernia no longer contains a segment of bowel  Workstation performed: [id number]       No results found for this or any previous visit  PLAN / RECOMMENDATIONS      CKD stage 4:  Seems to be stable at this point though no blood work today  Patient is refusing  Discussed with the patient to allow us to blood works we can monitor his kidney function    Anemia:  Stable with CKD    Generalized weakness:  Possible due to cardiomyopathy echo done today to assess cardiac function  Disposition:  Seen by palliative care doctor  Will continue to monitor    Chaya Schaffer MD  Nephrology  2/20/2019        Portions of the record may have been created with voice recognition software  Occasional wrong word or "sound a like" substitutions may have occurred due to the inherent limitations of voice recognition software  Read the chart carefully and recognize, using context, where substitutions have occurred

## 2019-02-20 NOTE — PLAN OF CARE
Problem: Potential for Falls  Goal: Patient will remain free of falls  Description  INTERVENTIONS:  - Assess patient frequently for physical needs  -  Identify cognitive and physical deficits and behaviors that affect risk of falls    -  [place] fall precautions as indicated by assessment   - Educate patient/family on patient safety including physical limitations  - Instruct patient to call for assistance with activity based on assessment  - Modify environment to reduce risk of injury  - Consider OT/PT consult to assist with strengthening/mobility  Outcome: Progressing     Problem: PAIN - ADULT  Goal: Verbalizes/displays adequate comfort level or baseline comfort level  Description  Interventions:  - Encourage patient to monitor pain and request assistance  - Assess pain using appropriate pain scale  - Administer analgesics based on type and severity of pain and evaluate response  - Implement non-pharmacological measures as appropriate and evaluate response  - Consider cultural and social influences on pain and pain management  - Notify physician/advanced practitioner if interventions unsuccessful or patient reports new pain  Outcome: Progressing     Problem: INFECTION - ADULT  Goal: Absence or prevention of progression during hospitalization  Description  INTERVENTIONS:  - Assess and monitor for signs and symptoms of infection  - Monitor lab/diagnostic results  - Monitor all insertion sites, i e  indwelling lines, tubes, and drains  - Monitor endotracheal (as able) and nasal secretions for changes in amount and color  - [place] appropriate cooling/warming therapies per order  - Administer medications as ordered  - Instruct and encourage patient and family to use good hand hygiene technique  - Identify and instruct in appropriate isolation precautions for identified infection/condition  Outcome: Progressing  Goal: Absence of fever/infection during neutropenic period  Description  INTERVENTIONS:  - Monitor WBC  - Implement neutropenic guidelines  Outcome: Progressing     Problem: SAFETY ADULT  Goal: Maintain or return to baseline ADL function  Description  INTERVENTIONS:  -  Assess patient's ability to carry out ADLs; assess patient's baseline for ADL function and identify physical deficits which impact ability to perform ADLs (bathing, care of mouth/teeth, toileting, grooming, dressing, etc )  - Assess/evaluate cause of self-care deficits   - Assess range of motion  - Assess patient's mobility; develop plan if impaired  - Assess patient's need for assistive devices and provide as appropriate  - Encourage maximum independence but intervene and supervise when necessary  ¯ Involve family in performance of ADLs  ¯ Assess for home care needs following discharge   ¯ Request OT consult to assist with ADL evaluation and planning for discharge  ¯ Provide patient education as appropriate  Outcome: Progressing  Goal: Maintain or return mobility status to optimal level  Description  INTERVENTIONS:  - Assess patient's baseline mobility status (ambulation, transfers, stairs, etc )    - Identify cognitive and physical deficits and behaviors that affect mobility  - Identify mobility aids required to assist with transfers and/or ambulation (gait belt, sit-to-stand, lift, walker, cane, etc )  - [place] fall precautions as indicated by assessment  - Record patient progress and toleration of activity level on Mobility SBAR; progress patient to next Phase/Stage  - Instruct patient to call for assistance with activity based on assessment  - Request Rehabilitation consult to assist with strengthening/weightbearing, etc   Outcome: Progressing     Problem: DISCHARGE PLANNING  Goal: Discharge to home or other facility with appropriate resources  Description  INTERVENTIONS:  - Identify barriers to discharge w/patient and caregiver  - Arrange for needed discharge resources and transportation as appropriate  - Identify discharge learning needs (meds, wound care, etc )  - Arrange for interpretive services to assist at discharge as needed  - Refer to Case Management Department for coordinating discharge planning if the patient needs post-hospital services based on physician/advanced practitioner order or complex needs related to functional status, cognitive ability, or social support system  Outcome: Progressing     Problem: Knowledge Deficit  Goal: Patient/family/caregiver demonstrates understanding of disease process, treatment plan, medications, and discharge instructions  Description  Complete learning assessment and assess knowledge base  Interventions:  - Provide teaching at level of understanding  - Provide teaching via preferred learning methods  Outcome: Progressing     Problem: Prexisting or High Potential for Compromised Skin Integrity  Goal: Skin integrity is maintained or improved  Description  INTERVENTIONS:  - Identify patients at risk for skin breakdown  - Assess and monitor skin integrity  - Assess and monitor nutrition and hydration status  - Monitor labs (i e  albumin)  - Assess for incontinence   - Turn and reposition patient  - Assist with mobility/ambulation  - Relieve pressure over bony prominences  - Avoid friction and shearing  - Provide appropriate hygiene as needed including keeping skin clean and dry  - Evaluate need for skin moisturizer/barrier cream  - Collaborate with interdisciplinary team (i e  Nutrition, Rehabilitation, etc )   - Patient/family teaching  Outcome: Progressing     Problem: Nutrition/Hydration-ADULT  Goal: Nutrient/Hydration intake appropriate for improving, restoring or maintaining nutritional needs  Description  Monitor and assess patient's nutrition/hydration status for malnutrition (ex- brittle hair, bruises, dry skin, pale skin and conjunctiva, muscle wasting, smooth red tongue, and disorientation)  Collaborate with interdisciplinary team and initiate plan and interventions as ordered    Monitor patient's weight and dietary intake as ordered or per policy  Utilize nutrition screening tool and intervene per policy  Determine patient's food preferences and provide high-protein, high-caloric foods as appropriate       INTERVENTIONS:  - Monitor oral intake, urinary output, labs, and treatment plans  - Assess nutrition and hydration status and recommend course of action  - Evaluate amount of meals eaten  - Assist patient with eating if necessary   - Allow adequate time for meals  - Recommend/ encourage appropriate diets, oral nutritional supplements, and vitamin/mineral supplements  - Order, calculate, and assess calorie counts as needed  - Recommend, monitor, and adjust tube feedings  based on assessed needs  - Assess need for intravenous fluids  - Provide nutrition/hydration education as appropriate  - Include patient/family/caregiver in decisions related to nutrition   Outcome: Progressing     Problem: DISCHARGE PLANNING - CARE MANAGEMENT  Goal: Discharge to post-acute care or home with appropriate resources  Description  INTERVENTIONS:  - Conduct assessment to determine patient/family and health care team treatment goals, and need for post-acute services based on payer coverage, community resources, and patient preferences, and barriers to discharge  - Address psychosocial, clinical, and financial barriers to discharge as identified in assessment in conjunction with the patient/family and health care team  - Arrange appropriate level of post-acute services according to patient?s   needs and preference and payer coverage in collaboration with the physician and health care team  - Communicate with and update the patient/family, physician, and health care team regarding progress on the discharge plan  - Arrange appropriate transportation to post-acute venues  Outcome: Progressing

## 2019-02-20 NOTE — PROGRESS NOTES
Progress note - Palliative and Supportive Care   Kp Hall 80 y o  male [id number]    Assessment:     Patient Active Problem List   Diagnosis    Elevated troponin    JUSTINE [facility]acute kidney injury) ([facility] [address] )    H/O carotid angioplasty    HTN (hypertension)    History of CHF (congestive heart failure)    Melena    SOB (shortness of breath)    Dizziness    Chronic systolic congestive heart failure (HCC)    Atrial fibrillation (HCC)    CKD (chronic kidney disease) stage 4, GFR 15-29 ml/min (HCC)    Anemia    Hypertensive CKD (chronic kidney disease)    Status post ileal conduit (HCC)    Generalized weakness    Urinary tract infection    History of cerebellar stroke    Hematuria    History of CVA (cerebrovascular accident)    CAD (coronary artery disease)    Malignant neoplasm of urinary bladder (HCC)    Acute systolic (congestive) heart failure (HCC)    Chronic kidney disease (CKD), stage IV (severe) ([facility] [address] )    Gross hematuria    Hypokalemia         Plan:  1  Symptom management -   - continue marinol 2 5mg BID  Patient already reports some efficacy from this medication    - PRN tylenol for pain    Recommend global delirium precautions including:      - Establishment of day/night cycle via lights during the day and blinds open  Please limit interruptions at night as medically appropriate  - Provide glasses/hearing aids as apprioriate  - Minimize deliriogenic meds as able  - Provide reorientation including date on board and visible clock  - Avoid restraints as able, frequent verbal reorientations or patient care sitter as appropriate  2  Goals - Wife waiting on further workup to determine if there is a reversible cause of his decline (most notably wishes to see ECHO for concern of "too much water on his heart")  Tomorrow morning will make sure to reach out to her and discuss case with SLIM team   If no acute cause suggests itself, patient would be hospice eligible    Hopefully he is eligible for further assistance via VA benefits  Interval history:       Patient reports improvement in appetite with marinol  Wife states he ate an entire meal yesterday afternoon  We talked about some of her concerns going home and I answered questions regarding hospice benefit  She asked about his VA benefits as well and myself and LSW spoke about what usually was entailed with [place] hospice  Also provided emotional support to wife as she had to put down her 12year old dog last night  Patient attempted to sit at bedside to join conversation but was too weak to do so for more than a few seconds  He is still very interested in returning home  MEDICATIONS / ALLERGIES:     all current active meds have been reviewed and current meds:   Current Facility-Administered Medications   Medication Dose Route Frequency    acetaminophen (TYLENOL) tablet 975 mg  975 mg Oral Q8H PRN    aspirin (ECOTRIN LOW STRENGTH) EC tablet 81 mg  81 mg Oral Daily    carvedilol (COREG) tablet 6 25 mg  6 25 mg Oral BID With Meals    dronabinol (MARINOL) capsule 2 5 mg  2 5 mg Oral BID before lunch/dinner    furosemide (LASIX) injection 40 mg  40 mg Intravenous Q12H    heparin (porcine) subcutaneous injection 5,000 Units  5,000 Units Subcutaneous Q12H [address]       No Known Allergies    OBJECTIVE:    Physical Exam  Physical Exam   Constitutional: No distress  Cachectic appearing   HENT:   Head: Atraumatic  Temporal wasting   Pulmonary/Chest: Effort normal  No respiratory distress  Abdominal: He exhibits no distension  Musculoskeletal: He exhibits no edema  Neurological: He is alert  Skin: Skin is warm and dry  He is not diaphoretic  Psychiatric: He has a normal mood and affect  Nursing note and vitals reviewed  Lab Results: I have personally reviewed pertinent labs  , CBC: No results found for: WBC, HGB, HCT, MCV, PLT, ADJUSTEDWBC, MCH, MCHC, RDW, MPV, NRBC, CMP: No results found for: SODIUM, K, CL, CO2, ANIONGAP, BUN, CREATININE, GLUCOSE, CALCIUM, AST, ALT, ALKPHOS, PROT, BILITOT, EGFR    CT scan and TTE pending  Counseling / Coordination of Care  Total floor / unit time spent today 25+ minutes  Greater than 50% of total time was spent with the patient and / or family counseling and / or coordination of care  A description of the counseling / coordination of care: counseling and psychosocial support for patient and wife  Discussion of discharge planning  Symptom assessment

## 2019-02-20 NOTE — SOCIAL WORK
CM met pt and wife Joleen Moreno at bedside and explained role  Pt with history of Dementia and information obtained from wife  Pt lives with his wife in a 2 story house, 14 steps inside, 4 EDUARD  Pt wife assist him with all ADL's  Pt had HHC recently but same was discontinued per wife  Pt is alone often during the day when wife is at work  Wife reports pt stays in bed most of that time  She has some assistance from her children but same is usually minimal  Pt PCP is Francois Millan  He uses [facility]  She denies pt has any history of MH or D&A use  She further denies pt has any history of SNF  Per wife she has had conversation with hospitalist service here about possible hospice care  She is not sure about same at this time but she is considering it  She reports if she chose same she would like pt to come home with home hospice services  She will transport pt home  PT/OT consults are also pending  CM will continue to follow  CM reviewed d/c planning process including the following: identifying help at home, patient preference for d/c planning needs, availability of treatment team to discuss questions or concerns patient and/or family may have regarding understanding medications and recognizing signs and symptoms once discharged  CM also encouraged patient to follow up with all recommended appointments after discharge  Patient advised of importance for patient and family to participate in managing patients medical well being

## 2019-02-20 NOTE — PHYSICIAN ADVISOR
Current patient class: Inpatient  The patient is currently on Hospital Day: 3 at [address]      The patient was admitted to the hospital at [phone] on 2/18/19 for the following diagnosis:  Dehydration [E86 0]  Weakness generalized [R53 1]  Weakness [R53 1]       There is documentation in the medical record of an expected length of stay of at least 2 midnights  The patient is therefore expected to satisfy the 2 midnight benchmark and given the 2 midnight presumption is appropriate for INPATIENT ADMISSION  Given this expectation of a satisfying stay, CMS instructs us that the patient is most often appropriate for inpatient admission under part A provided medical necessity is documented in the chart  After review of the relevant documentation, labs, vital signs and test results, the patient is appropriate for INPATIENT ADMISSION  Admission to the hospital as an inpatient is a complex decision making process which requires the practitioner to consider the patients presenting complaint, history and physical examination and all relevant testing  With this in mind, in this case, the patient was deemed appropriate for INPATIENT ADMISSION  After review of the documentation and testing available at the time of the admission I concur with this clinical determination of medical necessity  Rationale is as follows:     The patient is a 80 yrs old Male who presented to the ED at 2/18/2019 11:56 AM with a chief complaint of Weakness - Generalized (pt states that he feels weak and hasnt been able to get out of bed this past week in the morning)    The patients vitals on arrival were ED Triage Vitals   Temperature Pulse Respirations Blood Pressure SpO2   02/18/19 1205 02/18/19 1205 02/18/19 1205 02/18/19 1205 02/18/19 1205   98 1 °F (36 7 °C) 69 16 140/78 99 %      Temp Source Heart Rate Source Patient Position - Orthostatic VS BP Location FiO2 (%)   02/18/19 1205 02/18/19 1205 02/18/19 1205 02/18/19 1205 --   Oral Monitor Lying Right arm       Pain Score       02/18/19 1532       No Pain           Past Medical History:   Diagnosis Date    Atrial fibrillation (HCC)     CAD (coronary artery disease)     Cancer (HCC)     bladder    Cerebellar stroke, acute ([facility] [address] ) 7/16/2017    CHF (congestive heart failure) (HCC)     Chronic kidney disease     COPD (chronic obstructive pulmonary disease) (HCC)     Hepatitis C     Hypertension     TIA (transient ischemic attack)      Past Surgical History:   Procedure Laterality Date    CAROTID ARTERY ANGIOPLASTY      COLONOSCOPY N/A 3/24/2017    Procedure: COLONOSCOPY;  Surgeon: Phoenix Saldana MD;  Location: [facility]; Service:     CORONARY ARTERY BYPASS GRAFT      CYSTECTOMY      ESOPHAGOGASTRODUODENOSCOPY N/A 3/23/2017    Procedure: ESOPHAGOGASTRODUODENOSCOPY (EGD); Surgeon: Phoenix Saldana MD;  Location: [facility]; Service:     EYE SURGERY      ILEO CONDUIT             Consults have been placed to:   IP CONSULT TO NEPHROLOGY  IP CONSULT TO PALLIATIVE CARE  IP CONSULT TO PASTORAL CARE    Vitals:    02/18/19 1532 02/19/19 0330 02/19/19 0806 02/19/19 1526   BP: 141/94 156/87 142/76    BP Location: Left arm Left arm Right arm    Pulse: 104 98 71    Resp: 18 18 18    Temp: 98 1 °F (36 7 °C) 97 88 °F (36 6 °C) 97 5 °F (36 4 °C)    TempSrc: Oral Oral Oral Oral   SpO2: 97% 98% 100%    Weight:       Height:           Most recent labs:    Recent Labs     02/18/19  1220  02/19/19  1126   WBC 6 22  --  6 07   HGB 12 3  --  11 6*   HCT 40 2  --  37 3     --  156   K 4 4  --  4 2   CALCIUM 9 0  --  8 9   BUN 33*  --  34*   CREATININE 2 43*  --  2 50*   INR 1 26*  --   --    TROPONINI 0 44*   < > 0 23*   AST 24  --   --    ALT 9*  --   --    ALKPHOS 95  --   --     < > = values in this interval not displayed         Scheduled Meds:  Current Facility-Administered Medications:  acetaminophen 975 mg Oral Q8H PRN Billie Dixon MD   aspirin 81 mg Oral Daily Chaz MCCORMICK MD   carvedilol 6 25 mg Oral BID With Meals Chza MCCORMICK MD   dronabinol 2 5 mg Oral BID before lunch/dinner Quinten Taylor MD   furosemide 40 mg Intravenous Q12H Chaz MCCORMICK MD   heparin (porcine) 5,000 Units Subcutaneous Q12H [facility] Chaz MCCORMICK MD     Continuous Infusions:   PRN Meds:   acetaminophen    Surgical procedures (if appropriate):

## 2019-02-20 NOTE — ASSESSMENT & PLAN NOTE
History of CHF  Echo in August 2018 shows EF of 40-45%  Currently on Lasix 40 mg b i d  Wife reported that patient has not been taking diuretics for last couple days  Chest x-ray noted for increased vascular congestion, left effusion  Does not appear to be in acute exacerbation  No signs or symptoms of acute respiratory distress  Low-salt diet  Continue I&O monitoring  Daily weights  Repeat Echo

## 2019-02-20 NOTE — PALLIATIVE CARE CONFERENCE
Palliative team met with pt  And wife today  LSW introduced self and role of palliative social work  Wife expressed sadness she had to put down her 12year old [address] 1965 dog last night  Emotional support provided to her  She stated she came in today "to make sure he gets a bath "  Pt  Was engaged in conversation, however, is forgetful  [facility] is [facility] and [facility]  It seems he may have received Aide and [facility] [address] at one time, however pt  "fired" his aide  Wife would like to receive help at home if possible IF home hospice is decided  They are awaiting cardiac workup to be completed for a more definite picture of his prognosis  Pt  Did not endorse any uncontrolled symptoms at this time  PSc will continue to follow and offer support

## 2019-02-20 NOTE — ASSESSMENT & PLAN NOTE
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, ileal conduit, presented with complaining of generalized weakness  · Patient has been gradually deteriorating with functional decline with failure to thrive and poor appetite  · No fever; no leukocytosis noted  Ileal conduit bag noted with clear urine  Chest x-ray report noted for left lower lobe effusion, opacity  Influenza/RSV negative  TSH normal  Procalcitonin negative  · Follow up urine culture, blood cultures, Echo  · Check CT Chest/Abdomen/Pelvis  · Per palliative care's discussion with wife and my personal discussion, she would like to see how he does over the next day or two with further testing and if not improving/no reversible cause noted, then would want to pursue hospice  · Marinol added to increase appetite

## 2019-02-20 NOTE — PROGRESS NOTES
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, ileal conduit, presented with complaining of generalized weakness  · Patient has been gradually deteriorating with functional decline with failure to thrive and poor appetite  · No fever; no leukocytosis noted  Ileal conduit bag noted with clear urine  Chest x-ray report noted for left lower lobe effusion, opacity  Influenza/RSV negative  TSH normal  Procalcitonin negative  · Follow up urine culture, blood cultures, Echo  · Check CT Chest/Abdomen/Pelvis  · Per palliative care's discussion with wife and my personal discussion, she would like to see how he does over the next day or two with further testing and if not improving/no reversible cause noted, then would want to pursue hospice  · Marinol added to increase appetite  Chronic kidney disease (CKD), stage IV (severe) ([facility])  Assessment & Plan  · History of CKD stage 4   · Baseline creatinine around 2 3 To 2 5 range  · Currently appears to be at baseline  · Appreciate nephrology input  CAD (coronary artery disease)  Assessment & Plan  · History of CAD status post CABG  · Denies any new complaints  · Not in acute distress  · Continue aspirin and beta-blocker  Atrial fibrillation ([facility] [address] )  Assessment & Plan  · History of AFib on beta-blocker  Currently not on any anticoagulation due to hematuria in the past     History of CHF (congestive heart failure)  Assessment & Plan  History of CHF  Echo in August 2018 shows EF of 40-45%  Currently on Lasix 40 mg b i d    Wife reported that patient has not been taking diuretics for last couple days  Chest x-ray noted for increased vascular congestion, left effusion  Does not appear to be in acute exacerbation  No signs or symptoms of acute respiratory distress  Low-salt diet  Continue I&O monitoring  Daily weights  Repeat Echo  Elevated troponin  Assessment & Plan  · Trending down: 0 44/0 38/0  23   · EKG shows Afib with artifacts  · Patient currently denies chest pain, dyspnea, nausea, diaphoreses  Denies any complaints  · Likely non ischemic etiology in the settings of CKD stage 4   · On Telemetry  · Continue supportive care  Addendum - also, wife noted return of yeast/fungal rash of glans of penis which he previously received a cream for  Examined area- erythema consistent with yeast/fungal infection  Spoke to pharmacy  Miconazole cream added  VTE Pharmacologic Prophylaxis:   Pharmacologic: Heparin  Mechanical VTE Prophylaxis in Place: Yes    Patient Centered Rounds: I have performed bedside rounds with nursing staff today  Discussions with Specialists or Other Care Team Provider: I discussed with palliative care and nephrology  Education and Discussions with Family / Patient: Discussed with patient's wife  Time Spent for Care: 45 minutes  More than 50% of total time spent on counseling and coordination of care as described above  Current Length of Stay: 2 day(s)    Current Patient Status: Inpatient   Certification Statement: The patient will continue to require additional inpatient hospital stay due to further evaluation of above  Discharge Plan: Not medically cleared  Code Status: Level 3 - DNAR and DNI      Subjective:   Patient continues with weakness  Denies SOB or CP  Wife did note that the appetite may be improving with the addition of the Marinol  No fevers or chills      Objective:     Vitals:   Temp (24hrs), Av 4 °F (36 3 °C), Min:97 2 °F (36 2 °C), Max:97 5 °F (36 4 °C)    Temp:  [97 2 °F (36 2 °C)-97 5 °F (36 4 °C)] 97 5 °F (36 4 °C)  HR:  [63-69] 63  Resp:  [18] 18  BP: (119-124)/(60-75) 119/60  SpO2:  [91 %-98 %] 98 %  Body mass index is 23 44 kg/m²  Input and Output Summary (last 24 hours): Intake/Output Summary (Last 24 hours) at 2/20/2019 1410  Last data filed at 2/20/2019 0900  Gross per 24 hour   Intake 480 ml   Output 1450 ml   Net -970 ml       Physical Exam:     Physical Exam   Constitutional:   Cachectic and chronically ill appearing  HENT:   Head: Normocephalic and atraumatic  Eyes: No scleral icterus  Cardiovascular: Normal rate  S1S2  Pulmonary/Chest: No respiratory distress  He has no wheezes  He has no rales  Abdominal: Soft  Bowel sounds are normal  He exhibits no distension  There is no tenderness  Musculoskeletal: He exhibits no edema  Neurological:   A0x2  Additional Data:     Labs:    Results from last 7 days   Lab Units 02/19/19  1126   WBC Thousand/uL 6 07   HEMOGLOBIN g/dL 11 6*   HEMATOCRIT % 37 3   PLATELETS Thousands/uL 156   NEUTROS PCT % 66   LYMPHS PCT % 18   MONOS PCT % 13*   EOS PCT % 2     Results from last 7 days   Lab Units 02/19/19  1126 02/18/19  1220   POTASSIUM mmol/L 4 2 4 4   CHLORIDE mmol/L 102 103   CO2 mmol/L 29 26   BUN mg/dL 34* 33*   CREATININE mg/dL 2 50* 2 43*   CALCIUM mg/dL 8 9 9 0   ALK PHOS U/L  --  95   ALT U/L  --  9*   AST U/L  --  24     Results from last 7 days   Lab Units 02/18/19  1220   INR  1 26*       * I Have Reviewed All Lab Data Listed Above  * Additional Pertinent Lab Tests Reviewed: All Labs Within Last 24 Hours Reviewed    Imaging:    Imaging Reports Reviewed Today Include: Awaiting echo and CT        Last 24 Hours Medication List:     Current Facility-Administered Medications:  acetaminophen 975 mg Oral Q8H PRN Kang San MD   aspirin 81 mg Oral Daily Chaz MCCORMICK MD   carvedilol 6 25 mg Oral BID With Meals Saira MCCORMICK MD   dronabinol 2 5 mg Oral BID before lunch/dinner Kang San MD   furosemide 40 mg Intravenous Q12H Chaz MCCORMICK MD   heparin (porcine) 5,000 Units Subcutaneous Q12H [facility] Sylvie Humphrey MD        Today, Patient Was Seen By: Concepcion Calderon PA-C    ** Please Note: Dictation voice to text software may have been used in the creation of this document   **

## 2019-02-21 VITALS
SYSTOLIC BLOOD PRESSURE: 138 MMHG | RESPIRATION RATE: 18 BRPM | BODY MASS INDEX: 23.4 KG/M2 | HEART RATE: 83 BPM | HEIGHT: 65 IN | OXYGEN SATURATION: 99 % | DIASTOLIC BLOOD PRESSURE: 77 MMHG | TEMPERATURE: 97.16 F | WEIGHT: 140.43 LBS

## 2019-02-21 LAB
ANION GAP SERPL CALCULATED.3IONS-SCNC: 7 MMOL/L (ref 4–13)
BASOPHILS # BLD AUTO: 0.02 THOUSANDS/ΜL (ref 0–0.1)
BASOPHILS NFR BLD AUTO: 0 % (ref 0–1)
BUN SERPL-MCNC: 51 MG/DL (ref 5–25)
CALCIUM SERPL-MCNC: 8.9 MG/DL (ref 8.3–10.1)
CHLORIDE SERPL-SCNC: 104 MMOL/L (ref 100–108)
CO2 SERPL-SCNC: 29 MMOL/L (ref 21–32)
CREAT SERPL-MCNC: 2.81 MG/DL (ref 0.6–1.3)
EOSINOPHIL # BLD AUTO: 0.16 THOUSAND/ΜL (ref 0–0.61)
EOSINOPHIL NFR BLD AUTO: 3 % (ref 0–6)
ERYTHROCYTE [DISTWIDTH] IN BLOOD BY AUTOMATED COUNT: 15.7 % (ref 11.6–15.1)
GFR SERPL CREATININE-BSD FRML MDRD: 19 ML/MIN/1.73SQ M
GLUCOSE SERPL-MCNC: 153 MG/DL (ref 65–140)
GLUCOSE SERPL-MCNC: 96 MG/DL (ref 65–140)
HCT VFR BLD AUTO: 37.9 % (ref 36.5–49.3)
HGB BLD-MCNC: 11.8 G/DL (ref 12–17)
IMM GRANULOCYTES # BLD AUTO: 0.01 THOUSAND/UL (ref 0–0.2)
IMM GRANULOCYTES NFR BLD AUTO: 0 % (ref 0–2)
LYMPHOCYTES # BLD AUTO: 1.36 THOUSANDS/ΜL (ref 0.6–4.47)
LYMPHOCYTES NFR BLD AUTO: 27 % (ref 14–44)
MCH RBC QN AUTO: 26.4 PG (ref 26.8–34.3)
MCHC RBC AUTO-ENTMCNC: 31.1 G/DL (ref 31.4–37.4)
MCV RBC AUTO: 85 FL (ref 82–98)
MONOCYTES # BLD AUTO: 0.76 THOUSAND/ΜL (ref 0.17–1.22)
MONOCYTES NFR BLD AUTO: 15 % (ref 4–12)
NEUTROPHILS # BLD AUTO: 2.73 THOUSANDS/ΜL (ref 1.85–7.62)
NEUTS SEG NFR BLD AUTO: 55 % (ref 43–75)
NRBC BLD AUTO-RTO: 0 /100 WBCS
PLATELET # BLD AUTO: 156 THOUSANDS/UL (ref 149–390)
PMV BLD AUTO: 10.9 FL (ref 8.9–12.7)
POTASSIUM SERPL-SCNC: 4.1 MMOL/L (ref 3.5–5.3)
RBC # BLD AUTO: 4.47 MILLION/UL (ref 3.88–5.62)
SODIUM SERPL-SCNC: 140 MMOL/L (ref 136–145)
TROPONIN I SERPL-MCNC: 0.08 NG/ML
WBC # BLD AUTO: 5.04 THOUSAND/UL (ref 4.31–10.16)

## 2019-02-21 PROCEDURE — 99222 1ST HOSP IP/OBS MODERATE 55: CPT | Performed by: INTERNAL MEDICINE

## 2019-02-21 PROCEDURE — 99238 HOSP IP/OBS DSCHRG MGMT 30/<: CPT | Performed by: FAMILY MEDICINE

## 2019-02-21 PROCEDURE — G8978 MOBILITY CURRENT STATUS: HCPCS

## 2019-02-21 PROCEDURE — 85025 COMPLETE CBC W/AUTO DIFF WBC: CPT | Performed by: PHYSICIAN ASSISTANT

## 2019-02-21 PROCEDURE — 93005 ELECTROCARDIOGRAM TRACING: CPT

## 2019-02-21 PROCEDURE — 80048 BASIC METABOLIC PNL TOTAL CA: CPT | Performed by: PHYSICIAN ASSISTANT

## 2019-02-21 PROCEDURE — 84484 ASSAY OF TROPONIN QUANT: CPT | Performed by: FAMILY MEDICINE

## 2019-02-21 PROCEDURE — 99232 SBSQ HOSP IP/OBS MODERATE 35: CPT | Performed by: INTERNAL MEDICINE

## 2019-02-21 PROCEDURE — G8979 MOBILITY GOAL STATUS: HCPCS

## 2019-02-21 PROCEDURE — 97163 PT EVAL HIGH COMPLEX 45 MIN: CPT

## 2019-02-21 PROCEDURE — 82948 REAGENT STRIP/BLOOD GLUCOSE: CPT

## 2019-02-21 RX ORDER — ATORVASTATIN CALCIUM 40 MG/1
40 TABLET, FILM COATED ORAL
Qty: 30 TABLET | Refills: 0 | Status: SHIPPED | OUTPATIENT
Start: 2019-02-21

## 2019-02-21 RX ORDER — FUROSEMIDE 40 MG/1
40 TABLET ORAL DAILY
Status: DISCONTINUED | OUTPATIENT
Start: 2019-02-21 | End: 2019-02-21 | Stop reason: HOSPADM

## 2019-02-21 RX ORDER — ATORVASTATIN CALCIUM 40 MG/1
40 TABLET, FILM COATED ORAL
Status: DISCONTINUED | OUTPATIENT
Start: 2019-02-21 | End: 2019-02-21 | Stop reason: HOSPADM

## 2019-02-21 RX ORDER — CARVEDILOL 3.12 MG/1
3.12 TABLET ORAL 2 TIMES DAILY WITH MEALS
Status: DISCONTINUED | OUTPATIENT
Start: 2019-02-21 | End: 2019-02-21

## 2019-02-21 RX ORDER — POTASSIUM CHLORIDE 20 MEQ/1
20 TABLET, EXTENDED RELEASE ORAL DAILY
Status: DISCONTINUED | OUTPATIENT
Start: 2019-02-22 | End: 2019-02-21 | Stop reason: HOSPADM

## 2019-02-21 RX ORDER — DRONABINOL 2.5 MG/1
2.5 CAPSULE ORAL
Qty: 60 CAPSULE | Refills: 0 | Status: SHIPPED | OUTPATIENT
Start: 2019-02-21 | End: 2019-03-03

## 2019-02-21 RX ADMIN — MICONAZOLE NITRATE 1 APPLICATION: 2 CREAM TOPICAL at 09:46

## 2019-02-21 RX ADMIN — ASPIRIN 81 MG: 81 TABLET, COATED ORAL at 09:39

## 2019-02-21 RX ADMIN — FUROSEMIDE 40 MG: 10 INJECTION, SOLUTION INTRAMUSCULAR; INTRAVENOUS at 04:59

## 2019-02-21 RX ADMIN — POTASSIUM CHLORIDE 20 MEQ: 1500 TABLET, EXTENDED RELEASE ORAL at 09:39

## 2019-02-21 RX ADMIN — DRONABINOL 2.5 MG: 2.5 CAPSULE ORAL at 12:58

## 2019-02-21 RX ADMIN — HEPARIN SODIUM 5000 UNITS: 5000 INJECTION, SOLUTION INTRAVENOUS; SUBCUTANEOUS at 09:39

## 2019-02-21 NOTE — ASSESSMENT & PLAN NOTE
History of CHF  Echo in August 2018 shows EF of 40-45%  Currently on Lasix 40 mg b i d  And will continue the same at home  Wife reported that patient has not been taking diuretics for last couple days  Does not appear to be in acute exacerbation  No signs or symptoms of acute respiratory distress  Low-salt diet  Repeat Echo showed similar findings to August 2018  Patient has been cleared by Cardiology for discharge

## 2019-02-21 NOTE — CONSULTS
Visited from 13:20 - 14:00 on 2/21/19  Pt  was alone  Pt  stated that he might be going home, today, which pleases him  Pt  described a life h/x which seems to have been challenging but, according to Pt , has allowed him to become quite patient, forgiving, and empathic toward others  Pt  states he has some pain but it is not debilitating  Pt  states he has some simple pleasures in life that he hopes to continue but is accepting of "whatever God has in store for him "  Pt  was quiet in speech, though engaged and engaging, and did not seem in any particular spiritual distress  Pt  claimed to find solace in several different interpretations of God and annika practice

## 2019-02-21 NOTE — PROGRESS NOTES
NEPHROLOGY PROGRESS NOTE    Patient: Kev Fisher               Sex: male          DOA: 2/18/2019 11:56 AM   YOB: 1930        Age:  80 y o         LOS:  LOS: 3 days       HPI     Patient with stage IV CKD and cardiomyopathy admitted with generalized weakness    SUBJECTIVE     He is feeling better  Eating better with medication  No shortness of breath no chest pain no palpitation    CURRENT MEDICATIONS       Current Facility-Administered Medications:     acetaminophen (TYLENOL) tablet 975 mg, 975 mg, Oral, Q8H PRN, Arvin No MD    aspirin (ECOTRIN LOW STRENGTH) EC tablet 81 mg, 81 mg, Oral, Daily, Chaz MCCORMICK MD, 81 mg at 02/21/19 0939    carvedilol (COREG) tablet 6 25 mg, 6 25 mg, Oral, BID With Meals, Manuel MCCORMICK MD, 6 25 mg at 02/20/19 1817    dronabinol (MARINOL) capsule 2 5 mg, 2 5 mg, Oral, BID before lunch/dinner, Arvin No MD, 2 5 mg at 02/20/19 1821    furosemide (LASIX) injection 40 mg, 40 mg, Intravenous, Q12H, Chaz MCCORMICK MD, 40 mg at 02/21/19 0459    heparin (porcine) subcutaneous injection 5,000 Units, 5,000 Units, Subcutaneous, Q12H [address], 5,000 Units at 02/21/19 0939 **AND** [CANCELED] Platelet count, , , AM Draw, Manuel MCCORMICK MD    miconazole 2 % cream, , Topical, BID, Goyo Grider PA-C, 1 application at 35/73/68 0946    potassium chloride (K-DUR,KLOR-CON) CR tablet 20 mEq, 20 mEq, Oral, BID, Goyo Grider PA-C, 20 mEq at 02/21/19 0056    OBJECTIVE     Current Weight: Weight - Scale: 63 7 kg (140 lb 6 9 oz)  Vitals:    02/21/19 0500   BP: 138/77   Pulse: 83   Resp: 18   Temp:    SpO2: 98%       Intake/Output Summary (Last 24 hours) at 2/21/2019 1130  Last data filed at 2/21/2019 3595  Gross per 24 hour   Intake 420 ml   Output 950 ml   Net -530 ml       PHYSICAL EXAMINATION     Physical Exam   Constitutional: He is oriented to person, place, and time  He appears well-developed  No distress  HENT:   Head: Normocephalic  Mouth/Throat: Oropharynx is clear and moist    Eyes: Conjunctivae are normal  No scleral icterus  Neck: Neck supple  No JVD present  Cardiovascular: Normal rate and normal heart sounds  Pulmonary/Chest: Effort normal  He has no wheezes  Abdominal: Soft  There is no tenderness  Musculoskeletal: Normal range of motion  He exhibits no edema  Neurological: He is alert and oriented to person, place, and time  Skin: Skin is warm  No rash noted  Psychiatric: He has a normal mood and affect  His behavior is normal         LAB RESULTS     Results from last 7 days   Lab Units 02/21/19  0447 02/19/19  1126 02/18/19  1220   WBC Thousand/uL 5 04 6 07 6 22   HEMOGLOBIN g/dL 11 8* 11 6* 12 3   HEMATOCRIT % 37 9 37 3 40 2   PLATELETS Thousands/uL 156 156 151   POTASSIUM mmol/L 4 1 4 2 4 4   CHLORIDE mmol/L 104 102 103   CO2 mmol/L 29 29 26   BUN mg/dL 51* 34* 33*   CREATININE mg/dL 2 81* 2 50* 2 43*   EGFR ml/min/1 73sq m 19 22 23   CALCIUM mg/dL 8 9 8 9 9 0   MAGNESIUM mg/dL  --  2 1  --        RADIOLOGY RESULTS      No results found for this or any previous visit  Results for orders placed during the hospital encounter of 02/18/19   XR chest pa & lateral    Narrative CHEST     INDICATION:   weakness  COMPARISON:  8/31/2018    EXAM PERFORMED/VIEWS:  XR CHEST PA & LATERAL      FINDINGS:  Median sternotomy wires are present  Cardiomediastinal silhouette is stable  Moderate left pleural effusion is identified  Associated left lung base opacity is present  Osseous structures appear within normal limits for patient age  Impression Moderate left pleural effusion and associated left lung base opacity  Workstation performed: [id number]       No results found for this or any previous visit  No results found for this or any previous visit    Results for orders placed during the hospital encounter of 08/31/18   CT abdomen pelvis wo contrast    Narrative CT ABDOMEN AND PELVIS WITHOUT IV CONTRAST    INDICATION:   gross hematuria from ileal conduit/urostomy   "gross hematuria, h/o bladder ca"    COMPARISON:  CT chest on pelvis 8/24/2018  TECHNIQUE:  CT examination of the abdomen and pelvis was performed without intravenous contrast   Axial, sagittal, and coronal 2D reformatted images were created from the source data and submitted for interpretation  Radiation dose length product (DLP) for this visit:  576 mGy-cm   This examination, like all CT scans performed in the [facility], was performed utilizing techniques to minimize radiation dose exposure, including the use of iterative   reconstruction and automated exposure control  Enteric contrast was administered  FINDINGS:    ABDOMEN    LOWER CHEST:  Persistent, partially imaged left pleural effusion  Right-sided effusion is largely resolved  LIVER/BILIARY TREE:  Hepatic cirrhosis  GALLBLADDER:  There are gallstone(s) within the gallbladder, without pericholecystic inflammatory changes  SPLEEN:  Scattered calcified granulomas  PANCREAS:  Unremarkable  ADRENAL GLANDS:  Unremarkable  KIDNEYS/URETERS:  Polycystic kidney disease; no change from May 2014  No hydronephrosis or perinephric collection  STOMACH AND BOWEL:  Colonic diverticulosis  No acute bowel findings  APPENDIX:  No findings to suggest appendicitis  ABDOMINOPELVIC CAVITY:  Right lower quadrant ileal conduit  No apparent complications  VESSELS:  Unremarkable for patient's age  PELVIS    REPRODUCTIVE ORGANS:  Prostatectomy  URINARY BLADDER:  Status post cystectomy  ABDOMINAL WALL/INGUINAL REGIONS:  Previously seen supraumbilical hernia no longer contains bowel  Only a small amount of herniated fat is present within the hernia  OSSEOUS STRUCTURES:  No acute fracture or destructive osseous lesion  Impression Right lower quadrant ileal conduit without complications      Polycystic kidneys unchanged from the prior study  No new renal findings  Persistent partially imaged left basilar effusion and resolved right basilar effusion  Previously described supraumbilical hernia no longer contains a segment of bowel  Workstation performed: [id number]       No results found for this or any previous visit  PLAN / RECOMMENDATIONS      CKD stage 4:  Seems to be stable    Cardiomyopathy:  EF is stable at 40%    Generalized weakness:  Getting better with medication    Disposition:  Can be discharged renal point of view    Xin Aguirre MD  Nephrology  2/21/2019        Portions of the record may have been created with voice recognition software  Occasional wrong word or "sound a like" substitutions may have occurred due to the inherent limitations of voice recognition software  Read the chart carefully and recognize, using context, where substitutions have occurred

## 2019-02-21 NOTE — PLAN OF CARE
Problem: Potential for Falls  Goal: Patient will remain free of falls  Description  INTERVENTIONS:  - Assess patient frequently for physical needs  -  Identify cognitive and physical deficits and behaviors that affect risk of falls  -  [place] fall precautions as indicated by assessment   - Educate patient/family on patient safety including physical limitations  - Instruct patient to call for assistance with activity based on assessment  - Modify environment to reduce risk of injury  - Consider OT/PT consult to assist with strengthening/mobility  Outcome: Progressing     Problem: PAIN - ADULT  Goal: Verbalizes/displays adequate comfort level or baseline comfort level  Description  Interventions:  - Encourage patient to monitor pain and request assistance  - Assess pain using appropriate pain scale  - Administer analgesics based on type and severity of pain and evaluate response  - Implement non-pharmacological measures as appropriate and evaluate response  - Consider cultural and social influences on pain and pain management  - Notify physician/advanced practitioner if interventions unsuccessful or patient reports new pain  Outcome: Progressing     Problem: Knowledge Deficit  Goal: Patient/family/caregiver demonstrates understanding of disease process, treatment plan, medications, and discharge instructions  Description  Complete learning assessment and assess knowledge base  Interventions:  - Provide teaching at level of understanding  - Provide teaching via preferred learning methods  Outcome: Progressing     Problem: Nutrition/Hydration-ADULT  Goal: Nutrient/Hydration intake appropriate for improving, restoring or maintaining nutritional needs  Description  Monitor and assess patient's nutrition/hydration status for malnutrition (ex- brittle hair, bruises, dry skin, pale skin and conjunctiva, muscle wasting, smooth red tongue, and disorientation)   Collaborate with interdisciplinary team and initiate plan and interventions as ordered  Monitor patient's weight and dietary intake as ordered or per policy  Utilize nutrition screening tool and intervene per policy  Determine patient's food preferences and provide high-protein, high-caloric foods as appropriate       INTERVENTIONS:  - Monitor oral intake, urinary output, labs, and treatment plans  - Assess nutrition and hydration status and recommend course of action  - Evaluate amount of meals eaten  - Assist patient with eating if necessary   - Allow adequate time for meals  - Recommend/ encourage appropriate diets, oral nutritional supplements, and vitamin/mineral supplements  - Order, calculate, and assess calorie counts as needed  - Recommend, monitor, and adjust tube feedings  based on assessed needs  - Assess need for intravenous fluids  - Provide nutrition/hydration education as appropriate  - Include patient/family/caregiver in decisions related to nutrition   Outcome: Progressing

## 2019-02-21 NOTE — ASSESSMENT & PLAN NOTE
Multifactorial, but most likely related to multiple medical comorbidities and decreased p o  Intake as well as polypharmacy  Patient will continue with physical therapy at home  Stopped beta blocker which might have been contributing to generalized weakness  Marinol has been prescribed to increase p o  Intake

## 2019-02-21 NOTE — ASSESSMENT & PLAN NOTE
· Patient complains of atypical chest pain with physical therapy this morning  · Troponin has been checked and trended down  · EKG shows Afib with no signs of ischemia  · Troponin elevation is likely of non ischemic etiology in the settings of CKD stage 4  · 2D echo has been done and reveals ejection fraction of 40 with global hypokinesis which is stable and unchanged from August 2018  · Cardiology consult is appreciated

## 2019-02-21 NOTE — ASSESSMENT & PLAN NOTE
· History of CAD status post CABG  · Denies any new complaints  · Not in acute distress  · Holding aspirin due to hematuria  · Cardiology stopped beta blocker due to stable heart rates  · Started on Lipitor

## 2019-02-21 NOTE — DISCHARGE SUMMARY
Discharge- Poncho Davis 6/21/1930, 80 y o  male MRN: [id number]    Unit/Bed#: -01 Encounter: [id number]    Primary Care Provider: Ty Erwin MD   Date and time admitted to hospital: 2/18/2019 11:56 AM        Chronic kidney disease (CKD), stage IV (severe) ([facility] )  Assessment & Plan  · Stable chronic condition  · Currently appears to be at baseline  · Cleared by Nephrology for discharge  CAD (coronary artery disease)  Assessment & Plan  · History of CAD status post CABG  · Denies any new complaints  · Not in acute distress  · Holding aspirin due to hematuria  · Cardiology stopped beta blocker due to stable heart rates  · Started on Lipitor  Atrial fibrillation ([facility] )  Assessment & Plan  · History of AFib on beta-blocker  Currently not on any anticoagulation due to hematuria in the past   · Beta-blocker has been stopped by Cardiology as heart rate are well controlled and patient has been complaining of generalized weakness which may be a side effect of beta-blocker  History of CHF (congestive heart failure)  Assessment & Plan  History of CHF  Echo in August 2018 shows EF of 40-45%  Currently on Lasix 40 mg b i d  And will continue the same at home  Wife reported that patient has not been taking diuretics for last couple days  Does not appear to be in acute exacerbation  No signs or symptoms of acute respiratory distress  Low-salt diet  Repeat Echo showed similar findings to August 2018  Patient has been cleared by Cardiology for discharge  Elevated troponin  Assessment & Plan  · Patient complains of atypical chest pain with physical therapy this morning  · Troponin has been checked and trended down  · EKG shows Afib with no signs of ischemia  · Troponin elevation is likely of non ischemic etiology in the settings of CKD stage 4  · 2D echo has been done and reveals ejection fraction of 40 with global hypokinesis which is stable and unchanged from August 2018    · Cardiology consult is appreciated  * Generalized weakness  Assessment & Plan  Multifactorial, but most likely related to multiple medical comorbidities and decreased p o  Intake as well as polypharmacy  Patient will continue with physical therapy at home  Stopped beta blocker which might have been contributing to generalized weakness  Marinol has been prescribed to increase p o  Intake  Discharge Summary - [facility]    Patient Information: Hayde Kc 80 y o  male MRN: [id number]  Unit/Bed#: -01 Encounter: [id number]    Discharging Physician / Practitioner: Carmelina Leslie MD  PCP: Andrei Darden MD  Admission Date: 2/18/2019  Discharge Date: 02/21/19    Reason for Admission:  Generalized weakness    Discharge Diagnoses:     Principal Problem:    Generalized weakness  Active Problems:    Elevated troponin    History of CHF (congestive heart failure)    Atrial fibrillation (HCC)    CAD (coronary artery disease)    Chronic kidney disease (CKD), stage IV (severe) ([facility] )  Resolved Problems:    * No resolved hospital problems  *      Consultations During Hospital Stay:  · Cardiology, Nephrology, Palliative care    Procedures Performed:     · 2D echo, CT scan of the abdomen, CT scan of the head and cervical spine    Significant Findings / Test Results:   CT of the abdomen and pelvis:     Background COPD with prominent interstitial markings again concerning for chronic interstitial lung disease  Additionally, there is left greater than right basilar bronchiectasis likely reflecting sequelae of previous infectious or inflammatory   process  2   Loculated left pleural effusion with associated compressive atelectasis  3   Evidence of prior granulomatous infection including partially calcified mediastinal/hilar lymphadenopathy as well as splenic more than hepatic calcified granulomas      2D echo: Ejection fraction was estimated to be 40 %   There was hypokinesis of the entire anterior, entire inferior, basal-mid inferolateral, basal-mid anterolateral, and  apical lateral wall(s)  Wall thickness was mildly increased    Incidental Findings:   CT scan of the abdomen:   Evidence of prior granulomatous infection including partially calcified mediastinal/hilar lymphadenopathy as well as splenic more than hepatic calcified granulomas  Cholelithiasis without cholecystitis  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  The stoma remains patent  ·     Test Results Pending at Discharge (will require follow up): · None     Outpatient Tests Requested:  Repeat BMP within 1 week    Complications:  None    Hospital Course:     Orion Nelson is a 80 y o  male patient who originally presented to the hospital on 2/18/2019 due to generalized weakness  He has received diuresis with IV Lasix  He has also been started on Marinol to increase his p o  Intake  CT scan of the brain, C-spine, abdomen and pelvis were done without any significant acute abnormalities detected  Patient also had 2D echo done which was basically not changed from the baseline in August 2018  Currently, patient is doing better and admits that his energy level has improved  He will be going home with home services  Condition at Discharge: stable     Discharge Day Visit / Exam:     Subjective:  Patient was seen and examined at the bedside today  His wife is in his room  He does state that his weakness has been improving and he feels better now than on admission  His appetite has improved  Per wife, he admitted to her that he skipped several his diuretic medications while he was at home  Of note, it mentioned an episode of chest pain when he was doing physical therapy earlier today  Currently, he is chest pain-free        Vitals: Blood Pressure: 138/77 (02/21/19 0500)  Pulse: 83 (02/21/19 0500)  Temperature: (!) 97 16 °F (36 2 °C) (02/21/19 1153)  Temp Source: Oral (02/20/19 2322)  Respirations: 18 (02/21/19 0500)  Height: 5' 5" (165 1 cm) (02/18/19 1527)  Weight - Scale: 63 7 kg (140 lb 6 9 oz) (02/21/19 0600)  SpO2: 99 % (02/21/19 1216)     Exam:   Physical Exam   Constitutional: He is oriented to person, place, and time  He has a sickly appearance  Generally unkempt appearance  Neck: No JVD present  Cardiovascular: Normal rate  An irregularly irregular rhythm present  No murmur heard  Pulmonary/Chest: Effort normal  No respiratory distress  Mild bibasilar crackles bilaterally   Abdominal: Soft  There is no tenderness  Musculoskeletal: He exhibits no edema  Neurological: He is alert and oriented to person, place, and time  Skin: No erythema  Psychiatric: He has a normal mood and affect  Discussion with Family:  Yes  Wife  Discharge instructions/Information to patient and family:   See after visit summary for information provided to patient and family  Provisions for Follow-Up Care:  See after visit summary for information related to follow-up care and any pertinent home health orders  Disposition:     Home with VNA Services (Reminder: Complete face to face encounter)    For Discharges to [facility]:   · Not Applicable to this Patient - Not Applicable to this Patient    Planned Readmission:  No     Discharge Statement:  I spent 15 minutes discharging the patient  This time was spent on the day of discharge  I had direct contact with the patient on the day of discharge  Greater than 50% of the total time was spent examining patient, answering all patient questions, arranging and discussing plan of care with patient as well as directly providing post-discharge instructions  Additional time then spent on discharge activities  Discharge Medications:  See after visit summary for reconciled discharge medications provided to patient and family        ** Please Note: This note has been constructed using a voice recognition system **

## 2019-02-21 NOTE — ASSESSMENT & PLAN NOTE
· History of AFib on beta-blocker  Currently not on any anticoagulation due to hematuria in the past   · Beta-blocker has been stopped by Cardiology as heart rate are well controlled and patient has been complaining of generalized weakness which may be a side effect of beta-blocker

## 2019-02-21 NOTE — PROGRESS NOTES
Progress note - Palliative and Supportive Care   Kp Hall 80 y o  male [id number]    Assessment:     Patient Active Problem List   Diagnosis    Elevated troponin    [facility]acute kidney [facility]) ([facility] )    H/O carotid angioplasty    HTN (hypertension)    History of CHF (congestive heart failure)    Melena    SOB (shortness of breath)    Dizziness    Chronic systolic congestive heart failure (HCC)    Atrial fibrillation (HCC)    CKD (chronic kidney disease) stage 4, GFR 15-29 ml/min (HCC)    Anemia    Hypertensive CKD (chronic kidney disease)    Status post ileal conduit (HCC)    Generalized weakness    Urinary tract infection    History of cerebellar stroke    Hematuria    History of CVA (cerebrovascular accident)    CAD (coronary artery disease)    Malignant neoplasm of urinary bladder (HCC)    Acute systolic (congestive) heart failure (HCC)    Chronic kidney disease (CKD), stage IV (severe) ([facility] )    Gross hematuria    Hypokalemia         Plan:  1  Symptom management -   - discharge on marinol 2 5mg BID, script provided  - encouraged PO intake  2  Goals - Patient and wife will take another two weeks to consider goals of care  I will see him in clinic or speak to him over the phone at that time  Will hope for improvement of appetite  Currently would be hospice appropriate  See original consult for details  Interval history:       Patient's appetite has fluctuated again and he has had poor PO intake in the past 24 hours  Spouse is ready to take him home and we talked about working towards any additional benefits about which I spoke with case mgmt  She expressed her concern about his functional decline and we spoke of some of the issues concerning family dynamic as well as their business  Patient denies any other complaints and is ready to go home       MEDICATIONS / ALLERGIES:     all current active meds have been reviewed and current meds:   No current facility-administered medications for this encounter  No Known Allergies    OBJECTIVE:    Physical Exam  Physical Exam   Constitutional: No distress  HENT:   Head: Atraumatic  Eyes: Right eye exhibits no discharge  Left eye exhibits no discharge  Pulmonary/Chest: Effort normal  No respiratory distress  Abdominal: He exhibits no distension  Musculoskeletal: He exhibits no edema  Neurological: He is alert  Skin: Skin is warm and dry  Psychiatric: He has a normal mood and affect  Nursing note and vitals reviewed  Lab Results:   I have personally reviewed pertinent labs  , CBC:   Lab Results   Component Value Date    WBC 5 04 02/21/2019    HGB 11 8 (L) 02/21/2019    HCT 37 9 02/21/2019    MCV 85 02/21/2019     02/21/2019    MCH 26 4 (L) 02/21/2019    MCHC 31 1 (L) 02/21/2019    RDW 15 7 (H) 02/21/2019    MPV 10 9 02/21/2019    NRBC 0 02/21/2019   , CMP:   Lab Results   Component Value Date    SODIUM 140 02/21/2019    K 4 1 02/21/2019     02/21/2019    CO2 29 02/21/2019    BUN 51 (H) 02/21/2019    CREATININE 2 81 (H) 02/21/2019    CALCIUM 8 9 02/21/2019    EGFR 19 02/21/2019         Counseling / Coordination of Care  Total floor / unit time spent today 25+ minutes  Greater than 50% of total time was spent with the patient and / or family counseling and / or coordination of care  A description of the counseling / coordination of care: goals of care counseling, discharge/follow up planning, symptom assessment

## 2019-02-21 NOTE — CONSULTS
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 Gray Case is a 80y o  year old male who has a history of systolic heart failure, paroxysmal atrial fibrillation, hypertension, CAD with a history of a CABG, polycystic kidney disease, bladder cancer, prostatectomy with ileal conduit, CKD 4 , CVA, TIA, COPD, hepatitis who presented from home with worsening weakness  Patient is a poor historian, but the wife reports that for the past couple of weeks the patient has been gradually deteriorating with weakness and multiple falls  CT of the chest showed bronchiectasis and a left pleural effusion chest x-ray also showed left pleural effusion and opacity  EKG showed atrial flutter with right bundle branch block  Troponins drawn in the ED were elevated at 0 44 and have since trended down to 0 08  Patient currently is denying shortness of breath as he does not walk or exert himself, per the wife he is primarily bed bound  He does state that he has some chest pain on the left side, but it is intermittent and dull in nature          REVIEW OF SYSTEMS:  Constitutional:  Denies fever or chills   Eyes:  Denies change in visual acuity   HENT:  Denies nasal congestion or sore throat   Respiratory:  Denies cough or shortness of breath   Cardiovascular: +chest pain or edema   GI:  Denies abdominal pain, nausea, vomiting, bloody stools or diarrhea   :  Denies dysuria, frequency, difficulty in micturition and nocturia  Musculoskeletal:  Denies back pain or joint pain   Neurologic:  Denies headache, focal weakness or sensory changes   Endocrine:  Denies polyuria or polydipsia   Lymphatic:  Denies swollen glands   Psychiatric:  Denies depression or anxiety     Historical Information   Past Medical History:   Diagnosis Date    Atrial fibrillation ([facility] )     CAD (coronary artery disease)     Cancer ([facility] )     bladder    Cerebellar stroke, acute ([facility] ) 2017    CHF (congestive heart failure) (HCC)     Chronic kidney disease     COPD (chronic obstructive pulmonary disease) (HCC)     Hepatitis C     Hypertension     TIA (transient ischemic attack)      Past Surgical History:   Procedure Laterality Date    CAROTID ARTERY ANGIOPLASTY      COLONOSCOPY N/A 3/24/2017    Procedure: COLONOSCOPY;  Surgeon: Alberta Emmanuel MD;  Location: [facility]; Service:     CORONARY ARTERY BYPASS GRAFT      CYSTECTOMY      ESOPHAGOGASTRODUODENOSCOPY N/A 3/23/2017    Procedure: ESOPHAGOGASTRODUODENOSCOPY (EGD); Surgeon: Alberta Emmanuel MD;  Location: [facility];   Service:    Dat Santiago EYE SURGERY      ILEO CONDUIT       Social History     Substance and Sexual Activity   Alcohol Use Not Currently     Social History     Substance and Sexual Activity   Drug Use No     Social History     Tobacco Use   Smoking Status Former Smoker    Packs/day: 1 00    Years: 5 00    Pack years: 5 00    Types: Cigarettes    Last attempt to quit: 1973    Years since quittin 0   Smokeless Tobacco Former User       Family History:   Family History   Problem Relation Age of Onset    Coronary artery disease Mother     Lung disease Father     Heart disease Brother        MEDS & ALLERGIES:  all current active meds have been reviewed and current meds:   Current Facility-Administered Medications   Medication Dose Route Frequency    acetaminophen (TYLENOL) tablet 975 mg  975 mg Oral Q8H PRN    aspirin (ECOTRIN LOW STRENGTH) EC tablet 81 mg  81 mg Oral Daily    carvedilol (COREG) tablet 6 25 mg  6 25 mg Oral BID With Meals    dronabinol (MARINOL) capsule 2 5 mg 2 5 mg Oral BID before lunch/dinner    furosemide (LASIX) injection 40 mg  40 mg Intravenous Q12H    heparin (porcine) subcutaneous injection 5,000 Units  5,000 Units Subcutaneous Q12H [address]    miconazole 2 % cream   Topical BID    potassium chloride (K-DUR,KLOR-CON) CR tablet 20 mEq  20 mEq Oral BID        No Known Allergies    OBJECTIVE:  Vitals:   Vitals:    02/21/19 1216   BP:    Pulse:    Resp:    Temp:    SpO2: 99%     Body mass index is 23 37 kg/m²  Systolic (84XFK), BEW:753 , Min:110 , AVN:923     Diastolic (66TLA), PSG:10, Min:66, Max:77      Intake/Output Summary (Last 24 hours) at 2/21/2019 1358  Last data filed at 2/21/2019 5145  Gross per 24 hour   Intake 420 ml   Output 950 ml   Net -530 ml     Weight (last 2 days)     Date/Time   Weight    02/21/19 0600   63 7 (140 43)            Invasive Devices     Peripheral Intravenous Line            Peripheral IV 02/18/19 Right Forearm 3 days          Drain            Ureteral Drain/Stent Right ureter -- days    Urostomy Ileal conduit RLQ -- days    Urostomy Ileal conduit 173 days                PHYSICAL EXAMS:  General:  Patient is not in acute distress, laying in the bed comfortably, awake, alert responding to commands  Head: Normocephalic, Atraumatic  HEENT: White sclera, pink conjunctiva,  PERRLA, pharynx benign  Neck:  Supple, no neck vein distention,  thyromegaly, adenopathy  Respiratory:  Crackles noted in all left-sided lung fields, right side clear  Cardiovascular:  PMI normal, S1-S2, irregularly irregular rhythm  No  Murmurs, thrills, gallops, rubs  GI:  Abdomen soft nontender   No hepatosplenomegaly, adenopathy, ascites,or rebound tenderness  Extremities: No edema, normal pulses, no calf tenderness, no joint deformities, no venous disease   Integument:  No skin rashes or ulceration  Neurologic:  Patient is awake alert, responding to command, well-oriented to time and place and person moving all extremities    LABORATORY RESULTS:  Results from last 7 days   Lab Units 02/21/19  1053 02/19/19  1126 02/18/19  1952   TROPONIN I ng/mL 0 08* 0 23* 0 38*     CBC with diff:   Results from last 7 days   Lab Units 02/21/19  0447 02/19/19  1126 02/18/19  1220   WBC Thousand/uL 5 04 6 07 6 22   HEMOGLOBIN g/dL 11 8* 11 6* 12 3   HEMATOCRIT % 37 9 37 3 40 2   MCV fL 85 85 87   PLATELETS Thousands/uL 156 156 151   MCH pg 26 4* 26 5* 26 6*   MCHC g/dL 31 1* 31 1* 30 6*   RDW % 15 7* 15 9* 15 9*   MPV fL 10 9 10 9 10 8   NRBC AUTO /100 WBCs 0 0 0       CMP:  Results from last 7 days   Lab Units 02/21/19  0447 02/19/19  1126 02/18/19  1220   POTASSIUM mmol/L 4 1 4 2 4 4   CHLORIDE mmol/L 104 102 103   CO2 mmol/L 29 29 26   BUN mg/dL 51* 34* 33*   CREATININE mg/dL 2 81* 2 50* 2 43*   CALCIUM mg/dL 8 9 8 9 9 0   AST U/L  --   --  24   ALT U/L  --   --  9*   ALK PHOS U/L  --   --  95   EGFR ml/min/1 73sq m 19 22 23       BMP:  Results from last 7 days   Lab Units 02/21/19  0447 02/19/19  1126 02/18/19  1220   POTASSIUM mmol/L 4 1 4 2 4 4   CHLORIDE mmol/L 104 102 103   CO2 mmol/L 29 29 26   BUN mg/dL 51* 34* 33*   CREATININE mg/dL 2 81* 2 50* 2 43*   CALCIUM mg/dL 8 9 8 9 9 0            Results from last 7 days   Lab Units 02/19/19  1126   MAGNESIUM mg/dL 2 1         Results from last 7 days   Lab Units 02/18/19  1828   TSH 3RD GENERATON uIU/mL 1 033     Results from last 7 days   Lab Units 02/18/19  1220   INR  1 26*       Lipid Profile:   No results found for: CHOL  Lab Results   Component Value Date    HDL 48 07/17/2017     Lab Results   Component Value Date    LDLCALC 79 07/17/2017     Lab Results   Component Value Date    TRIG 72 07/17/2017       Cardiac testing:   Results for orders placed during the hospital encounter of 02/18/19   Echo complete with contrast if indicated    Narrative [address] [place] [id number] [phone]    Transthoracic Echocardiogram  2D, M-mode, Doppler, and Color Doppler    Study date: 2019    Patient: Nan Orozco  MR number: [id number]  Account number: [de-identified]  : 1930  Age: 80 years  Gender: Male  Status: Inpatient  Location: Bedside  Height: 65 in  Weight: 140 lb  BP: 119/ 60 mmHg    Indications: Heart failure, Assess left ventricular function  Diagnoses: I50 9 - Heart failure, unspecified    Sonographer:   [address]  Referring Physician:  Braxton Fuentes MD  Group:  [facility]  Interpreting Physician:  Sahil Chaparro MD    SUMMARY    LEFT VENTRICLE:  Systolic function was moderately reduced  Ejection fraction was estimated to be 40 %  There was hypokinesis of the entire anterior, entire inferior, basal-mid inferolateral, basal-mid anterolateral, and apical lateral wall(s)  Wall thickness was mildly increased  RIGHT VENTRICLE:  The ventricle was mildly dilated  Systolic function was moderately reduced  LEFT ATRIUM:  The atrium was mildly dilated  RIGHT ATRIUM:  The atrium was mildly dilated  MITRAL VALVE:  There was moderate to severe regurgitation  TRICUSPID VALVE:  There was mild regurgitation  PERICARDIUM:  There was a left pleural effusion  COMPARISONS:  There has been no significant interval change  Comparison was made with the previous study of 24-Aug-2018  HISTORY: Dizziness  Consistent with transient ischemic attack or stroke  PRIOR HISTORY: Elevated troponin, CAD, CKD, Angina, Shortness of breath, Congestive heart failure  Atrial fibrillation  Risk factors: hypertension  Chronic lung  disease  PROCEDURE: The procedure was performed at the bedside  This was a routine study  The transthoracic approach was used  The study included complete 2D imaging, M-mode, complete spectral Doppler, and color Doppler  The heart rate was 79 bpm,  at the start of the study  Images were obtained from the parasternal, apical, subcostal, and suprasternal notch acoustic windows  Image quality was adequate      LEFT VENTRICLE: Size was normal  Systolic function was moderately reduced  Ejection fraction was estimated to be 40 %  There was hypokinesis of the entire anterior, entire inferior, basal-mid inferolateral, basal-mid anterolateral, and  apical lateral wall(s)  Wall thickness was mildly increased  DOPPLER: The study was not technically sufficient to allow evaluation of LV diastolic function  RIGHT VENTRICLE: The ventricle was mildly dilated  Systolic function was moderately reduced  LEFT ATRIUM: The atrium was mildly dilated  RIGHT ATRIUM: The atrium was mildly dilated  MITRAL VALVE: Valve structure was normal  There was normal leaflet separation  DOPPLER: The transmitral velocity was within the normal range  There was no evidence for stenosis  There was moderate to severe regurgitation  AORTIC VALVE: The valve was trileaflet  Leaflets exhibited normal thickness and normal cuspal separation  DOPPLER: Transaortic velocity was within the normal range  There was no evidence for stenosis  There was trace regurgitation  TRICUSPID VALVE: The valve structure was normal  There was normal leaflet separation  DOPPLER: The transtricuspid velocity was within the normal range  There was no evidence for stenosis  There was mild regurgitation  Pulmonary artery  systolic pressure was within the normal range  Estimated peak PA pressure was 30 mmHg  PULMONIC VALVE: Leaflets exhibited normal thickness, no calcification, and normal cuspal separation  DOPPLER: The transpulmonic velocity was within the normal range  There was trace regurgitation  PERICARDIUM: There was a left pleural effusion  AORTA: The root exhibited normal size  SYSTEMIC VEINS: IVC: The inferior vena was small, appearing collapsed from external pressure      SYSTEM MEASUREMENT TABLES    2D  %FS: 12 1 %  Ao Diam: 3 3 cm  EDV(Teich): 117 3 ml  EF(Teich): 26 %  ESV(Teich): 86 9 ml  IVSd: 1 5 cm  LA Area: 24 3 cm2  LA Diam: 4 8 cm  LVEDV MOD A4C: 128 3 ml  LVEF MOD A4C: 33 8 %  LVESV MOD A4C: 84 9 ml  LVIDd: 5 cm  LVIDs: 4 4 cm  LVLd A4C: 8 1 cm  LVLs A4C: 7 3 cm  LVPWd: 1 1 cm  RA Area: 24 3 cm2  RVIDd: 4 3 cm  SV MOD A4C: 43 4 ml  SV(Teich): 30 4 ml    CW  MR VTI: 167 4 cm  MR Vmax: 5 1 m/s  MR Vmean: 3 8 m/s  MR maxP 6 mmHg  MR meanP 2 mmHg  TR Vmax: 2 8 m/s  TR maxP 6 mmHg    MM  TAPSE: 1 2 cm    PW  E': 0 1 m/s  E/E': 14 6  MV A Ishan: 0 3 m/s  MV Dec Twiggs: 4 9 m/s2  MV DecT: 190 1 ms  MV E Ishan: 0 9 m/s  MV E/A Ratio: 3  MV PHT: 55 1 ms  MVA By PHT: 4 cm2    [facility] Accredited Echocardiography Laboratory    Prepared and electronically signed by    Zion Basurto MD  Signed 2019 21:33:06       No results found for this or any previous visit  No procedure found  No results found for this or any previous visit  EKG reviewed personally:  Atrial flutter with variable AV block with premature ventricular complexes, right bundle branch block, left posterior fascicular block    Telemetry reviewed personally:   Atrial fibrillation and atrial flutter with PVCs, right bundle branch block and nonsustained V-tach    Assessment/Plan:  1  Chronic systolic heart failure  -Echo reviewed EF noted to be 40% with moderate to severe mitral valve regurgitation and hypokinesis in the entire anterior and inferior walls, basal-mid inferior lateral, basal-mid anterolateral and apical lateral walls  -continue Lasix 40 mg p o  Daily  -follow up with Dr Hand Cancer as outpatient    2  Ischemic cardiomyopathy in the setting of CAD with CABG  - EF mildly decreased to 40% on echo this admission as compared to echo in 2018    3  Paroxysmal atrial fibrillation  -patient currently not on anticoagulation due to hematuria  -continue aspirin  -D/C carvedilol and monitor heart rate    3   Elevated troponin in the setting of CKD 4  -troponin trended down from 0 44 to  08  -suspect non ACS etiology    This treatment plan was created by Tatiana Joshi  Code Status: Level 3 - DNAR and DNI    Counseling / Coordination of Care  Total floor / unit time spent today 30 minutes  Greater than 50% of total time was spent with the patient and / or family counseling and / or coordination of care  A description of the counseling / coordination of care: Review of history, current assessment, development of a plan      [address] [place] [address]  2/21/2019,1:58 PM

## 2019-02-21 NOTE — ASSESSMENT & PLAN NOTE
· Stable chronic condition  · Currently appears to be at baseline  · Cleared by Nephrology for discharge

## 2019-02-21 NOTE — PLAN OF CARE
Problem: PHYSICAL THERAPY ADULT  Goal: Performs mobility at highest level of function for planned discharge setting  See evaluation for individualized goals  Description  Treatment/Interventions: Functional transfer training, LE strengthening/ROM, Elevations, Therapeutic exercise, Endurance training, Patient/family training, Equipment eval/education, Bed mobility, Gait training, Spoke to nursing, Family          See flowsheet documentation for full assessment, interventions and recommendations  Note:   Prognosis: Good  Problem List: Decreased strength, Decreased endurance, Impaired balance, Decreased mobility, Decreased cognition, Impaired hearing, Pain  Assessment: Pt is 80 y o  male seen for PT evaluation s/p admit to [facility] on 2/18/2019 w/ Generalized weakness  PT consulted to assess pt's functional mobility and d/c needs  Order placed for PT eval and tx, w/ up w/ A order  Performed at least 2 patient identifiers during session: Name and wristband  Comorbidities affecting pt's physical performance at time of assessment include: atrial fibrillation, CAD, cancer, cerebellar stroke, CHF, CKD, COPD, Hepatitis C, HTN, TIA  PTA, pt was independent w/ all functional mobility w/ SPC vs  walker vs  no AD, requiring A for ADLs and IADLs, ambulates household distances, has 4 EDUARD, lives w/ spouse in two level house and retired  Personal factors affecting pt at time of IE include: inaccessible home environment, lives in two story house, ambulating w/ assistive device, stairs to enter home, inability to ambulate household distances, inability to navigate level surfaces w/o external assistance, decreased cognition, decreased initiation and engagement, limited insight into impairments, inability to perform IADLs, inability to perform ADLs and inability to live alone   Please find objective findings from PT assessment regarding body systems outlined above with impairments and limitations including weakness, impaired balance, decreased endurance, gait deviations, pain, decreased activity tolerance, decreased functional mobility tolerance, fall risk and decreased cognition  The following objective measures performed on IE also reveal limitations: Barthel Index: 40/100 and Modified [place]: 4 (moderate/severe disability)  Pt's clinical presentation is currently unstable/unpredictable seen in pt's presentation of ongoing medical management/monitoring, pain + fatigue impacting overall mobility status and need for input for mobility technique/safety  Pt to benefit from continued PT tx to address deficits as defined above and maximize level of functional independent mobility and consistency  From PT/mobility standpoint, recommendation at time of d/c would be STR pending progress in order to facilitate return to PLOF  Barriers to Discharge: Inaccessible home environment     Recommendation: Short-term skilled PT     PT - OK to Discharge: Yes(when medically cleared; if to STR)    See flowsheet documentation for full assessment

## 2019-02-21 NOTE — PLAN OF CARE
Problem: Potential for Falls  Goal: Patient will remain free of falls  Description  INTERVENTIONS:  - Assess patient frequently for physical needs  -  Identify cognitive and physical deficits and behaviors that affect risk of falls    -  [place] fall precautions as indicated by assessment   - Educate patient/family on patient safety including physical limitations  - Instruct patient to call for assistance with activity based on assessment  - Modify environment to reduce risk of injury  - Consider OT/PT consult to assist with strengthening/mobility  Outcome: Progressing     Problem: PAIN - ADULT  Goal: Verbalizes/displays adequate comfort level or baseline comfort level  Description  Interventions:  - Encourage patient to monitor pain and request assistance  - Assess pain using appropriate pain scale  - Administer analgesics based on type and severity of pain and evaluate response  - Implement non-pharmacological measures as appropriate and evaluate response  - Consider cultural and social influences on pain and pain management  - Notify physician/advanced practitioner if interventions unsuccessful or patient reports new pain  Outcome: Progressing     Problem: INFECTION - ADULT  Goal: Absence or prevention of progression during hospitalization  Description  INTERVENTIONS:  - Assess and monitor for signs and symptoms of infection  - Monitor lab/diagnostic results  - Monitor all insertion sites, i e  indwelling lines, tubes, and drains  - Monitor endotracheal (as able) and nasal secretions for changes in amount and color  - [place] appropriate cooling/warming therapies per order  - Administer medications as ordered  - Instruct and encourage patient and family to use good hand hygiene technique  - Identify and instruct in appropriate isolation precautions for identified infection/condition  Outcome: Progressing  Goal: Absence of fever/infection during neutropenic period  Description  INTERVENTIONS:  - Monitor WBC  - Implement neutropenic guidelines  Outcome: Progressing     Problem: SAFETY ADULT  Goal: Maintain or return to baseline ADL function  Description  INTERVENTIONS:  -  Assess patient's ability to carry out ADLs; assess patient's baseline for ADL function and identify physical deficits which impact ability to perform ADLs (bathing, care of mouth/teeth, toileting, grooming, dressing, etc )  - Assess/evaluate cause of self-care deficits   - Assess range of motion  - Assess patient's mobility; develop plan if impaired  - Assess patient's need for assistive devices and provide as appropriate  - Encourage maximum independence but intervene and supervise when necessary  ¯ Involve family in performance of ADLs  ¯ Assess for home care needs following discharge   ¯ Request OT consult to assist with ADL evaluation and planning for discharge  ¯ Provide patient education as appropriate  Outcome: Progressing  Goal: Maintain or return mobility status to optimal level  Description  INTERVENTIONS:  - Assess patient's baseline mobility status (ambulation, transfers, stairs, etc )    - Identify cognitive and physical deficits and behaviors that affect mobility  - Identify mobility aids required to assist with transfers and/or ambulation (gait belt, sit-to-stand, lift, walker, cane, etc )  - [place] fall precautions as indicated by assessment  - Record patient progress and toleration of activity level on Mobility SBAR; progress patient to next Phase/Stage  - Instruct patient to call for assistance with activity based on assessment  - Request Rehabilitation consult to assist with strengthening/weightbearing, etc   Outcome: Progressing     Problem: DISCHARGE PLANNING  Goal: Discharge to home or other facility with appropriate resources  Description  INTERVENTIONS:  - Identify barriers to discharge w/patient and caregiver  - Arrange for needed discharge resources and transportation as appropriate  - Identify discharge learning needs (meds, wound care, etc )  - Arrange for interpretive services to assist at discharge as needed  - Refer to Case Management Department for coordinating discharge planning if the patient needs post-hospital services based on physician/advanced practitioner order or complex needs related to functional status, cognitive ability, or social support system  Outcome: Progressing     Problem: Knowledge Deficit  Goal: Patient/family/caregiver demonstrates understanding of disease process, treatment plan, medications, and discharge instructions  Description  Complete learning assessment and assess knowledge base  Interventions:  - Provide teaching at level of understanding  - Provide teaching via preferred learning methods  Outcome: Progressing     Problem: Prexisting or High Potential for Compromised Skin Integrity  Goal: Skin integrity is maintained or improved  Description  INTERVENTIONS:  - Identify patients at risk for skin breakdown  - Assess and monitor skin integrity  - Assess and monitor nutrition and hydration status  - Monitor labs (i e  albumin)  - Assess for incontinence   - Turn and reposition patient  - Assist with mobility/ambulation  - Relieve pressure over bony prominences  - Avoid friction and shearing  - Provide appropriate hygiene as needed including keeping skin clean and dry  - Evaluate need for skin moisturizer/barrier cream  - Collaborate with interdisciplinary team (i e  Nutrition, Rehabilitation, etc )   - Patient/family teaching  Outcome: Progressing     Problem: Nutrition/Hydration-ADULT  Goal: Nutrient/Hydration intake appropriate for improving, restoring or maintaining nutritional needs  Description  Monitor and assess patient's nutrition/hydration status for malnutrition (ex- brittle hair, bruises, dry skin, pale skin and conjunctiva, muscle wasting, smooth red tongue, and disorientation)  Collaborate with interdisciplinary team and initiate plan and interventions as ordered    Monitor patient's weight and dietary intake as ordered or per policy  Utilize nutrition screening tool and intervene per policy  Determine patient's food preferences and provide high-protein, high-caloric foods as appropriate       INTERVENTIONS:  - Monitor oral intake, urinary output, labs, and treatment plans  - Assess nutrition and hydration status and recommend course of action  - Evaluate amount of meals eaten  - Assist patient with eating if necessary   - Allow adequate time for meals  - Recommend/ encourage appropriate diets, oral nutritional supplements, and vitamin/mineral supplements  - Order, calculate, and assess calorie counts as needed  - Recommend, monitor, and adjust tube feedings  based on assessed needs  - Assess need for intravenous fluids  - Provide nutrition/hydration education as appropriate  - Include patient/family/caregiver in decisions related to nutrition   Outcome: Progressing     Problem: DISCHARGE PLANNING - CARE MANAGEMENT  Goal: Discharge to post-acute care or home with appropriate resources  Description  INTERVENTIONS:  - Conduct assessment to determine patient/family and health care team treatment goals, and need for post-acute services based on payer coverage, community resources, and patient preferences, and barriers to discharge  - Address psychosocial, clinical, and financial barriers to discharge as identified in assessment in conjunction with the patient/family and health care team  - Arrange appropriate level of post-acute services according to patient?s   needs and preference and payer coverage in collaboration with the physician and health care team  - Communicate with and update the patient/family, physician, and health care team regarding progress on the discharge plan  - Arrange appropriate transportation to post-acute venues  Outcome: Progressing

## 2019-02-21 NOTE — PHYSICAL THERAPY NOTE
Physical Therapy Evaluation     Patient's Name: Franklin Olson    Admitting Diagnosis  Dehydration [E86 0]  Weakness generalized [R53 1]  Weakness [R53 1]    Problem List  Patient Active Problem List   Diagnosis    Elevated troponin    [facility]) ([facility] )    H/O carotid angioplasty    HTN (hypertension)    History of CHF (congestive heart failure)    Melena    SOB (shortness of breath)    Dizziness    Chronic systolic congestive heart failure (HCC)    Atrial fibrillation (HCC)    CKD (chronic kidney disease) stage 4, GFR 15-29 ml/min (HCC)    Anemia    Hypertensive CKD (chronic kidney disease)    Status post ileal conduit (HCC)    Generalized weakness    Urinary tract infection    History of cerebellar stroke    Hematuria    History of CVA (cerebrovascular accident)    CAD (coronary artery disease)    Malignant neoplasm of urinary bladder (HCC)    Acute systolic (congestive) heart failure (HCC)    Chronic kidney disease (CKD), stage IV (severe) ([facility] )    Gross hematuria    Hypokalemia       Past Medical History  Past Medical History:   Diagnosis Date    Atrial fibrillation ([place] [address] )     CAD (coronary artery disease)     Cancer ([place] [address] )     bladder    Cerebellar stroke, acute ([place] [address] ) 7/16/2017    CH[facility]congestive heart failure) [facility]     Chronic kidney disease     COPD (chronic obstructive pulmonary disease) ([place] [address] )     Hepatitis C     Hypertension     TIA (transient ischemic attack)        Past Surgical History  Past Surgical History:   Procedure Laterality Date    CAROTID ARTERY ANGIOPLASTY      COLONOSCOPY N/A 3/24/2017    Procedure: COLONOSCOPY;  Surgeon: Sherine aTpia MD;  Location: [facility]; Service:     CORONARY ARTERY BYPASS GRAFT      CYSTECTOMY      ESOPHAGOGASTRODUODENOSCOPY N/A 3/23/2017    Procedure: ESOPHAGOGASTRODUODENOSCOPY (EGD); Surgeon: Sherine Tapia MD;  Location: [facility];   Service:     EYE SURGERY      ILEO CONDUIT            02/21/19 8334 Note Type   Note type Eval only   Pain Assessment   Pain Assessment Rios-Baker FACES   Rios-Baker FACES Pain Rating 2   Pain Type Chronic pain;Acute pain   Pain Location Rib cage  (? Rib cage Caity Rincon RN present and aware), ? Chest, patient denies chest pain post-session)   Pain Orientation Left   Pain Frequency Intermittent   Pain Onset Gradual   Patient's Stated Pain Goal No pain   Hospital Pain Intervention(s) Repositioned; Ambulation/increased activity   Diversional Activities Television   Home Living   Type of [address] Two level;Bed/bath upstairs  (4 EDUARD)   Bathroom Shower/Tub Tub/shower unit   Bathroom Toilet Raised   Bathroom Equipment Shower chair;Grab bars in shower; Toilet raiser   [address] Walker;Cane   Prior Function   Level of [place] Independent with ADLs and functional mobility; Needs assistance with IADLs  (Needs assistance with ADLs)   Lives With Spouse   Receives Help From Family   ADL Assistance Needs assistance   IADLs Needs assistance   Falls in the last 6 months 0   Vocational Retired   Restrictions/Precautions   [place] Bearing Precautions Per Order No   Braces or Orthoses Other (Comment)  (none per patient/family)   Other Precautions Multiple lines; Fall Risk;Pain;Bed Alarm; Chair Alarm;Cognitive;Hard of hearing   General   Additional Pertinent History HPI: 80-year-old male patient presents to [address] [place] [address] for evaluation of generalized weakness   Family/Caregiver Present No   Cognition   Overall Cognitive Status Impaired   Arousal/Participation Cooperative   Orientation Level Oriented to person;Oriented to place;Oriented to time;Disoriented to situation   Memory Decreased recall of recent events;Decreased short term memory;Decreased recall of precautions   Following Commands Follows one step commands with increased time or repetition   Comments Pt agreeable to participate in skilled PT evaluation   RUE Assessment   RUE Assessment X  (Grossly assessed with functional mobility: at least 3+/5)   LUE Assessment   LUE Assessment X  (Grossly assessed with functional mobility: at least 3+/5)   RLE Assessment   RLE Assessment WFL  (Grossly assessed with functional mobility: at least 3+/5)   LLE Assessment   LLE Assessment WFL  (Grossly assessed with functional mobility: at least 3+/5)   Coordination   Movements are Fluid and Coordinated 1   Sensation [facility]   Light Touch   RLE Light Touch Grossly intact   LLE Light Touch Grossly intact   Bed Mobility   Sit to Supine 4  Minimal assistance   Additional items Assist x 1; Increased time required;Verbal cues;LE management   Transfers   Sit to Stand 4  Minimal assistance   Additional items Assist x 2; Increased time required;Verbal cues;Armrests   Stand to Sit 4  Minimal assistance   Additional items Assist x 1; Increased time required;Verbal cues   Ambulation/Elevation   Gait pattern Short stride; Excessively slow; Step to;Decreased foot clearance; Forward Flexion   Gait Assistance 4  Minimal assist   Additional items Assist x 1;Verbal cues   Assistive Device Rolling walker   Distance 3' bed>chair   Stair Management Assistance Not tested   Balance   Static Sitting Good   Dynamic Sitting Fair +   Static Standing Fair   Dynamic Standing Fair -   Ambulatory Poor +   Endurance Deficit   Endurance Deficit Yes   Activity Tolerance   Activity Tolerance Patient limited by fatigue;Patient limited by pain   Nurse Emilie Young, MARY ANNE verbalized patient appropriate for PT evaluation; made aware of session outcomes; post-session: patient returned BTB, all needs within each and bed alarm engaged   Assessment   Prognosis Good   Problem List Decreased strength;Decreased endurance; Impaired balance;Decreased mobility; Decreased cognition; Impaired hearing;Pain   Assessment Pt is 80 y o  male seen for PT evaluation s/p admit to [facility] on 2/18/2019 w/ Generalized weakness   PT consulted to assess pt's functional mobility and d/c needs  Order placed for PT eval and tx, w/ up w/ A order  Performed at least 2 patient identifiers during session: Name and wristband  Comorbidities affecting pt's physical performance at time of assessment include: atrial fibrillation, CAD, cancer, cerebellar stroke, CHF, CKD, COPD, Hepatitis C, HTN, TIA  PTA, pt was independent w/ all functional mobility w/ SPC vs  walker vs  no AD, requiring A for ADLs and IADLs, ambulates household distances, has 4 EDUARD, lives w/ spouse in two level house and retired  Personal factors affecting pt at time of IE include: inaccessible home environment, lives in two story house, ambulating w/ assistive device, stairs to enter home, inability to ambulate household distances, inability to navigate level surfaces w/o external assistance, decreased cognition, decreased initiation and engagement, limited insight into impairments, inability to perform IADLs, inability to perform ADLs and inability to live alone  Please find objective findings from PT assessment regarding body systems outlined above with impairments and limitations including weakness, impaired balance, decreased endurance, gait deviations, pain, decreased activity tolerance, decreased functional mobility tolerance, fall risk and decreased cognition  The following objective measures performed on IE also reveal limitations: Barthel Index: 40/100 and Modified [place]: 4 (moderate/severe disability)  Pt's clinical presentation is currently unstable/unpredictable seen in pt's presentation of ongoing medical management/monitoring, pain + fatigue impacting overall mobility status and need for input for mobility technique/safety  Pt to benefit from continued PT tx to address deficits as defined above and maximize level of functional independent mobility and consistency  From PT/mobility standpoint, recommendation at time of d/c would be STR pending progress in order to facilitate return to PLOF     Barriers to Discharge Inaccessible home environment   Goals   Patient Goals "to get out of here as soon as possible"   [facility] Expiration Date 03/03/19   Short Term Goal #1 In 7-10 days: Increase bilateral LE strength 1/2 grade to facilitate independent mobility, Perform all bed mobility tasks modified independent to decrease caregiver burden, Perform all transfers with distant S to improve independence, Ambulate > 50 ft  with least restrictive assistive device with distant S w/o LOB and w/ normalized gait pattern 100% of the time, Navigate 4 stairs x 3 trials with distant S with unilateral handrail to facilitate return to previous living environment and Increase all balance 1/2 grade to decrease risk for falls   Treatment Day 0   Plan   Treatment/Interventions Functional transfer training;LE strengthening/ROM; Elevations; Therapeutic exercise; Endurance training;Patient/family training;Equipment eval/education; Bed mobility;Gait training;Spoke to nursing;Family   PT Frequency Other (Comment)  (3-5x/wk)   Recommendation   Recommendation Short-term skilled PT   PT - OK to Discharge Yes  (when medically cleared; if to STR)   Modified [place] Scale   Modified [place] Scale 4   B[place]hel Index   Feeding 10   Bathing 0   Grooming Score 5   Dressing Score 5   Bladder Score 0   Bowels Score 10   Toilet Use Score 5   Transfers (Bed/Chair) Score 5   Mobility (Level Surface) Score 0   Stairs Score 0   B[place]hel Index Score 40         Ilir Spencer, PT, DPT

## 2019-02-24 LAB
BACTERIA BLD CULT: NORMAL
BACTERIA BLD CULT: NORMAL

## 2019-02-26 LAB
ATRIAL RATE: 90 BPM
QRS AXIS: 89 DEGREES
QRSD INTERVAL: 158 MS
QT INTERVAL: 440 MS
QTC INTERVAL: 538 MS
T WAVE AXIS: 9 DEGREES
VENTRICULAR RATE: 90 BPM

## 2019-02-26 PROCEDURE — 93010 ELECTROCARDIOGRAM REPORT: CPT | Performed by: INTERNAL MEDICINE

## 2019-02-27 ENCOUNTER — TELEPHONE (OUTPATIENT)
Dept: MEDSURG UNIT | Facility: HOSPITAL | Age: 84
End: 2019-02-27

## 2019-02-27 DIAGNOSIS — E43 SEVERE PROTEIN-CALORIE MALNUTRITION (HCC): Primary | ICD-10-CM

## 2021-10-14 NOTE — ASSESSMENT & PLAN NOTE
Kidney function is stable based on last blood test with GFR of about 24  Dialysis has been discussed with him in the past he is refusing and does not want to go on dialysis  He claims he rather die than go on dialysis    I agree with the decision will try to keep him comfortable Offered Tdap vaccine and patient states she already had vaccine and refused at this time.

## 2022-12-27 NOTE — ASSESSMENT & PLAN NOTE
As stated Urology to assess in a m  plan of care explained plan of care explained ambulated to bathroom/meal provided/plan of care explained/po fluids offered meal provided/plan of care explained/po fluids offered ambulated to bathroom/meal provided meal provided/plan of care explained/po fluids offered

## 2024-02-06 NOTE — PROGRESS NOTES
Progress Note - Kp Hall 6/21/1930, 80 y o  male MRN: [id number]    Unit/Bed#: -01 Encounter: [id number]    Primary Care Provider: Wilfredo Sauer MD   Date and time admitted to hospital: 8/24/2018 10:54 AM        * Acute systolic (congestive) heart failure ([address] )   Assessment & Plan    · POA evident by progressive SOB x 1 week and elevated BNP >88053  · Discussion with Cardiology at the bedside transition patient to oral Lasix today if volume status improved likely discharge home tomorrow  Also patient has no chest pain is symptomatic dizziness do not see a need for the isosorbide    · Continue I/O, daily weight, 1500 mL fluid restriction, and low sodium diet  · Echocardiogram showing LVEF 40-45% inferior and lateral walls appear hypokinetic  Right ventricle has a peak pressure of at least 44 mmHg  Severe to moderate mitral valve regurgitation  And bilateral atrium are dilated  · Cardiology following ongoing recommendations appreciated  Atrial fibrillation (HCC)   Assessment & Plan    · Rate controlled  · Continue Eliquis  · Agree no acute evidence of bleeding  · Monitor patient closely  JUSTINE (acute kidney injury) [facility])   Assessment & Plan    · Discussion with Nephrology patient does not have an acute kidney injury patient likely has CKD stage 4 the fluctuating creatinine  · Creatinine currently stable at 2 52        CAD (coronary artery disease)   Assessment & Plan    · Continue Coreg         Chronic kidney disease (CKD), stage IV (severe) ([facility])   Assessment & Plan    · Continue with diuretics and monitor closely  · Nephrology consult pending  History of cerebellar stroke   Assessment & Plan    · Known history  · Continue Eliquis        History of ileal conduit   Assessment & Plan    · Patient has a history of ileal conduit      · Family verbalized concern for UTI   · Patient currently afebrile without leukocytosis and has a normal procalcitonin would not put patient on antibiotics at this time  VTE Pharmacologic Prophylaxis:   Pharmacologic: Apixaban (Eliquis)  Mechanical VTE Prophylaxis in Place: Yes    Patient Centered Rounds: I have performed bedside rounds with nursing staff today  Discussions with Specialists or Other Care Team Provider:  Cardiology and Nephrology    Education and Discussions with Family / Patient:  Patient    Time Spent for Care: 35 minutes  More than 50% of total time spent on counseling and coordination of care as described above  Current Length of Stay: 1 day(s)    Current Patient Status: Inpatient   Certification Statement: The patient will continue to require additional inpatient hospital stay due to Ongoing treatment for acute on chronic CHF    Discharge Plan:  Anticipate discharge home likely tomorrow if continues to improve clinically  Code Status: Level 3 - DNAR and DNI      Subjective:   Patient alert pleasant sitting out of bed patient has question regarding his isosorbide stating patient was originally on 20 mg b i d  and then a could not get any more through the [place] so they switched him over to 60 mg daily patient became symptomatic so decrease the dose to 30 mg daily  Cardiology at the bedside question patient if he is having ongoing chest pain patient states known he has been having dizziness more cardiology still no clinical indication to continue this medication recommended seeing it at this time  Objective:     Vitals:   Temp (24hrs), Av 7 °F (36 5 °C), Min:97 4 °F (36 3 °C), Max:98 3 °F (36 8 °C)    HR:  [73-91] 89  Resp:  [16-20] 18  BP: (117-147)/(64-86) 141/80  SpO2:  [95 %-100 %] 95 %  Body mass index is 23 18 kg/m²  Input and Output Summary (last 24 hours):        Intake/Output Summary (Last 24 hours) at 18 1049  Last data filed at 18 0555   Gross per 24 hour   Intake              150 ml   Output             3050 ml   Net            -2900 ml       Physical Exam:     Physical Exam Constitutional: He is oriented to person, place, and time  HENT:   Head: Normocephalic and atraumatic  Hard of hearing   Eyes: Conjunctivae and EOM are normal    Neck: Normal range of motion  JVD present  Cardiovascular: Normal rate and regular rhythm  Murmur heard  Pulmonary/Chest: Effort normal  He has decreased breath sounds in the right lower field and the left lower field  He has rhonchi in the right lower field and the left lower field  Mild bibasilar crackles   Abdominal: Soft  Bowel sounds are normal    Musculoskeletal: Normal range of motion  He exhibits no edema  Neurological: He is alert and oriented to person, place, and time  Skin: Skin is warm and dry  Psychiatric: He has a normal mood and affect  His behavior is normal          Additional Data:     Labs:      Results from last 7 days  Lab Units 08/24/18  1120   WBC Thousand/uL 4 75   HEMOGLOBIN g/dL 11 1*   HEMATOCRIT % 36 0*   PLATELETS Thousands/uL 145*   NEUTROS PCT % 70   LYMPHS PCT % 18   MONOS PCT % 9   EOS PCT % 2       Results from last 7 days  Lab Units 08/25/18  0438 08/24/18  1120   SODIUM mmol/L 139 136   POTASSIUM mmol/L 3 6 4 2   CHLORIDE mmol/L 106 103   CO2 mmol/L 25 24   BUN mg/dL 35* 34*   CREATININE mg/dL 2 52* 2 55*   CALCIUM mg/dL 8 7 8 8   TOTAL PROTEIN g/dL  --  8 1   BILIRUBIN TOTAL mg/dL  --  0 90   ALK PHOS U/L  --  92   ALT U/L  --  17   AST U/L  --  17   GLUCOSE RANDOM mg/dL 84 116       Results from last 7 days  Lab Units 08/24/18  1120   INR  1 68*                 * I Have Reviewed All Lab Data Listed Above  * Additional Pertinent Lab Tests Reviewed:  All [facility] Admission Reviewed        Recent Cultures (last 7 days):           Last 24 Hours Medication List:     Current Facility-Administered Medications:  acetaminophen 650 mg Oral Q6H PRN Toño Chen MD   albuterol 2 puff Inhalation Q6H PRN Toño Chen MD   amLODIPine 2 5 mg Oral Daily Toño Chen MD   apixaban 2 5 mg Oral BID Statement Selected Kevan Waterman MD   carvedilol 6 25 mg Oral BID With Meals Kevan Waterman MD   citalopram 10 mg Oral Daily Kevan Waterman MD   furosemide 40 mg Oral BID (diuretic) JESSENIA Ayers   ondansetron 4 mg Intravenous Q6H PRN Kevan Waterman MD        Today, Patient Was Seen By: JESSENIA Ayers    ** Please Note: Dictation voice to text software may have been used in the creation of this document   **

## 2024-02-10 NOTE — TELEPHONE ENCOUNTER
Reviewed with Dr Chi Colbert  Would recommend monitoring for urine culture results for sensitivity  Will hold off on further antibiotics at this time  Patient should remain well hydrated and continue to monitor in the meantime  Thank you  The patient is Stable - Low risk of patient condition declining or worsening    Shift Goals  Clinical Goals: Pain management, monitor AGNIESZKA output  Patient Goals: Rest    Progress made toward(s) clinical / shift goals:  Pt educated on POC including pain management, use of PCA pump, and AGNIESZKA drainage, pt verbalizes understanding. Call light within reach.     Problem: Knowledge Deficit - Standard  Goal: Patient and family/care givers will demonstrate understanding of plan of care, disease process/condition, diagnostic tests and medications  Outcome: Progressing       Patient is not progressing towards the following goals: